# Patient Record
Sex: FEMALE | Race: WHITE | NOT HISPANIC OR LATINO | Employment: OTHER | URBAN - METROPOLITAN AREA
[De-identification: names, ages, dates, MRNs, and addresses within clinical notes are randomized per-mention and may not be internally consistent; named-entity substitution may affect disease eponyms.]

---

## 2017-01-04 ENCOUNTER — APPOINTMENT (OUTPATIENT)
Dept: PAIN MEDICINE | Facility: HOSPITAL | Age: 52
End: 2017-01-04

## 2017-03-24 ENCOUNTER — TELEPHONE (OUTPATIENT)
Dept: NEUROSURGERY | Facility: CLINIC | Age: 52
End: 2017-03-24

## 2017-03-27 ENCOUNTER — APPOINTMENT (OUTPATIENT)
Dept: PAIN MEDICINE | Facility: HOSPITAL | Age: 52
End: 2017-03-27

## 2017-03-27 DIAGNOSIS — M54.16 LUMBAR RADICULOPATHY: Primary | ICD-10-CM

## 2017-09-21 DIAGNOSIS — Z12.11 COLON CANCER SCREENING: Primary | ICD-10-CM

## 2018-07-24 ENCOUNTER — APPOINTMENT (OUTPATIENT)
Dept: WOMENS IMAGING | Facility: HOSPITAL | Age: 53
End: 2018-07-24

## 2018-07-24 PROCEDURE — 77067 SCR MAMMO BI INCL CAD: CPT | Performed by: RADIOLOGY

## 2018-12-21 ENCOUNTER — TELEPHONE (OUTPATIENT)
Dept: FAMILY MEDICINE CLINIC | Facility: CLINIC | Age: 53
End: 2018-12-21

## 2018-12-21 NOTE — TELEPHONE ENCOUNTER
----- Message from Tana Barragan sent at 12/20/2018  2:08 PM EST -----  Patient of Lissa was due to see Tana today but due to traffic she was late for her appointment.  She reschedule to see Lissa in January and is asking if she can get enough Levothyroxine until she is seen.  [facility] [phone]

## 2019-01-23 ENCOUNTER — OFFICE VISIT (OUTPATIENT)
Dept: FAMILY MEDICINE CLINIC | Facility: CLINIC | Age: 54
End: 2019-01-23

## 2019-01-23 VITALS
DIASTOLIC BLOOD PRESSURE: 70 MMHG | WEIGHT: 138 LBS | HEIGHT: 66 IN | HEART RATE: 59 BPM | OXYGEN SATURATION: 98 % | BODY MASS INDEX: 22.18 KG/M2 | SYSTOLIC BLOOD PRESSURE: 112 MMHG

## 2019-01-23 DIAGNOSIS — Z00.00 PHYSICAL EXAM: Primary | ICD-10-CM

## 2019-01-23 DIAGNOSIS — Z23 NEED FOR VACCINATION: ICD-10-CM

## 2019-01-23 DIAGNOSIS — E03.9 ACQUIRED HYPOTHYROIDISM: ICD-10-CM

## 2019-01-23 PROCEDURE — 99396 PREV VISIT EST AGE 40-64: CPT | Performed by: NURSE PRACTITIONER

## 2019-01-23 PROCEDURE — 90674 CCIIV4 VAC NO PRSV 0.5 ML IM: CPT | Performed by: NURSE PRACTITIONER

## 2019-01-23 PROCEDURE — 90471 IMMUNIZATION ADMIN: CPT | Performed by: NURSE PRACTITIONER

## 2019-01-23 NOTE — PROGRESS NOTES
"Demi Leija is a 53 y.o. female.Demi presents for an annual exam. She has not been taking Amour Thyroid for a few months.     PMHX:hypothyroid  PSHX and PSHX reviewed in chart and with pt  Is getting her colonoscopy  Exercises daily  Good sleep hygiene    Assessment/Plan   Problem List Items Addressed This Visit     None      Visit Diagnoses     Physical exam    -  Primary    Relevant Orders    Comprehensive Metabolic Panel    Lipid Panel With / Chol / HDL Ratio    Need for vaccination        Relevant Orders    Flucelvax Quad (Vial) =>4 yrs (4780-8789) (Completed)    Acquired hypothyroidism        Relevant Orders    TSH             No Follow-up on file.  There are no Patient Instructions on file for this visit.    Chief Complaint   Patient presents with   • Annual Exam     Social History     Tobacco Use   • Smoking status: Never Smoker   • Smokeless tobacco: Never Used   Substance Use Topics   • Alcohol use: Yes     Alcohol/week: 2.4 oz     Types: 4 Glasses of wine per week   • Drug use: Yes     Types: Marijuana     Comment: 4-5 times a year       History of Present Illness     The following portions of the patient's history were reviewed and updated as appropriate:PMHroutine: Social history , Past Medical History, Surgical history , Allergies, Current Medications, Active Problem List, Family History and Health Maintenance    Review of Systems   Constitutional: Negative for activity change and appetite change.   HENT: Negative for congestion.    Musculoskeletal: Positive for back pain.   Neurological: Negative for dizziness and headaches.       Objective   Vitals:    01/23/19 1331   BP: 112/70   Pulse: 59   SpO2: 98%   Weight: 62.6 kg (138 lb)   Height: 166.4 cm (65.5\")     Body mass index is 22.62 kg/m².  Physical Exam   Constitutional: She appears well-developed and well-nourished. No distress.   HENT:   Head: Normocephalic and atraumatic.   Right Ear: External ear normal.   Left Ear: External ear normal. "   Eyes: EOM are normal.   Neck: Neck supple. No thyromegaly present.   Cardiovascular: Normal rate, regular rhythm and normal heart sounds.   Pulmonary/Chest: Effort normal and breath sounds normal.   Musculoskeletal: Normal range of motion.   Neurological: She is alert.   Skin: Skin is warm.   Nursing note and vitals reviewed.    Reviewed Data:  No visits with results within 1 Month(s) from this visit.   Latest known visit with results is:   No results found for any previous visit.

## 2019-01-24 LAB
ALBUMIN SERPL-MCNC: 5.1 G/DL (ref 3.5–5.5)
ALBUMIN/GLOB SERPL: 2.6 {RATIO} (ref 1.2–2.2)
ALP SERPL-CCNC: 56 IU/L (ref 39–117)
ALT SERPL-CCNC: 19 IU/L (ref 0–32)
AST SERPL-CCNC: 18 IU/L (ref 0–40)
BILIRUB SERPL-MCNC: 1.1 MG/DL (ref 0–1.2)
BUN SERPL-MCNC: 24 MG/DL (ref 6–24)
BUN/CREAT SERPL: 25 (ref 9–23)
CALCIUM SERPL-MCNC: 9.7 MG/DL (ref 8.7–10.2)
CHLORIDE SERPL-SCNC: 101 MMOL/L (ref 96–106)
CHOLEST SERPL-MCNC: 238 MG/DL (ref 100–199)
CHOLEST/HDLC SERPL: 3.1 RATIO (ref 0–4.4)
CO2 SERPL-SCNC: 23 MMOL/L (ref 20–29)
CREAT SERPL-MCNC: 0.96 MG/DL (ref 0.57–1)
GLOBULIN SER CALC-MCNC: 2 G/DL (ref 1.5–4.5)
GLUCOSE SERPL-MCNC: 89 MG/DL (ref 65–99)
HDLC SERPL-MCNC: 76 MG/DL
LDLC SERPL CALC-MCNC: 135 MG/DL (ref 0–99)
POTASSIUM SERPL-SCNC: 4.7 MMOL/L (ref 3.5–5.2)
PROT SERPL-MCNC: 7.1 G/DL (ref 6–8.5)
SODIUM SERPL-SCNC: 141 MMOL/L (ref 134–144)
TRIGL SERPL-MCNC: 137 MG/DL (ref 0–149)
TSH SERPL DL<=0.005 MIU/L-ACNC: 1.72 UIU/ML (ref 0.45–4.5)
VLDLC SERPL CALC-MCNC: 27 MG/DL (ref 5–40)

## 2020-05-14 ENCOUNTER — OFFICE VISIT (OUTPATIENT)
Dept: FAMILY MEDICINE CLINIC | Facility: CLINIC | Age: 55
End: 2020-05-14

## 2020-05-14 ENCOUNTER — HOSPITAL ENCOUNTER (OUTPATIENT)
Dept: GENERAL RADIOLOGY | Facility: HOSPITAL | Age: 55
Discharge: HOME | End: 2020-05-14
Admitting: NURSE PRACTITIONER

## 2020-05-14 VITALS
OXYGEN SATURATION: 99 % | RESPIRATION RATE: 12 BRPM | TEMPERATURE: 97.8 F | WEIGHT: 142.8 LBS | BODY MASS INDEX: 22.95 KG/M2 | DIASTOLIC BLOOD PRESSURE: 80 MMHG | HEIGHT: 66 IN | HEART RATE: 62 BPM | SYSTOLIC BLOOD PRESSURE: 120 MMHG

## 2020-05-14 DIAGNOSIS — E03.9 HYPOTHYROIDISM, UNSPECIFIED TYPE: ICD-10-CM

## 2020-05-14 DIAGNOSIS — Z09 FOLLOW UP: ICD-10-CM

## 2020-05-14 DIAGNOSIS — M54.50 LUMBAR PAIN: Primary | ICD-10-CM

## 2020-05-14 DIAGNOSIS — M79.644 THUMB PAIN, RIGHT: ICD-10-CM

## 2020-05-14 DIAGNOSIS — M53.86 SCIATICA ASSOCIATED WITH DISORDER OF LUMBAR SPINE: ICD-10-CM
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
## 2020-05-14 PROCEDURE — 99213 OFFICE O/P EST LOW 20 MIN: CPT | Performed by: NURSE PRACTITIONER

## 2020-05-14 PROCEDURE — 72100 X-RAY EXAM L-S SPINE 2/3 VWS: CPT

## 2020-05-14 RX ORDER — BACLOFEN 10 MG/1
10 TABLET ORAL 3 TIMES DAILY
Qty: 60 TABLET | Refills: 0 | Status: SHIPPED | OUTPATIENT
Start: 2020-05-14 | End: 2020-09-09

## 2020-05-14 RX ORDER — METHYLPREDNISOLONE 4 MG/1
TABLET ORAL
Qty: 1 EACH | Refills: 0 | Status: SHIPPED | OUTPATIENT
Start: 2020-05-14 | End: 2020-09-09

## 2020-05-14 NOTE — PATIENT INSTRUCTIONS
Acute Back Pain, Adult  Acute back pain is sudden and usually short-lived. It is often caused by an injury to the muscles and tissues in the back. The injury may result from:  · A muscle or ligament getting overstretched or torn (strained). Ligaments are tissues that connect bones to each other. Lifting something improperly can cause a back strain.  · Wear and tear (degeneration) of the spinal disks. Spinal disks are circular tissue that provides cushioning between the bones of the spine (vertebrae).  · Twisting motions, such as while playing sports or doing yard work.  · A hit to the back.  · Arthritis.  You may have a physical exam, lab tests, and imaging tests to find the cause of your pain. Acute back pain usually goes away with rest and home care.  Follow these instructions at home:  Managing pain, stiffness, and swelling  · Take over-the-counter and prescription medicines only as told by your health care provider.  · Your health care provider may recommend applying ice during the first 24-48 hours after your pain starts. To do this:  ? Put ice in a plastic bag.  ? Place a towel between your skin and the bag.  ? Leave the ice on for 20 minutes, 2-3 times a day.  · If directed, apply heat to the affected area as often as told by your health care provider. Use the heat source that your health care provider recommends, such as a moist heat pack or a heating pad.  ? Place a towel between your skin and the heat source.  ? Leave the heat on for 20-30 minutes.  ? Remove the heat if your skin turns bright red. This is especially important if you are unable to feel pain, heat, or cold. You have a greater risk of getting burned.  Activity    · Do not stay in bed. Staying in bed for more than 1-2 days can delay your recovery.  · Sit up and stand up straight. Avoid leaning forward when you sit, or hunching over when you stand.  ? If you work at a desk, sit close to it so you do not need to lean over. Keep your chin tucked  "in. Keep your neck drawn back, and keep your elbows bent at a right angle. Your arms should look like the letter \"L.\"  ? Sit high and close to the steering wheel when you drive. Add lower back (lumbar) support to your car seat, if needed.  · Take short walks on even surfaces as soon as you are able. Try to increase the length of time you walk each day.  · Do not sit, drive, or  one place for more than 30 minutes at a time. Sitting or standing for long periods of time can put stress on your back.  · Do not drive or use heavy machinery while taking prescription pain medicine.  · Use proper lifting techniques. When you bend and lift, use positions that put less stress on your back:  ? Bend your knees.  ? Keep the load close to your body.  ? Avoid twisting.  · Exercise regularly as told by your health care provider. Exercising helps your back heal faster and helps prevent back injuries by keeping muscles strong and flexible.  · Work with a physical therapist to make a safe exercise program, as recommended by your health care provider. Do any exercises as told by your physical therapist.  Lifestyle  · Maintain a healthy weight. Extra weight puts stress on your back and makes it difficult to have good posture.  · Avoid activities or situations that make you feel anxious or stressed. Stress and anxiety increase muscle tension and can make back pain worse. Learn ways to manage anxiety and stress, such as through exercise.  General instructions  · Sleep on a firm mattress in a comfortable position. Try lying on your side with your knees slightly bent. If you lie on your back, put a pillow under your knees.  · Follow your treatment plan as told by your health care provider. This may include:  ? Cognitive or behavioral therapy.  ? Acupuncture or massage therapy.  ? Meditation or yoga.  Contact a health care provider if:  · You have pain that is not relieved with rest or medicine.  · You have increasing pain going down " into your legs or buttocks.  · Your pain does not improve after 2 weeks.  · You have pain at night.  · You lose weight without trying.  · You have a fever or chills.  Get help right away if:  · You develop new bowel or bladder control problems.  · You have unusual weakness or numbness in your arms or legs.  · You develop nausea or vomiting.  · You develop abdominal pain.  · You feel faint.  Summary  · Acute back pain is sudden and usually short-lived.  · Use proper lifting techniques. When you bend and lift, use positions that put less stress on your back.  · Take over-the-counter and prescription medicines and apply heat or ice as directed by your health care provider.  This information is not intended to replace advice given to you by your health care provider. Make sure you discuss any questions you have with your health care provider.  Document Released: 12/18/2006 Document Revised: 07/25/2019 Document Reviewed: 08/01/2018  [employer] Interactive Patient Education © 2020 [employer]
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
## 2020-05-15 LAB — TSH SERPL DL<=0.005 MIU/L-ACNC: 2.83 UIU/ML (ref 0.45–4.5)

## 2020-05-28 RX ORDER — LEVOTHYROXINE AND LIOTHYRONINE 38; 9 UG/1; UG/1
60 TABLET ORAL DAILY
Qty: 60 TABLET | Refills: 0 | Status: SHIPPED | OUTPATIENT
Start: 2020-05-28 | End: 2020-09-09

## 2020-06-09 ENCOUNTER — OFFICE VISIT (OUTPATIENT)
Dept: FAMILY MEDICINE CLINIC | Facility: CLINIC | Age: 55
End: 2020-06-09

## 2020-06-09 VITALS
RESPIRATION RATE: 16 BRPM | WEIGHT: 141 LBS | SYSTOLIC BLOOD PRESSURE: 110 MMHG | BODY MASS INDEX: 22.66 KG/M2 | HEART RATE: 62 BPM | OXYGEN SATURATION: 99 % | DIASTOLIC BLOOD PRESSURE: 82 MMHG | TEMPERATURE: 98.6 F | HEIGHT: 66 IN

## 2020-06-09 DIAGNOSIS — G89.29 HIP PAIN, CHRONIC, LEFT: ICD-10-CM

## 2020-06-09 DIAGNOSIS — F43.10 POSTTRAUMATIC STRESS DISORDER: ICD-10-CM

## 2020-06-09 DIAGNOSIS — M25.552 HIP PAIN, CHRONIC, LEFT: ICD-10-CM

## 2020-06-09 DIAGNOSIS — F41.9 ANXIETY: Primary | ICD-10-CM

## 2020-06-09 PROCEDURE — 99213 OFFICE O/P EST LOW 20 MIN: CPT | Performed by: NURSE PRACTITIONER

## 2020-06-09 RX ORDER — CELECOXIB 100 MG/1
100 CAPSULE ORAL 2 TIMES DAILY PRN
COMMUNITY
End: 2020-09-09

## 2020-06-09 RX ORDER — BUSPIRONE HYDROCHLORIDE 5 MG/1
5 TABLET ORAL 3 TIMES DAILY
Qty: 90 TABLET | Refills: 1 | Status: SHIPPED | OUTPATIENT
Start: 2020-06-09 | End: 2020-09-09
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
## 2020-06-09 NOTE — PATIENT INSTRUCTIONS
Generalized Anxiety Disorder, Adult  Generalized anxiety disorder (JONATHAN) is a mental health disorder. People with this condition constantly worry about everyday events. Unlike normal anxiety, worry related to JONATHAN is not triggered by a specific event. These worries also do not fade or get better with time. JONATHAN interferes with life functions, including relationships, work, and school.  JONATHAN can vary from mild to severe. People with severe JONATHAN can have intense waves of anxiety with physical symptoms (panic attacks).  What are the causes?  The exact cause of JONATHAN is not known.  What increases the risk?  This condition is more likely to develop in:  · Women.  · People who have a family history of anxiety disorders.  · People who are very shy.  · People who experience very stressful life events, such as the death of a loved one.  · People who have a very stressful family environment.  What are the signs or symptoms?  People with JONATHAN often worry excessively about many things in their lives, such as their health and family. They may also be overly concerned about:  · Doing well at work.  · Being on time.  · Natural disasters.  · Friendships.  Physical symptoms of JONATHAN include:  · Fatigue.  · Muscle tension or having muscle twitches.  · Trembling or feeling shaky.  · Being easily startled.  · Feeling like your heart is pounding or racing.  · Feeling out of breath or like you cannot take a deep breath.  · Having trouble falling asleep or staying asleep.  · Sweating.  · Nausea, diarrhea, or irritable bowel syndrome (IBS).  · Headaches.  · Trouble concentrating or remembering facts.  · Restlessness.  · Irritability.  How is this diagnosed?  Your health care provider can diagnose JONATHAN based on your symptoms and medical history. You will also have a physical exam. The health care provider will ask specific questions about your symptoms, including how severe they are, when they started, and if they come and go. Your health care  provider may ask you about your use of alcohol or drugs, including prescription medicines. Your health care provider may refer you to a mental health specialist for further evaluation.  Your health care provider will do a thorough examination and may perform additional tests to rule out other possible causes of your symptoms.  To be diagnosed with JONATHAN, a person must have anxiety that:  · Is out of his or her control.  · Affects several different aspects of his or her life, such as work and relationships.  · Causes distress that makes him or her unable to take part in normal activities.  · Includes at least three physical symptoms of JONATHAN, such as restlessness, fatigue, trouble concentrating, irritability, muscle tension, or sleep problems.  Before your health care provider can confirm a diagnosis of JONATHAN, these symptoms must be present more days than they are not, and they must last for six months or longer.  How is this treated?  The following therapies are usually used to treat JONATHAN:  · Medicine. Antidepressant medicine is usually prescribed for long-term daily control. Antianxiety medicines may be added in severe cases, especially when panic attacks occur.  · Talk therapy (psychotherapy). Certain types of talk therapy can be helpful in treating JONATHAN by providing support, education, and guidance. Options include:  ? Cognitive behavioral therapy (CBT). People learn coping skills and techniques to ease their anxiety. They learn to identify unrealistic or negative thoughts and behaviors and to replace them with positive ones.  ? Acceptance and commitment therapy (ACT). This treatment teaches people how to be mindful as a way to cope with unwanted thoughts and feelings.  ? Biofeedback. This process trains you to manage your body's response (physiological response) through breathing techniques and relaxation methods. You will work with a therapist while machines are used to monitor your physical symptoms.  · Stress  management techniques. These include yoga, meditation, and exercise.  A mental health specialist can help determine which treatment is best for you. Some people see improvement with one type of therapy. However, other people require a combination of therapies.  Follow these instructions at home:  · Take over-the-counter and prescription medicines only as told by your health care provider.  · Try to maintain a normal routine.  · Try to anticipate stressful situations and allow extra time to manage them.  · Practice any stress management or self-calming techniques as taught by your health care provider.  · Do not punish yourself for setbacks or for not making progress.  · Try to recognize your accomplishments, even if they are small.  · Keep all follow-up visits as told by your health care provider. This is important.  Contact a health care provider if:  · Your symptoms do not get better.  · Your symptoms get worse.  · You have signs of depression, such as:  ? A persistently sad, cranky, or irritable mood.  ? Loss of enjoyment in activities that used to bring you donna.  ? Change in weight or eating.  ? Changes in sleeping habits.  ? Avoiding friends or family members.  ? Loss of energy for normal tasks.  ? Feelings of guilt or worthlessness.  Get help right away if:  · You have serious thoughts about hurting yourself or others.  If you ever feel like you may hurt yourself or others, or have thoughts about taking your own life, get help right away. You can go to your nearest emergency department or call:  · Your local emergency services (911 in the [place]  · A suicide crisis helpline, such as the National Suicide Prevention Lifeline at 1-176.370.4202. This is open 24 hours a day.  Summary  · Generalized anxiety disorder (JONATHAN) is a mental health disorder that involves worry that is not triggered by a specific event.  · People with JONATHAN often worry excessively about many things in their lives, such as their health and  family.  · JONATHAN may cause physical symptoms such as restlessness, trouble concentrating, sleep problems, frequent sweating, nausea, diarrhea, headaches, and trembling or muscle twitching.  · A mental health specialist can help determine which treatment is best for you. Some people see improvement with one type of therapy. However, other people require a combination of therapies.  This information is not intended to replace advice given to you by your health care provider. Make sure you discuss any questions you have with your health care provider.  Document Released: 04/14/2014 Document Revised: 11/30/2018 Document Reviewed: 11/07/2017  [employer] Patient Education © 2020 [employer]

## 2020-06-17 DIAGNOSIS — F43.10 PTSD (POST-TRAUMATIC STRESS DISORDER): Primary | ICD-10-CM

## 2020-06-19 ENCOUNTER — CONSULT (OUTPATIENT)
Dept: ORTHOPEDIC SURGERY | Facility: CLINIC | Age: 55
End: 2020-06-19

## 2020-06-19 VITALS — HEIGHT: 65 IN | WEIGHT: 141 LBS | BODY MASS INDEX: 23.49 KG/M2 | TEMPERATURE: 98 F

## 2020-06-19 DIAGNOSIS — M70.62 TROCHANTERIC BURSITIS OF LEFT HIP: ICD-10-CM

## 2020-06-19 DIAGNOSIS — M25.552 BILATERAL HIP PAIN: Primary | ICD-10-CM

## 2020-06-19 DIAGNOSIS — M70.61 TROCHANTERIC BURSITIS OF RIGHT HIP: ICD-10-CM

## 2020-06-19 DIAGNOSIS — M25.551 BILATERAL HIP PAIN: Primary | ICD-10-CM

## 2020-06-19 DIAGNOSIS — M51.36 DDD (DEGENERATIVE DISC DISEASE), LUMBAR: ICD-10-CM

## 2020-06-19 PROCEDURE — 73522 X-RAY EXAM HIPS BI 3-4 VIEWS: CPT | Performed by: ORTHOPAEDIC SURGERY

## 2020-06-19 PROCEDURE — 99243 OFF/OP CNSLTJ NEW/EST LOW 30: CPT | Performed by: ORTHOPAEDIC SURGERY
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
## 2020-06-22 RX ORDER — CYCLOBENZAPRINE HCL 10 MG
10 TABLET ORAL 3 TIMES DAILY PRN
Qty: 60 TABLET | Refills: 0 | Status: SHIPPED | OUTPATIENT
Start: 2020-06-22 | End: 2020-09-09

## 2020-08-10 DIAGNOSIS — Z01.89 ROUTINE LAB DRAW: Primary | ICD-10-CM

## 2020-08-11 DIAGNOSIS — E03.9 HYPOTHYROIDISM, UNSPECIFIED TYPE: ICD-10-CM

## 2020-08-11 DIAGNOSIS — Z01.89 ROUTINE LAB DRAW: Primary | ICD-10-CM

## 2020-08-12 LAB
ALBUMIN SERPL-MCNC: 4.9 G/DL (ref 3.8–4.9)
ALBUMIN/GLOB SERPL: 2.1 {RATIO} (ref 1.2–2.2)
ALP SERPL-CCNC: 50 IU/L (ref 39–117)
ALT SERPL-CCNC: 18 IU/L (ref 0–32)
AST SERPL-CCNC: 17 IU/L (ref 0–40)
BILIRUB SERPL-MCNC: 1.2 MG/DL (ref 0–1.2)
BUN SERPL-MCNC: 20 MG/DL (ref 6–24)
BUN/CREAT SERPL: 19 (ref 9–23)
CALCIUM SERPL-MCNC: 10.1 MG/DL (ref 8.7–10.2)
CHLORIDE SERPL-SCNC: 101 MMOL/L (ref 96–106)
CHOLEST SERPL-MCNC: 248 MG/DL (ref 100–199)
CO2 SERPL-SCNC: 27 MMOL/L (ref 20–29)
CREAT SERPL-MCNC: 1.06 MG/DL (ref 0.57–1)
GLOBULIN SER CALC-MCNC: 2.3 G/DL (ref 1.5–4.5)
GLUCOSE SERPL-MCNC: 92 MG/DL (ref 65–99)
HDLC SERPL-MCNC: 55 MG/DL
LDLC SERPL CALC-MCNC: 148 MG/DL (ref 0–99)
LDLC/HDLC SERPL: 2.7 RATIO (ref 0–3.2)
POTASSIUM SERPL-SCNC: 4.8 MMOL/L (ref 3.5–5.2)
PROT SERPL-MCNC: 7.2 G/DL (ref 6–8.5)
SODIUM SERPL-SCNC: 141 MMOL/L (ref 134–144)
TRIGL SERPL-MCNC: 224 MG/DL (ref 0–149)
TSH SERPL DL<=0.005 MIU/L-ACNC: 1.93 UIU/ML (ref 0.45–4.5)
VLDLC SERPL CALC-MCNC: 45 MG/DL (ref 5–40)

## 2020-08-13 DIAGNOSIS — R53.83 FATIGUE, UNSPECIFIED TYPE: Primary | ICD-10-CM

## 2020-08-18 ENCOUNTER — RESULTS ENCOUNTER (OUTPATIENT)
Dept: FAMILY MEDICINE CLINIC | Facility: CLINIC | Age: 55
End: 2020-08-18

## 2020-08-18 DIAGNOSIS — R53.83 FATIGUE, UNSPECIFIED TYPE: ICD-10-CM

## 2020-08-31 ENCOUNTER — HOSPITAL ENCOUNTER (OUTPATIENT)
Dept: OTHER | Facility: HOSPITAL | Age: 55
Discharge: HOME | End: 2020-08-31
Attending: FAMILY MEDICINE

## 2020-09-01 ENCOUNTER — OFFICE VISIT (OUTPATIENT)
Dept: FAMILY MEDICINE CLINIC | Facility: CLINIC | Age: 55
End: 2020-09-01

## 2020-09-01 DIAGNOSIS — R19.7 DIARRHEA, UNSPECIFIED TYPE: ICD-10-CM

## 2020-09-01 DIAGNOSIS — G89.29 CHRONIC BILATERAL LOW BACK PAIN WITH RIGHT-SIDED SCIATICA: ICD-10-CM

## 2020-09-01 DIAGNOSIS — G47.00 INSOMNIA, UNSPECIFIED TYPE: ICD-10-CM

## 2020-09-01 DIAGNOSIS — M54.41 CHRONIC BILATERAL LOW BACK PAIN WITH RIGHT-SIDED SCIATICA: ICD-10-CM

## 2020-09-01 DIAGNOSIS — Z12.11 SCREENING FOR COLON CANCER: Primary | ICD-10-CM

## 2020-09-01 DIAGNOSIS — F41.9 ANXIETY: ICD-10-CM

## 2020-09-01 PROCEDURE — 99214 OFFICE O/P EST MOD 30 MIN: CPT | Performed by: NURSE PRACTITIONER

## 2020-09-01 NOTE — PROGRESS NOTES
Subjective wants to discuss blood pressure issues  Demi Leija is a 54 y.o. female.     History of Present Illness   Tele-visit  Has a history of back pain for several months. She has been seeing a chiropractor which seems to be helping a little.  She also started with diarrhea yesterday and decided to be tested for Covid and placed herself on the BRAT diet.  She has not had her colonoscopy yet and is adking for another order for a colonoscopy.  Is also having some insomnia. She has been taking some otc melatonin and drinking chamomile tea with little improvement.  She also has some anxiety and is thinking about starting on some medicines.  Lastly she would like discuss her last lab results.  The following portions of the patient's history were reviewed and updated as appropriate: allergies, current medications, past family history, past medical history, past social history, past surgical history and problem list.    Review of Systems   Constitutional: Positive for activity change and appetite change. Negative for chills, fatigue and fever.   HENT: Negative for congestion.    Eyes: Negative for pain.   Respiratory: Negative for cough and shortness of breath.    Cardiovascular: Negative for chest pain and leg swelling.   Gastrointestinal: Positive for diarrhea. Negative for constipation, nausea and vomiting.   Genitourinary: Negative for difficulty urinating.   Skin: Negative for rash.   Neurological: Negative for dizziness, facial asymmetry, headache and memory problem.   Psychiatric/Behavioral: Positive for stress. Negative for self-injury and suicidal ideas.       Objective   Physical Exam   Constitutional: She is oriented to person, place, and time.   Neurological: She is alert and oriented to person, place, and time.   Skin: Skin is warm.   Psychiatric: She has a normal mood and affect.         Assessment/Plan   Diagnoses and all orders for this visit:    Screening for colon cancer  -     Ambulatory Referral  to Gastroenterology    Anxiety    Diarrhea, unspecified type    Insomnia, unspecified type    Chronic bilateral low back pain with right-sided sciatica    I ordered a referral to gastroenterology for her screening colonoscopy.  She is going to continue to try her chamomile tea and herbal medicine for anxiety if it does not work she will call us back.  Advised her to start with some Imodium and continue her brat diet as far as her diarrhea was concerned.  She advised me she was going to ask her chiropractor for a name of a good neurosurgeon.  I had a lengthy discussion concerning her latest labs.  It seems as though her cholesterol panel is a bit out of the normal range.  We discussed decreasing her alcohol intake and starting an exercise regimen.  You have chosen to receive care through a telephone visit. Do you consent to use a telephone visit for your medical care today? Yes  I spent 25 minutes on the telephone with the patient discussing her current complaints, symptomology and our plan of care.

## 2020-09-04 LAB — SARS-COV-2 RNA SPEC QL NAA+PROBE: NOT DETECTED

## 2020-09-09 ENCOUNTER — OFFICE VISIT (OUTPATIENT)
Dept: FAMILY MEDICINE CLINIC | Facility: CLINIC | Age: 55
End: 2020-09-09

## 2020-09-09 VITALS
OXYGEN SATURATION: 98 % | WEIGHT: 138 LBS | RESPIRATION RATE: 14 BRPM | BODY MASS INDEX: 22.99 KG/M2 | HEIGHT: 65 IN | DIASTOLIC BLOOD PRESSURE: 88 MMHG | SYSTOLIC BLOOD PRESSURE: 138 MMHG | HEART RATE: 64 BPM

## 2020-09-09 DIAGNOSIS — R10.11 RIGHT UPPER QUADRANT ABDOMINAL PAIN: ICD-10-CM

## 2020-09-09 DIAGNOSIS — K80.50 GALL BLADDER PAIN: Primary | ICD-10-CM

## 2020-09-09 PROCEDURE — 99213 OFFICE O/P EST LOW 20 MIN: CPT | Performed by: NURSE PRACTITIONER

## 2020-09-09 RX ORDER — DICYCLOMINE HYDROCHLORIDE 10 MG/1
10 CAPSULE ORAL
Qty: 18 CAPSULE | Refills: 0 | Status: SHIPPED | OUTPATIENT
Start: 2020-09-09

## 2020-09-09 NOTE — PROGRESS NOTES
Subjective   Demi Leija is a 54 y.o. female.     History of Present Illness   Uncontrollable diarrhea with hematochezia. Immense pain and bloating. Has stopped eating extra for small amounts. Has pain to her upper right quadrant and thinks she has gallbladder issues.  The following portions of the patient's history were reviewed and updated as appropriate: allergies, current medications, past family history, past medical history, past social history, past surgical history and problem list.    Review of Systems   Constitutional: Negative for activity change, appetite change and fever.   Respiratory: Negative for cough and shortness of breath.    Cardiovascular: Negative for chest pain and leg swelling.   Gastrointestinal: Positive for diarrhea.   Skin: Negative for rash.       Objective   Physical Exam   Constitutional: She appears well-developed and well-nourished. She appears distressed.   HENT:   Head: Normocephalic and atraumatic.   Eyes: Pupils are equal, round, and reactive to light. Conjunctivae and EOM are normal.   Cardiovascular: Normal rate.   Pulmonary/Chest: Effort normal and breath sounds normal.   Abdominal: Soft. Bowel sounds are normal. She exhibits distension. There is tenderness. There is guarding.   Musculoskeletal: Normal range of motion.   Neurological: She is alert.   Skin: Skin is warm.   Nursing note and vitals reviewed.        Assessment/Plan   Demi was seen today for diarrhea.    Diagnoses and all orders for this visit:    Gall bladder pain  -     Gamma GT  -     Lipase  -     Amylase  -     Cancel: CT Abdomen Pelvis With & Without Contrast  -     US Gallbladder; Future    Right upper quadrant abdominal pain  -     US Gallbladder; Future    Other orders  -     dicyclomine (Bentyl) 10 MG capsule; Take 1 capsule by mouth 3 (Three) Times a Day Before Meals.  -     Vitamin B12      Will call her with her lab levels.  Ordered an ultrasound of her gallbladder today.  Discussed taking the  Bentyl to help with the motility and cramping.

## 2020-09-10 LAB
AMYLASE SERPL-CCNC: 42 U/L (ref 28–100)
GGT SERPL-CCNC: 15 U/L (ref 5–36)
LIPASE SERPL-CCNC: 29 U/L (ref 13–60)
VIT B12 SERPL-MCNC: 425 PG/ML (ref 211–946)

## 2020-09-10 NOTE — PATIENT INSTRUCTIONS
Diarrhea, Adult  Diarrhea is frequent loose and watery bowel movements. Diarrhea can make you feel weak and cause you to become dehydrated. Dehydration can make you tired and thirsty, cause you to have a dry mouth, and decrease how often you urinate.  Diarrhea typically lasts 2-3 days. However, it can last longer if it is a sign of something more serious. It is important to treat your diarrhea as told by your health care provider.  Follow these instructions at home:  Eating and drinking         Follow these recommendations as told by your health care provider:  · Take an oral rehydration solution (ORS). This is an over-the-counter medicine that helps return your body to its normal balance of nutrients and water. It is found at pharmacies and retail stores.  · Drink plenty of fluids, such as water, ice chips, diluted fruit juice, and low-calorie sports drinks. You can drink milk also, if desired.  · Avoid drinking fluids that contain a lot of sugar or caffeine, such as energy drinks, sports drinks, and soda.  · Eat bland, easy-to-digest foods in small amounts as you are able. These foods include bananas, applesauce, rice, lean meats, toast, and crackers.  · Avoid alcohol.  · Avoid spicy or fatty foods.    Medicines  · Take over-the-counter and prescription medicines only as told by your health care provider.  · If you were prescribed an antibiotic medicine, take it as told by your health care provider. Do not stop using the antibiotic even if you start to feel better.  General instructions    · Wash your hands often using soap and water. If soap and water are not available, use a hand . Others in the household should wash their hands as well. Hands should be washed:  ? After using the toilet or changing a diaper.  ? Before preparing, cooking, or serving food.  ? While caring for a sick person or while visiting someone in a hospital.  · Drink enough fluid to keep your urine pale yellow.  · Rest at home while  you recover.  · Watch your condition for any changes.  · Take a warm bath to relieve any burning or pain from frequent diarrhea episodes.  · Keep all follow-up visits as told by your health care provider. This is important.  Contact a health care provider if:  · You have a fever.  · Your diarrhea gets worse.  · You have new symptoms.  · You cannot keep fluids down.  · You feel light-headed or dizzy.  · You have a headache.  · You have muscle cramps.  Get help right away if:  · You have chest pain.  · You feel extremely weak or you faint.  · You have bloody or black stools or stools that look like tar.  · You have severe pain, cramping, or bloating in your abdomen.  · You have trouble breathing or you are breathing very quickly.  · Your heart is beating very quickly.  · Your skin feels cold and clammy.  · You feel confused.  · You have signs of dehydration, such as:  ? Dark urine, very little urine, or no urine.  ? Cracked lips.  ? Dry mouth.  ? Sunken eyes.  ? Sleepiness.  ? Weakness.  Summary  · Diarrhea is frequent loose and watery bowel movements. Diarrhea can make you feel weak and cause you to become dehydrated.  · Drink enough fluids to keep your urine pale yellow.  · Make sure that you wash your hands after using the toilet. If soap and water are not available, use hand .  · Contact a health care provider if your diarrhea gets worse or you have new symptoms.  · Get help right away if you have signs of dehydration.  This information is not intended to replace advice given to you by your health care provider. Make sure you discuss any questions you have with your health care provider.  Document Released: 12/08/2003 Document Revised: 05/05/2020 Document Reviewed: 05/24/2019  [employer] Patient Education © 2020 [employer]

## 2020-09-15 DIAGNOSIS — R19.7 DIARRHEA, UNSPECIFIED TYPE: Primary | ICD-10-CM

## 2020-09-15 DIAGNOSIS — K52.9 CHRONIC DIARRHEA: Primary | ICD-10-CM

## 2020-09-15 DIAGNOSIS — K80.50 GALL BLADDER PAIN: ICD-10-CM

## 2020-09-15 DIAGNOSIS — R10.11 RIGHT UPPER QUADRANT ABDOMINAL PAIN: ICD-10-CM

## 2020-09-16 ENCOUNTER — TELEPHONE (OUTPATIENT)
Dept: FAMILY MEDICINE CLINIC | Facility: CLINIC | Age: 55
End: 2020-09-16

## 2020-09-16 DIAGNOSIS — R19.7 DIARRHEA, UNSPECIFIED TYPE: Primary | ICD-10-CM

## 2020-09-16 NOTE — TELEPHONE ENCOUNTER
Florencia with [facility], for [facility]  is calling to request an alternate recommendation for imaging.    Please advise        [facility] call back [phone]    Reference #[id number]

## 2020-09-17 LAB — SPECIMEN STATUS: NORMAL

## 2020-09-18 LAB
O+P SPEC MICRO: NORMAL
O+P STL CONC: NORMAL
SPECIMEN STATUS: NORMAL

## 2020-09-19 LAB
BACTERIA SPEC CULT: NORMAL
BACTERIA SPEC CULT: NORMAL
CAMPYLOBACTER STL CULT: NORMAL
E COLI SXT STL QL IA: NEGATIVE
SALM + SHIG STL CULT: NORMAL

## 2020-10-16 ENCOUNTER — APPOINTMENT (OUTPATIENT)
Dept: WOMENS IMAGING | Facility: HOSPITAL | Age: 55
End: 2020-10-16

## 2020-10-16 ENCOUNTER — OFFICE VISIT CONVERTED (OUTPATIENT)
Dept: FAMILY MEDICINE CLINIC | Age: 55
End: 2020-10-16
Attending: NURSE PRACTITIONER

## 2020-10-16 PROCEDURE — 77067 SCR MAMMO BI INCL CAD: CPT | Performed by: RADIOLOGY

## 2020-10-16 PROCEDURE — 77063 BREAST TOMOSYNTHESIS BI: CPT | Performed by: RADIOLOGY

## 2020-10-23 ENCOUNTER — HOSPITAL ENCOUNTER (OUTPATIENT)
Dept: OTHER | Facility: HOSPITAL | Age: 55
Discharge: HOME | End: 2020-10-23
Attending: NURSE PRACTITIONER

## 2020-10-24 LAB
ALP SERPL-CCNC: 52 U/L (ref 53–141)
ASO AB SERPL-ACNC: 85 [IU]/ML (ref 0–200)
CALCIUM SERPL-MCNC: 9.3 MG/DL (ref 8.7–10.4)
CONV RHEUMATOID FACTOR IGM: <10 [IU]/ML (ref 0–14)
CRP SERPL-MCNC: 5.2 MG/L (ref 0–5)
DSDNA AB SER-ACNC: NEGATIVE [IU]/ML
ENA AB SER IA-ACNC: NEGATIVE {RATIO}
ERYTHROCYTE [SEDIMENTATION RATE] IN BLOOD: 2 MM/H (ref 0–30)
PHOSPHATE SERPL-MCNC: 3.4 MG/DL (ref 2.4–4.5)
URATE SERPL-MCNC: 6.1 MG/DL (ref 2.5–7.5)

## 2020-10-28 ENCOUNTER — OFFICE VISIT (OUTPATIENT)
Dept: GASTROENTEROLOGY | Facility: CLINIC | Age: 55
End: 2020-10-28

## 2020-10-28 VITALS — BODY MASS INDEX: 22.79 KG/M2 | WEIGHT: 141.8 LBS | TEMPERATURE: 97 F | HEIGHT: 66 IN

## 2020-10-28 DIAGNOSIS — Z83.71 FAMILY HISTORY OF POLYPS IN THE COLON: ICD-10-CM

## 2020-10-28 DIAGNOSIS — R10.13 DYSPEPSIA: ICD-10-CM

## 2020-10-28 DIAGNOSIS — R19.4 CHANGE IN BOWEL HABITS: Primary | ICD-10-CM

## 2020-10-28 DIAGNOSIS — R11.0 NAUSEA: ICD-10-CM

## 2020-10-28 DIAGNOSIS — Z80.0 FAMILY HISTORY OF COLON CANCER IN FATHER: ICD-10-CM

## 2020-10-28 DIAGNOSIS — R19.7 DIARRHEA, UNSPECIFIED TYPE: ICD-10-CM

## 2020-10-28 PROCEDURE — 99204 OFFICE O/P NEW MOD 45 MIN: CPT | Performed by: NURSE PRACTITIONER

## 2020-10-28 NOTE — PROGRESS NOTES
Chief Complaint   Patient presents with   • Diarrhea   • Nausea   • Abdominal Cramping     HPI    Demi Leija is a  54 y.o. female here to establish care as a new patient for complaints of diarrhea.  This patient will follow with Dr. William as well.  Patient had stool culture and O&P in September which was negative.  No C. difficile was performed.  Recent CMP was normal.  Patient also had a GGT, lipase, amylase, TSH which were all normal.    Patient reports having an episode of diarrhea about 6 months ago initially thought to be due to starting meloxicam.  She was on meloxicam for approximately 1 year and stopped when diarrhea began.  Diarrhea subsided but then promptly returned in August of this year.  Before onset of diarrhea symptoms her bowels move daily to every other day formed with complete evacuation.  She is currently having 6-12 liquid stools a day, excessive gas and bloating, rectal bleeding, and occasional incontinence.  No weight loss in fact she is gained weight.  She tried Bentyl which did help with her diarrhea symptoms.    Some intermittent nausea but no vomiting.  She struggles with dyspepsia which historically responds to OTC Tums but has been worse with onset of diarrhea.  Her appetite waxes and wanes based on the severity of diarrhea symptoms.  She frequently avoids eating to avoid having diarrhea.  No dysphagia.  She is never had an upper endoscopy.    Recent abdominal ultrasound with multiple small gallstones.  No gallbladder wall thickening or pericholecystic fluid.  Common bile duct mildly distended 8 mm but no visible gall duct stone.    Recent CT abdomen and pelvis with contrast reviewed dated 9/24/2020 unremarkable.    Family history of colon cancer, father age 70.  Family history of colon polyps, brother.  Family history of celiac disease, mother.  She does not smoke.  She drinks alcohol socially.  She still takes occasional ibuprofen for back pain.  She still has her  gallbladder.    Past Medical History:   Diagnosis Date   • Hypothyroidism    • Post-menopause        Past Surgical History:   Procedure Laterality Date   • BUNIONECTOMY Bilateral    • RHINOPLASTY     • STOMACH SURGERY     • TONSILLECTOMY     • WISDOM TOOTH EXTRACTION         Scheduled Meds:  Outpatient Encounter Medications as of 10/28/2020   Medication Sig Dispense Refill   • NON FORMULARY Compound testosterone/estogen cream     • progesterone (PROMETRIUM) 200 MG capsule Take 200 mg by mouth Daily.     • dicyclomine (Bentyl) 10 MG capsule Take 1 capsule by mouth 3 (Three) Times a Day Before Meals. 18 capsule 0     No facility-administered encounter medications on file as of 10/28/2020.        Continuous Infusions:No current facility-administered medications for this visit.       PRN Meds:.    No Known Allergies    Social History     Socioeconomic History   • Marital status:      Spouse name: Not on file   • Number of children: 2   • Years of education: HIGH SCHOOL   • Highest education level: Not on file   Occupational History   • Occupation: REALTOR   Tobacco Use   • Smoking status: Never Smoker   • Smokeless tobacco: Never Used   Substance and Sexual Activity   • Alcohol use: Yes     Alcohol/week: 4.0 standard drinks     Types: 4 Glasses of wine per week   • Drug use: Yes     Types: Marijuana     Comment: 4-5 times a year   • Sexual activity: Defer       Family History   Problem Relation Age of Onset   • Arthritis Mother    • Stroke Mother    • Arthritis Father    • Cancer Father         colon   • Stroke Father    • Colon cancer Father    • Hypertension Sister    • Cancer Brother         prostate   • Stroke Brother         x 2   • Hypertension Brother    • Colon polyps Brother        Review of Systems   Constitutional: Negative for activity change, appetite change, fatigue, fever and unexpected weight change.   HENT: Negative for trouble swallowing and voice change.    Eyes: Negative.    Respiratory:  Negative for apnea, cough, choking, chest tightness, shortness of breath and wheezing.    Cardiovascular: Negative for chest pain, palpitations and leg swelling.   Gastrointestinal: Positive for diarrhea and nausea. Negative for abdominal distention, abdominal pain, anal bleeding, blood in stool, constipation, rectal pain and vomiting.        + Abdominal cramps   Endocrine: Negative.    Genitourinary: Negative.    Musculoskeletal: Negative.    Skin: Negative.    Allergic/Immunologic: Negative.    Neurological: Negative.    Hematological: Negative.    Psychiatric/Behavioral: Negative.        Vitals:    10/28/20 1356   Temp: 97 °F (36.1 °C)       Physical Exam  Constitutional:       Appearance: She is well-developed.   HENT:      Head: Normocephalic.      Nose: Nose normal.   Eyes:      Pupils: Pupils are equal, round, and reactive to light.   Neck:      Musculoskeletal: Normal range of motion.   Cardiovascular:      Rate and Rhythm: Normal rate and regular rhythm.      Heart sounds: Normal heart sounds.   Pulmonary:      Effort: Pulmonary effort is normal. No respiratory distress.      Breath sounds: Normal breath sounds. No wheezing.   Abdominal:      General: Bowel sounds are normal. There is no distension.      Palpations: Abdomen is soft. There is no mass.      Tenderness: There is no abdominal tenderness. There is no guarding.      Hernia: No hernia is present.   Musculoskeletal: Normal range of motion.   Skin:     General: Skin is warm and dry.      Capillary Refill: Capillary refill takes less than 2 seconds.   Neurological:      Mental Status: She is alert and oriented to person, place, and time.   Psychiatric:         Behavior: Behavior normal.     Problem list    Change in bowel habits  Diarrhea  Rectal bleeding  Abdominal cramping  Nausea  Dyspepsia  Gallstones  Family history of colon cancer and polyps  Family history of celiac disease    Assessment/plan    Pleasant 54-year-old female seen today to  establish care as new patient for complaints and change in bowel habits, diarrhea, rectal bleeding, nausea, and dyspepsia ongoing since August waxing and waning in severity.  She also has a family history of celiac disease, colon cancer, and colon polyps.  Given her current symptoms and family history recommend bidirectional endoscopic evaluation with Dr. William.    Labs today with celiac panel, CRP, and sed rate.  Complete more broad panel stool testing to include GI PCR, C. difficile, and fecal calprotectin.  Continue as needed antispasmodics.  If endoscopic work-up found to be unremarkable consider HIDA scan.    Further recommendations and follow-up pending the aforementioned work-up.

## 2020-10-29 DIAGNOSIS — R19.7 DIARRHEA, UNSPECIFIED TYPE: ICD-10-CM

## 2020-10-29 LAB
CRP SERPL-MCNC: 1.34 MG/DL (ref 0–0.5)
ENDOMYSIUM IGA SER QL: NEGATIVE
ERYTHROCYTE [SEDIMENTATION RATE] IN BLOOD BY WESTERGREN METHOD: 8 MM/HR (ref 0–30)
GLIADIN PEPTIDE IGA SER-ACNC: 2 UNITS (ref 0–19)
GLIADIN PEPTIDE IGG SER-ACNC: 2 UNITS (ref 0–19)
IGA SERPL-MCNC: 40 MG/DL (ref 87–352)
TTG IGA SER-ACNC: <2 U/ML (ref 0–3)
TTG IGG SER-ACNC: 2 U/ML (ref 0–5)

## 2020-10-30 LAB — C DIFF TOX GENS STL QL NAA+PROBE: NEGATIVE

## 2020-11-01 LAB
ADV 40+41 DNA STL QL NAA+NON-PROBE: NOT DETECTED
ASTRO TYP 1-8 RNA STL QL NAA+NON-PROBE: NOT DETECTED
C CAYETANENSIS DNA STL QL NAA+NON-PROBE: NOT DETECTED
C COLI+JEJ+UPSA DNA STL QL NAA+NON-PROBE: NOT DETECTED
C DIF TOX TCDA+TCDB STL QL NAA+NON-PROBE: NOT DETECTED
CRYPTOSP DNA STL QL NAA+NON-PROBE: NOT DETECTED
E COLI O157 DNA STL QL NAA+NON-PROBE: NORMAL
E HISTOLYT DNA STL QL NAA+NON-PROBE: NOT DETECTED
EAEC PAA PLAS AGGR+AATA ST NAA+NON-PRB: NOT DETECTED
EC STX1+STX2 GENES STL QL NAA+NON-PROBE: NOT DETECTED
EPEC EAE GENE STL QL NAA+NON-PROBE: NOT DETECTED
ETEC LTA+ST1A+ST1B TOX ST NAA+NON-PROBE: NOT DETECTED
G LAMBLIA DNA STL QL NAA+NON-PROBE: NOT DETECTED
NOROVIRUS GI+II RNA STL QL NAA+NON-PROBE: NOT DETECTED
P SHIGELLOIDES DNA STL QL NAA+NON-PROBE: NOT DETECTED
RVA RNA STL QL NAA+NON-PROBE: NOT DETECTED
S ENT+BONG DNA STL QL NAA+NON-PROBE: NOT DETECTED
SAPO I+II+IV+V RNA STL QL NAA+NON-PROBE: NOT DETECTED
SHIGELLA SP+EIEC IPAH ST NAA+NON-PROBE: NOT DETECTED
V CHOL+PARA+VUL DNA STL QL NAA+NON-PROBE: NOT DETECTED
V CHOLERAE DNA STL QL NAA+NON-PROBE: NOT DETECTED
Y ENTEROCOL DNA STL QL NAA+NON-PROBE: NOT DETECTED

## 2020-11-02 LAB — CALPROTECTIN STL-MCNT: 333 UG/G (ref 0–120)

## 2020-11-03 ENCOUNTER — TELEPHONE (OUTPATIENT)
Dept: GASTROENTEROLOGY | Facility: CLINIC | Age: 55
End: 2020-11-03

## 2020-11-03 NOTE — TELEPHONE ENCOUNTER
Sounds like she could have microscopic colitis.  I would have her do some Pepto-Bismol and see if that does not help until her scope.  Have her call us back Friday with an update.  Thanks

## 2020-11-03 NOTE — TELEPHONE ENCOUNTER
"Call to pt.  Advise per BHAVYA Montilla note.  Verb understanding.      States has persistent diarrhea - yesterday 18x.  \"Can barely make it to the BR\".  Has used dicyclomine without relief.  Tried imodium for some measure of control.  Does note occasional blood in toilet water.      States c/s tentatively scheduled for 11/11 - needs to complete something on line to confirm.  Will do so today.      Asking how to manage symptoms until c/s.    Advise BHAVYA Montilla out of office - will send message to APRN.  Verb understanding.   "

## 2020-11-03 NOTE — TELEPHONE ENCOUNTER
----- Message from CAL Monzon sent at 11/2/2020  3:38 PM EST -----  Please inform Rosa said that her stool test shows elevated levels of inflammation within her colon.  No evidence of a colon infection.  Celiac panel normal.  Await endoscopic evaluation with Dr. William.

## 2020-11-06 ENCOUNTER — LAB REQUISITION (OUTPATIENT)
Dept: LAB | Facility: HOSPITAL | Age: 55
End: 2020-11-06

## 2020-11-06 DIAGNOSIS — Z00.00 ENCOUNTER FOR GENERAL ADULT MEDICAL EXAMINATION WITHOUT ABNORMAL FINDINGS: ICD-10-CM

## 2020-11-06 PROCEDURE — U0004 COV-19 TEST NON-CDC HGH THRU: HCPCS | Performed by: INTERNAL MEDICINE

## 2020-11-07 LAB — SARS-COV-2 RNA RESP QL NAA+PROBE: NOT DETECTED

## 2020-11-11 ENCOUNTER — OUTSIDE FACILITY SERVICE (OUTPATIENT)
Dept: GASTROENTEROLOGY | Facility: CLINIC | Age: 55
End: 2020-11-11

## 2020-11-11 PROCEDURE — 43239 EGD BIOPSY SINGLE/MULTIPLE: CPT | Performed by: INTERNAL MEDICINE

## 2020-11-11 PROCEDURE — 45380 COLONOSCOPY AND BIOPSY: CPT | Performed by: INTERNAL MEDICINE

## 2020-11-18 ENCOUNTER — OFFICE VISIT CONVERTED (OUTPATIENT)
Dept: FAMILY MEDICINE CLINIC | Age: 55
End: 2020-11-18
Attending: NURSE PRACTITIONER

## 2020-11-20 ENCOUNTER — TELEPHONE (OUTPATIENT)
Dept: GASTROENTEROLOGY | Facility: CLINIC | Age: 55
End: 2020-11-20

## 2020-11-20 ENCOUNTER — CONVERSION ENCOUNTER (OUTPATIENT)
Dept: OTHER | Facility: HOSPITAL | Age: 55
End: 2020-11-20

## 2020-11-20 RX ORDER — BUDESONIDE 3 MG/1
9 CAPSULE, COATED PELLETS ORAL DAILY
Qty: 90 CAPSULE | Refills: 3 | Status: SHIPPED | OUTPATIENT
Start: 2020-11-20 | End: 2021-02-18

## 2020-11-20 NOTE — TELEPHONE ENCOUNTER
Antonio Verde RegSched Rep  P [facility] [address]   Phone Number: [phone]             Pt is calling for her results.

## 2020-11-20 NOTE — TELEPHONE ENCOUNTER
Patient called, advised as per Dr. William's note. Patient referred to [email or web address] to research her diagnosis more in depth. Advised if she has any questions to call back.  F/u appointment with Edna, 11/13@1100. She verb understanding and is in agreement with the plan.   Repeat c/s in 5 yrs added to recall.

## 2020-11-20 NOTE — PROGRESS NOTES
Collagenous colitis  I will prescribe Entocort 3 mg, 3 tablets p.o. every morning, #90,2 refills  Office visit Edna 6 to 8 weeks to check progress on Entocort  Polyp benign  Colonoscopy recall 5 years

## 2020-11-20 NOTE — TELEPHONE ENCOUNTER
----- Message from Oneil William MD sent at 11/20/2020  8:52 AM EST -----  Collagenous colitis  I will prescribe Entocort 3 mg, 3 tablets p.o. every morning, #90,2 refills  Office visit Edna 6 to 8 weeks to check progress on Entocort  Polyp benign  Colonoscopy recall 5 years

## 2020-11-30 ENCOUNTER — OFFICE VISIT CONVERTED (OUTPATIENT)
Dept: FAMILY MEDICINE CLINIC | Age: 55
End: 2020-11-30

## 2020-11-30 ENCOUNTER — HOSPITAL ENCOUNTER (OUTPATIENT)
Dept: OTHER | Facility: HOSPITAL | Age: 55
Discharge: HOME | End: 2020-11-30
Attending: NURSE PRACTITIONER

## 2020-12-03 LAB — SARS-COV-2 RNA SPEC QL NAA+PROBE: DETECTED

## 2021-01-06 ENCOUNTER — TELEPHONE (OUTPATIENT)
Dept: GASTROENTEROLOGY | Facility: CLINIC | Age: 56
End: 2021-01-06

## 2021-01-06 NOTE — TELEPHONE ENCOUNTER
Per verbal order of Dr. William, have the patient start taking Entocort one tablet daily for two weeks, he will review her chart tomorrow and will have further instructions at that time.   Patient called. She verb understanding of the instructions.   She states her symptoms started in the middle of December, has taken steroids in the past with a similar reaction.   She states she is currently taking IBprofen for back pain. Questions if it is ok to take it while on Entocort. Advised she may take the medication but must eat a meal and then take the medicine. She verb understanding.

## 2021-01-06 NOTE — TELEPHONE ENCOUNTER
Patient called into the office. She states she has been on Entocort since November. She has developed a rash on her torso, legs and scalp and is has caused extreme itching. She states she did cut back the medication to 2 tablets every morning instead of 3. She states she has had very little change in the rash or the itching. Advised will send an update to Dr. William.

## 2021-01-08 NOTE — TELEPHONE ENCOUNTER
Oneil Valladares MD Haag, Shelley D, RN; Edna Montilla, APRN; [facility]; [facility]  Caller: Unspecified (2 days ago,  3:10 PM)         She is on Entocort for collagenous colitis and it should be tapered anyway at this point.  She has been on it since November.  She should do 1 tablet a day for 2 weeks, 1 tablet every other day for 2 weeks and stop.  She can use Pepto-Bismol for collagenous colitis as needed, or Imodium over-the-counter, good alternatives  She can discuss this all with Edna on January 13 next week at her already scheduled appointment     Called pt and left vm to call back.

## 2021-01-13 ENCOUNTER — OFFICE VISIT (OUTPATIENT)
Dept: GASTROENTEROLOGY | Facility: CLINIC | Age: 56
End: 2021-01-13

## 2021-01-13 VITALS — TEMPERATURE: 97 F | BODY MASS INDEX: 22.82 KG/M2 | WEIGHT: 142 LBS | HEIGHT: 66 IN

## 2021-01-13 DIAGNOSIS — K52.831 COLITIS, COLLAGENOUS: Primary | ICD-10-CM

## 2021-01-13 DIAGNOSIS — Z86.010 PERSONAL HISTORY OF COLONIC POLYPS: ICD-10-CM

## 2021-01-13 PROCEDURE — 99214 OFFICE O/P EST MOD 30 MIN: CPT | Performed by: NURSE PRACTITIONER

## 2021-01-13 RX ORDER — BISMUTH SUBSALICYLATE 262 MG/1
524 TABLET, CHEWABLE ORAL 3 TIMES DAILY
Qty: 180 TABLET | Refills: 2 | Status: SHIPPED | OUTPATIENT
Start: 2021-01-13 | End: 2021-01-13

## 2021-01-13 RX ORDER — BISMUTH SUBSALICYLATE 262 MG/1
524 TABLET, CHEWABLE ORAL 3 TIMES DAILY
Qty: 180 TABLET | Refills: 2 | Status: SHIPPED | OUTPATIENT
Start: 2021-01-13 | End: 2021-06-16

## 2021-01-13 NOTE — PROGRESS NOTES
Chief Complaint   Patient presents with   • Collagenous colitis     HPI    Demi Leija is a  55 y.o. female here for a follow up visit for diarrhea, endoscopic follow-up.  This patient follows Dr. William and myself.  I reviewed endoscopic evaluation dated 11/11/2020 as follows:    EGD with normal findings.  Colonoscopy with normal ileum, polyp, nonbleeding internal hemorrhoids.  Pathology consistent with collagenous colitis and patient was started on Entocort.  Recall colonoscopy 5 years.    Currently she is doing quite well.  She is having 2 formed bowel movements a day with resolution of diarrhea symptoms.  She had to initiate Entocort taper sooner than expected due to the development of a rash which is almost completely resolved with lower dosage.  She denies abdominal pain, rectal pain, rectal bleeding.  She still having quite a bit of gas mainly in the morning.    No nausea, vomiting, acid/heartburn, or dysphagia.    Past Medical History:   Diagnosis Date   • Hypothyroidism    • Post-menopause        Past Surgical History:   Procedure Laterality Date   • BUNIONECTOMY Bilateral    • RHINOPLASTY     • STOMACH SURGERY     • TONSILLECTOMY     • WISDOM TOOTH EXTRACTION         Scheduled Meds:  Outpatient Encounter Medications as of 1/13/2021   Medication Sig Dispense Refill   • dicyclomine (Bentyl) 10 MG capsule Take 1 capsule by mouth 3 (Three) Times a Day Before Meals. 18 capsule 0   • NON FORMULARY Compound testosterone/estogen cream     • progesterone (PROMETRIUM) 200 MG capsule Take 200 mg by mouth Daily.     • Budesonide (ENTOCORT EC) 3 MG 24 hr capsule Take 3 capsules by mouth Daily for 90 days. 90 capsule 3     No facility-administered encounter medications on file as of 1/13/2021.        Continuous Infusions:No current facility-administered medications for this visit.       PRN Meds:.    No Known Allergies    Social History     Socioeconomic History   • Marital status:      Spouse name: Not on  file   • Number of children: 2   • Years of education: HIGH SCHOOL   • Highest education level: Not on file   Occupational History   • Occupation: REALTOR   Tobacco Use   • Smoking status: Never Smoker   • Smokeless tobacco: Never Used   Substance and Sexual Activity   • Alcohol use: Yes     Alcohol/week: 4.0 standard drinks     Types: 4 Glasses of wine per week   • Drug use: Yes     Types: Marijuana     Comment: 4-5 times a year   • Sexual activity: Defer       Family History   Problem Relation Age of Onset   • Arthritis Mother    • Stroke Mother    • Arthritis Father    • Cancer Father         colon   • Stroke Father    • Colon cancer Father    • Hypertension Sister    • Cancer Brother         prostate   • Stroke Brother         x 2   • Hypertension Brother    • Colon polyps Brother        Review of Systems   Constitutional: Negative for activity change, appetite change, fatigue, fever and unexpected weight change.   HENT: Negative for trouble swallowing.    Respiratory: Negative for apnea, cough, choking, chest tightness, shortness of breath and wheezing.    Cardiovascular: Negative for chest pain, palpitations and leg swelling.   Gastrointestinal: Negative for abdominal distention, abdominal pain, anal bleeding, blood in stool, constipation, diarrhea, nausea, rectal pain and vomiting.        + gas       Vitals:    01/13/21 1145   Temp: 97 °F (36.1 °C)       Physical Exam  Constitutional:       Appearance: She is well-developed.   Cardiovascular:      Rate and Rhythm: Normal rate and regular rhythm.      Heart sounds: Normal heart sounds.   Pulmonary:      Effort: Pulmonary effort is normal. No respiratory distress.      Breath sounds: Normal breath sounds. No wheezing.   Abdominal:      General: Bowel sounds are normal. There is no distension.      Palpations: Abdomen is soft. There is no mass.      Tenderness: There is no abdominal tenderness. There is no guarding.      Hernia: No hernia is present.    Neurological:      Mental Status: She is alert and oriented to person, place, and time.   Psychiatric:         Behavior: Behavior normal.       Assessment    Collagenous colitis  Personal history of colon polyps    Plan    Pleasant 55-year-old female seen today in follow-up status post endoscopic evaluation prompted for change in bowel habits with diarrhea demonstrating evidence of collagenous colitis via biopsy on endoscopy exam.  Patient's currently reports resolution of diarrhea symptoms.  She had to taper Entocort sooner than expected due to the development of a rash which has almost completely resolved with lower dosage.  She is currently on budesonide every other day to continue for an additional month then stop.  We discussed treatment with Pepto-Bismol in the future if diarrhea symptoms return to avoid rash which may have been caused by Entocort.  I gave her a prescription to have on hand.  I reviewed all procedural findings and answered all of her questions in great detail.  We also discussed the need to avoid NSAIDs when possible.  I also gave her a pamphlet regarding IBgard to try for complaints of gas.  I like to see her back in 6 weeks to monitor for continued symptom improvement.  She is to call with any questions or concerns in the interim.    Colonoscopy for surveillance purposes 5 years.

## 2021-04-06 ENCOUNTER — TELEPHONE (OUTPATIENT)
Dept: GASTROENTEROLOGY | Facility: CLINIC | Age: 56
End: 2021-04-06

## 2021-04-06 NOTE — TELEPHONE ENCOUNTER
----- Message from Kamaljit Dave sent at 4/6/2021  1:01 PM EDT -----  Patient would like you to call her about her missed appointment on 3/3/2021, as well she also had emergency surgery on Sunday 4/4/2021. Please call patient at [phone].

## 2021-04-08 ENCOUNTER — TELEPHONE (OUTPATIENT)
Dept: GASTROENTEROLOGY | Facility: CLINIC | Age: 56
End: 2021-04-08

## 2021-04-08 NOTE — TELEPHONE ENCOUNTER
Called pt, pt had emergent cholecystectomy on 4/4/21. Pt had missed her prevous appt. Advised will update Edna DUARTE.    Made f/u appt with Edna DUARTE for 5/11/21 @10:15am.

## 2021-04-08 NOTE — TELEPHONE ENCOUNTER
----- Message from Kamaljit Dave sent at 4/6/2021  1:01 PM EDT -----  Patient would like you to call her about her missed appointment on 3/3/2021, as well she also had emergency surgery on Sunday 4/4/2021. Please call patient at [phone].

## 2021-04-09 ENCOUNTER — OFFICE VISIT CONVERTED (OUTPATIENT)
Dept: FAMILY MEDICINE CLINIC | Age: 56
End: 2021-04-09
Attending: NURSE PRACTITIONER

## 2021-04-09 ENCOUNTER — HOSPITAL ENCOUNTER (OUTPATIENT)
Dept: OTHER | Facility: HOSPITAL | Age: 56
Discharge: HOME | End: 2021-04-09
Attending: NURSE PRACTITIONER

## 2021-04-29 ENCOUNTER — TELEPHONE (OUTPATIENT)
Dept: FAMILY MEDICINE CLINIC | Facility: CLINIC | Age: 56
End: 2021-04-29

## 2021-04-29 NOTE — TELEPHONE ENCOUNTER
Caller: [employer]    Relationship: Radiology    Best call back number: [phone]    What does billing need from the patient: DEMETRIO FROM [facility] [employer] CALLED STATING BACK IN September 2020 JEOVANNY ARCHIBALD ORDERED A CAT SCAN OF ABDOMEN/PELVIS WITH AND WITHOUT. PATIENT'S INSURANCE STATED [place] [address] FOR THE TEST TO BE AUTHORIZED AND PAID.  PREVIOUS AUTHORIZATION # [id number].  DEMETRIO SPOKE WITH INSURANCE COMPANY AND THEY ADVISED FOR DOCTOR TO CALL THEM AND CHANGE THE CODE TO 34727.

## 2021-05-14 VITALS — HEIGHT: 66 IN | BODY MASS INDEX: 22.5 KG/M2 | WEIGHT: 140 LBS
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
## 2021-05-18 NOTE — PROGRESS NOTES
Demi Leija  1965     Office/Outpatient Visit    Visit Date: Mon, Nov 30, 2020 03:55 pm    Provider: Ailyn Vivas N.P. (Assistant: Alisha Donald, )    Location: [facility]        Electronically signed by Ailyn Vivas N.P. on  11/30/2020 04:18:23 PM                             Subjective:        CC: Ms. Leija is a 55 year old White female.  ear pain, not any better;         HPI: patient seen 11/18/2020 for right ear pain. No improvement after taking 5 days of antibiotics-Augmentin. Has had chronic issues with her ears and has previously been on allergy medication, but is not currently taking. Denies fever, nausea, vomiting. Exposure to her fiance with covid symptoms.    ROS:     CONSTITUTIONAL:  Negative for chills, fatigue and fever.      EYES:  Negative for blurred vision.      E/N/T:  Positive for ear pain ( right ).   Negative for nasal congestion, frequent rhinorrhea, hoarseness, sinus pressure or sore throat.      CARDIOVASCULAR:  Negative for chest pain and pedal edema.      RESPIRATORY:  Negative for recent cough and dyspnea.      GASTROINTESTINAL:  Negative for abdominal pain, constipation, diarrhea, nausea and vomiting.      GENITOURINARY:  Negative for dysuria and frequent urination.      MUSCULOSKELETAL:  Negative for myalgias.      NEUROLOGICAL:  Negative for headaches.      PSYCHIATRIC:  Negative for anxiety, depression, and sleep disturbances.          Past Medical History / Family History / Social History:         Last Reviewed on 11/30/2020 04:03 PM by Ailyn Vivas    Past Medical History:             PAST MEDICAL HISTORY             GYNECOLOGICAL HISTORY:    2016  Menopause  (after miscarriage)    Demi Sood  [facility] HEALTH MAINTENANCE             COLORECTAL CANCER SCREENING: Other         Surgical History:         rhinoplasty 1995;  deviated septum  from previous accident in childhood;    mini tummytuck 2012; double bunionectomy - 2010          Family History:         Positive for Hypertension.      Positive for Colon Cancer ( father --  70s ) and Prostate Cancer ( brother ).      Positive for acid reflux and IBS - brother and sister.      Positive for Cerebrovascular Accident.      Positive for Type 2 Diabetes ( father ).          Social History:     Occupation:      Marital Status: engaged     Children: 2 children         Tobacco/Alcohol/Supplements:     Last Reviewed on 2020 04:03 PM by Ailyn Vivas    Tobacco: She has never smoked.          Alcohol: Frequency: Once a week         Immunizations:     influenza, injectable, quadrivalent, preservative free (FLUZONE QUAD 1401-2495) 10/16/2020        Allergies:     Last Reviewed on 2020 04:03 PM by Ailyn Vivas    No Known Allergies.        Current Medications:     Last Reviewed on 2020 04:03 PM by Ailyn Vivas    proGESTerone  [100mg capsules once daily]    ESTRADIOL / TESTOSTERONE 2MG/20MG PER ML  [APPLY 2 CLICKS TOPICALLY TWICE DAILY]    Augmentin 875-125 mg oral tablet [take 1 tablet by oral route every 12 hours x 10 days]    Diflucan 150 mg oral tablet [take 1 tablet (150 mg) by oral route now  may repeat in 1 week]    etodolac 300 mg oral capsule [take 1 capsule (300 mg) by oral route 2 times per day with food]    budesonide 3 mg oral Capsule, Delayed and Extended Release [3 caps daily]        Objective:        Vitals:         Current: 2020 3:57:30 PM    Ht:  5 ft, 5.5 in;  Wt: 143 lbs;  BMI: 23.4T: 97.8 F (temporal);  BP: 142/87 mm Hg (right arm, sitting);  P: 57 bpm (right arm (BP Cuff), sitting)O2 Sat: 93 % (room air)        Exams:     PHYSICAL EXAM:     GENERAL:  well developed and nourished; appropriately groomed; in no apparent distress;     EYES: PERRL, EOMI     E/N/T: EARS: both TMs are have fluid behind them;  NOSE:  normal nasal mucosa, septum, turbinates, and sinuses; OROPHARYNX:  normal mucosa, dentition, gingiva, and posterior  pharynx;     RESPIRATORY: normal respiratory rate and pattern with no distress; normal breath sounds with no rales, rhonchi, wheezes or rubs;     CARDIOVASCULAR: normal rate; rhythm is regular;  no systolic murmur; no edema;     LYMPHATIC: no enlargement of cervical or facial nodes; no supraclavicular nodes;     BREAST/INTEGUMENT: no rashs or lesions noted;     MUSCULOSKELETAL:  gait normal;     NEUROLOGIC: mental status: alert and oriented x 3; GROSSLY INTACT     PSYCHIATRIC:  appropriate affect and demeanor; normal speech pattern; grossly normal memory;         Assessment:         H92.01   Otalgia, right ear       Z20.828   Contact with and (suspected) exposure to other viral communicable diseases           ORDERS:         Meds Prescribed:       [New Rx] ZyrTEC 10 mg oral tablet [take 1 tablet (10 mg) by oral route once daily], #30 (thirty) tablets, Refills: 0 (zero)       [New Rx] fluticasone propionate 50 mcg/actuation Intranasal Spray, Suspension [inhale 1 spray (50 mcg) in each nostril by intranasal route 2 times per day], #15.8 (fifteen point eight) applicators, Refills: 0 (zero)         Lab Orders:       [id number]  COVID 19 Testing  (Send-Out)                      Plan:         Otalgia, right ear          Prescriptions:       [New Rx] ZyrTEC 10 mg oral tablet [take 1 tablet (10 mg) by oral route once daily], #30 (thirty) tablets, Refills: 0 (zero)       [New Rx] fluticasone propionate 50 mcg/actuation Intranasal Spray, Suspension [inhale 1 spray (50 mcg) in each nostril by intranasal route 2 times per day], #15.8 (fifteen point eight) applicators, Refills: 0 (zero)         Contact with and (suspected) exposure to other viral communicable diseases    LABORATORY:  Labs ordered to be performed today include COVID 19 Testing.      RECOMMENDATIONS given include: Educated patient that we are awaiting covid-19 test results. During this time quarantine, self isolate and self monitor. Educated to call or report to the ER  if having worsening shortness of breath, cough, high fever, or new symptoms. Patient understood these instructions..            Orders:       [id number]  COVID 19 Testing  (Send-Out)                  Charge Capture:         Primary Diagnosis:     H92.01  Otalgia, right ear           Orders:      [id number]  Office/outpatient visit; established patient, level 3  (In-House)              Z20.828  Contact with and (suspected) exposure to other viral communicable diseases

## 2021-05-18 NOTE — PROGRESS NOTES
Demi LeijaRenee  1965     Office/Outpatient Visit    Visit Date: Fri, Apr 9, 2021 03:03 pm    Provider: Desiree Huffman N.P. (Assistant: Laura Agustin MA)    Location: [facility]        Electronically signed by Desiree Huffman N.P. on  04/16/2021 08:03:16 PM                             Subjective:        CC: Ms. Lejia is a 55 year old White female.  presents today due to cough         HPI:           Cough noted.  It has been present for the past several weeks.  Respiratory symptoms include cough.   She denies chest congestion, chest tightness or wheezing.  Other symptoms include allergy symptoms.  She denies body aches, chest congestion or fever.  Sputum is described as none.  She has already tried to relieve the symptoms with cough medication, decongestants, and throat lozenges/sprays.  Medical history is significant for recently had lap choley  this is when her cough started.      ROS:     CONSTITUTIONAL:  Negative for chills and fever.      CARDIOVASCULAR:  Negative for chest pain and pedal edema.      RESPIRATORY:  Positive for recent cough.   Negative for dyspnea, hemoptysis, pleuritic chest pain or frequent wheezing.      GASTROINTESTINAL:  Positive for abdominal pain ( tender from recent surgery ).   Negative for acid reflux symptoms, constipation, diarrhea, heartburn, nausea or vomiting.      ALLERGIC/IMMUNOLOGIC:  Positive for seasonal allergies.      PSYCHIATRIC:  Negative for anxiety, depression, sleep disturbance and suicidal thoughts.          Past Medical History / Family History / Social History:         Last Reviewed on 4/16/2021 08:01 PM by Desiree Huffman    Past Medical History:             PAST MEDICAL HISTORY             GYNECOLOGICAL HISTORY:    2016  Menopause  (after miscarriage)    Demi Sood  [facility]         PREVENTIVE HEALTH MAINTENANCE             COLORECTAL CANCER SCREENING: Other         Surgical History:         rhinoplasty 1995;  deviated  septum  from previous accident in childhood;    mini tummytuck ; double bunionectomy -          Family History:         Positive for Hypertension.      Positive for Colon Cancer ( father --  70s ) and Prostate Cancer ( brother ).      Positive for acid reflux and IBS - brother and sister.      Positive for Cerebrovascular Accident.      Positive for Type 2 Diabetes ( father ).          Social History:     Occupation:      Marital Status: engaged     Children: 2 children         Tobacco/Alcohol/Supplements:     Last Reviewed on 2021 03:08 PM by Laura Agustin    Tobacco: She has never smoked.          Alcohol: Frequency: Once a week         Current Problems:     Last Reviewed on 2021 08:00 PM by Desiree Huffman    Headache    Pain in joints of unspecified hand    Low back pain    Diarrhea, unspecified    Chronic serous otitis media, right ear    Cough    Acquired absence of other specified parts of digestive tract        Immunizations:     influenza, injectable, quadrivalent, preservative free (FLUZONE QUAD [id number]) 10/16/2020        Allergies:     Last Reviewed on 2021 03:08 PM by Laura Agustin    No Known Allergies.        Current Medications:     Last Reviewed on 2021 03:08 PM by Laura Agustin    proGESTerone  [100mg capsules once daily]    ESTRADIOL / TESTOSTERONE 2MG/20MG PER ML  [APPLY 2 CLICKS TOPICALLY TWICE DAILY]    ZyrTEC 10 mg oral tablet [take 1 tablet (10 mg) by oral route once daily]    amoxicillin-pot clavulanate 500-125 mg oral tablet [TAKE ONE TABLET BY MOUTH EVERY DAY]    Medrol (Quique) 4 mg oral Tablet, Dose Pack [take as directed]    albuterol sulfate 90 mcg/actuation Inhalation HFA Aerosol Inhaler [inhale 1 - 2 puffs (90 - 180 mcg) by inhalation route every 4 hours as needed]    Bromfed DM 2-30-10 mg/5 mL oral Syrup [take 10 milliliters by oral route every 4 hours PRN]        Objective:        Vitals:         Current: 2021 3:08:02 PM     Ht:  5 ft, 5.5 in;  Wt: 139.2 lbs;  BMI: 22.8T: 98.4 F (temporal);  BP: 117/77 mm Hg (left arm, sitting);  P: 77 bpm (left arm (BP Cuff), sitting)O2 Sat: 97 % (room air)        Exams:     PHYSICAL EXAM:     GENERAL: vital signs recorded - well developed, well nourished;  no apparent distress;     RESPIRATORY: normal appearance and symmetric expansion of chest wall; normal respiratory rate and pattern with no distress; normal breath sounds with no rales, rhonchi, wheezes or rubs;     CARDIOVASCULAR: normal rate; rhythm is regular;  no edema;     GASTROINTESTINAL: mild diffuse and related to recent laproscopic surgery tenderness;     BREAST/INTEGUMENT: trochar sites without s/s infection;     MUSCULOSKELETAL: normal gait; normal range of motion of all major muscle groups; no limb or joint pain with range of motion;     NEUROLOGIC: mental status: alert and oriented x 3;     PSYCHIATRIC: appropriate affect and demeanor; normal speech pattern; normal thought and perception;         Assessment:         R05   Cough       Z90.49   Acquired absence of other specified parts of digestive tract           ORDERS:         Radiology/Test Orders:       [id number]  Radiologic exam chest 2 views  (Send-Out)                      Plan:         CoughRecommend that she continue mucinex, humidifier , throat spray.  We will get a CXR given her recent surgery and risk of pneumonia secondary to post op status.  Suspect may be irritation from the anethesia and combination of allergies.  Pending CXR and follow up if no improvement or worsening in 3-5 days        RADIOLOGY:  I have ordered a chest x-ray (PA and lateral) to be done today.            Orders:       [id number]  Radiologic exam chest 2 views  (Send-Out)              Acquired absence of other specified parts of digestive tractconsider pneumonia vs irritation from anethesia vs allergic rhinitis            Charge Capture:         Primary Diagnosis:     R05  Cough           Orders:      [id number]   Office/outpatient visit; established patient, level 3  (In-House)              Z90.49  Acquired absence of other specified parts of digestive tract

## 2021-05-18 NOTE — PROGRESS NOTES
Demi Leija.  1965     Office/Outpatient Visit    Visit Date: Fri, Oct 16, 2020 02:48 pm    Provider: Desiree Huffman N.P. (Assistant: Laura Agustin MA)    Location: [facility]        Electronically signed by Desiree Huffman N.P. on  10/19/2020 06:59:53 AM                             Subjective:        CC: Moshe Littcarr  Tana Leija is a 54 year old White female.  This is her first visit to the clinic.  establish care;         HPI:           Right upper quadrant pain noted.  Reports that this started suddenly on August 30thThis is located primarily in the right upper quadrant.  There is some radiation to the lower chest.  It began several months ago.  The onset of pain occurred with no apparent trigger.  Associated symptoms include change in bowel habits, constipation, diarrhea, unsure if related to current situation , but has been off and on for years day hx of arthralgias, mucus in stools and alternating diarrhea constipation - but the diarrhea is new and reports as urgent/explosive  no blood.  Some cramping and increase in flatus..  She denies anorexia or fever.  has had CTs scans, has never had endoscopy She has recently started on bioidentical hormone treatment with her GYN, but nothing else.  No change in diet.  Was previously on thyroid medication but was taken off about 4 months agoShe reports all labs, stool studies in the past have been negative.  siblings have been diagnosed with IBS    ROS:     CONSTITUTIONAL:  Negative for chills, fatigue and fever.      CARDIOVASCULAR:  Negative for chest pain and pedal edema.      RESPIRATORY:  Negative for recent cough and dyspnea.      GASTROINTESTINAL:  Positive for abdominal pain ( epigastric; RUQ ), constipation and diarrhea.   Negative for heartburn, hematochezia, melena, nausea or vomiting.      MUSCULOSKELETAL:  Positive for joint stiffness (diffuse joint stiffness - has been going off and on for years   but seems worse over the past few months).      PSYCHIATRIC:  Positive for anxiety.   Negative for depression, feelings of stress or suicidal thoughts.          Past Medical History / Family History / Social History:         Past Medical History:             PAST MEDICAL HISTORY             GYNECOLOGICAL HISTORY:    2016  Menopause  (after miscarriage)    Demi Sood  - [facility]         PREVENTIVE HEALTH MAINTENANCE             COLORECTAL CANCER SCREENING: Other         Surgical History:         rhinoplasty ;  deviated septum  from previous accident in childhood;    mini tummytuck ; double bunionectomy -          Family History:         Positive for Hypertension.      Positive for Colon Cancer ( father --  70s ) and Prostate Cancer ( brother ).      Positive for acid reflux and IBS - brother and sister.      Positive for Cerebrovascular Accident.      Positive for Type 2 Diabetes ( father ).          Social History:     Occupation:      Marital Status: engaged     Children: 2 children         Tobacco/Alcohol/Supplements:     Last Reviewed on 10/16/2020 02:54 PM by Laura Agustin    Tobacco: She has never smoked.          Alcohol: Frequency: Once a week         Current Problems:     Headache    Encounter for screening for other viral diseases    Encounter for immunization    Pain in joints of unspecified hand    Low back pain    Right upper quadrant pain        Immunizations:     None        Allergies:     Last Reviewed on 10/16/2020 02:53 PM by Laura Agustin    No Known Allergies.        Current Medications:     Last Reviewed on 10/16/2020 02:53 PM by Laura Agustin    proGESTerone  [100mg capsules once daily]    ESTRADIOL / TESTOSTERONE 2MG/20MG PER ML [APPLY 2 CLICKS TOPICALLY TWICE DAILY]        Objective:        Vitals:         Current: 10/16/2020 2:55:45 PM    Ht:  5 ft, 5.5 in;  Wt: 144 lbs;  BMI: 23.6T: 97.5 F (temporal);  BP: 125/84 mm Hg (left arm,  sitting);  P: 60 bpm (left arm (BP Cuff), sitting)        Exams:     PHYSICAL EXAM:     GENERAL: vital signs recorded - well developed, well nourished;  no apparent distress;     NECK: range of motion is normal;     RESPIRATORY: normal appearance and symmetric expansion of chest wall; normal respiratory rate and pattern with no distress; normal breath sounds with no rales, rhonchi, wheezes or rubs;     CARDIOVASCULAR: normal rate; rhythm is regular;  no edema;     GASTROINTESTINAL: nontender; hyperactive bowel sounds; no organomegaly;     MUSCULOSKELETAL: normal gait; normal range of motion of all major muscle groups; no limb or joint pain with range of motion;     NEUROLOGIC: mental status: alert and oriented x 3;     PSYCHIATRIC: appropriate affect and demeanor; normal speech pattern; normal thought and perception;         Assessment:         Z23   Encounter for immunization       M25.549   Pain in joints of unspecified hand       R10.11   Right upper quadrant pain       R19.7   Diarrhea, unspecified           ORDERS:         Lab Orders:       [id number]  MARILEE - [facility] SCOT w/Reflex  (Send-Out)            15548  RAPII [facility] Arthritis Profile  (Send-Out)              Procedures Ordered:       [id number]  Immunization administration; one vaccine  (In-House)              Other Orders:       00815  Influenza virus vaccine, quadrivalent, split virus, preservative free 3 years of age & older  (In-House)                      Plan:         Encounter for immunization          Immunizations:       24818  Immunization administration; one vaccine  (In-House)            82080  Influenza virus vaccine, quadrivalent, split virus, preservative free 3 years of age & older  (In-House)                Dose (ml): 0.5  Site: left deltoid  Route: intramuscular  Administered by: Laura Agustin          : [employer]  Lot #: [id number]  Exp: 06/30/2021          NDC: [address]-06        Pain in joints of unspecified hand     LABORATORY:  Labs ordered to be performed today include SCOT with Reflex and Arthritis Profile.            Orders:       [id number]  MARILEE - [facility] SCOT w/Reflex  (Send-Out)            45821  RAPII - [facility] Arthritis Profile  (Send-Out)              Right upper quadrant painWill get a copy of records.  I would recommend that we get her in with GI for evalaution .  (would recommend GI  - not general surgery since other GI symptoms present)  evaluate labs to see if any underlying issues from the joint pain        Diarrhea, unspecifiedgiven her symptoms, sounds like IBS, but will get records and refer to GI            Patient Recommendations:        For  Pain in joints of unspecified hand:    I also recommend ^.              Charge Capture:         Primary Diagnosis:     Z23  Encounter for immunization           Orders:      22706  Office visit - new pt, level 3  (In-House)            77447  Immunization administration; one vaccine  (In-House)            13171  Influenza virus vaccine, quadrivalent, split virus, preservative free 3 years of age & older  (In-House)              M25.549  Pain in joints of unspecified hand     R10.11  Right upper quadrant pain     R19.7  Diarrhea, unspecified         ADDENDUMS:      ____________________________________    Addendum: 10/25/2020 12:01 JUSTICE - Desiree Huffman  [id number]        Add  [id number]

## 2021-06-16 ENCOUNTER — OFFICE VISIT (OUTPATIENT)
Dept: FAMILY MEDICINE CLINIC | Age: 56
End: 2021-06-16

## 2021-06-16 VITALS
WEIGHT: 140.2 LBS | HEIGHT: 66 IN | SYSTOLIC BLOOD PRESSURE: 132 MMHG | HEART RATE: 70 BPM | DIASTOLIC BLOOD PRESSURE: 87 MMHG | TEMPERATURE: 98.8 F | BODY MASS INDEX: 22.53 KG/M2

## 2021-06-16 DIAGNOSIS — G89.29 CHRONIC LOW BACK PAIN, UNSPECIFIED BACK PAIN LATERALITY, UNSPECIFIED WHETHER SCIATICA PRESENT: Chronic | ICD-10-CM

## 2021-06-16 DIAGNOSIS — M54.50 CHRONIC LOW BACK PAIN, UNSPECIFIED BACK PAIN LATERALITY, UNSPECIFIED WHETHER SCIATICA PRESENT: Chronic | ICD-10-CM

## 2021-06-16 DIAGNOSIS — M54.16 LUMBAR RADICULOPATHY: Chronic | ICD-10-CM
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
## 2021-06-16 PROCEDURE — 99214 OFFICE O/P EST MOD 30 MIN: CPT | Performed by: NURSE PRACTITIONER

## 2021-06-16 RX ORDER — ALBUTEROL SULFATE 90 UG/1
AEROSOL, METERED RESPIRATORY (INHALATION)
COMMUNITY
Start: 2021-04-09
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
## 2021-07-02 VITALS
TEMPERATURE: 97.8 F | OXYGEN SATURATION: 93 % | BODY MASS INDEX: 22.98 KG/M2 | SYSTOLIC BLOOD PRESSURE: 142 MMHG | DIASTOLIC BLOOD PRESSURE: 87 MMHG | HEART RATE: 57 BPM | WEIGHT: 143 LBS | HEIGHT: 66 IN

## 2021-07-02 VITALS
WEIGHT: 142.2 LBS | TEMPERATURE: 97.2 F | DIASTOLIC BLOOD PRESSURE: 85 MMHG | BODY MASS INDEX: 22.85 KG/M2 | HEIGHT: 66 IN | HEART RATE: 68 BPM | SYSTOLIC BLOOD PRESSURE: 127 MMHG

## 2021-07-02 VITALS
HEART RATE: 60 BPM | SYSTOLIC BLOOD PRESSURE: 125 MMHG | BODY MASS INDEX: 23.14 KG/M2 | TEMPERATURE: 97.5 F | DIASTOLIC BLOOD PRESSURE: 84 MMHG | WEIGHT: 144 LBS | HEIGHT: 66 IN

## 2021-07-02 VITALS
HEART RATE: 77 BPM | TEMPERATURE: 98.4 F | DIASTOLIC BLOOD PRESSURE: 77 MMHG | BODY MASS INDEX: 22.37 KG/M2 | WEIGHT: 139.2 LBS | OXYGEN SATURATION: 97 % | SYSTOLIC BLOOD PRESSURE: 117 MMHG | HEIGHT: 66 IN

## 2021-08-30 ENCOUNTER — TELEPHONE (OUTPATIENT)
Dept: GASTROENTEROLOGY | Facility: CLINIC | Age: 56
End: 2021-08-30

## 2021-08-30 DIAGNOSIS — R19.7 DIARRHEA OF PRESUMED INFECTIOUS ORIGIN: Primary | ICD-10-CM

## 2021-08-30 NOTE — TELEPHONE ENCOUNTER
Called pt, she states she is having several episodes on diarrhea and urgency everyday x8 days.  Has been taking prescribed pepto with out results.  Also states she has taken imodium with out results. Pt states she did start a supplement that had magnesium in it. I advised not to take it bc it can cause diarrhea.  Pt states she is having cramping but denies abd pain, n/v, and fever. Advised will send a msg to Dr William for recommendations for diarrhea.     Msg sent to Dr William.

## 2021-08-30 NOTE — TELEPHONE ENCOUNTER
Oneil Valladares MD Stowers, Sharon G, RN  Caller: Unspecified (Today,  9:17 AM)  CBC, CMP, C. difficile toxin, GI PCR, she should come do this test today       Called pt and advised of Dr. William's note.  Pt verbalized understanding. [facility] [place] [facility] Faxed to [employer] at .    Orders entered for labs.  Msg sent to Dr William to cosign.

## 2021-08-30 NOTE — TELEPHONE ENCOUNTER
----- Message from Fermin Alonzo sent at 8/30/2021  8:42 AM EDT -----  Regarding: Flare Up  Contact: [phone]  Pt called stating having stomach flare up for nearly 10 days without relief.  Please contact pt to advise what should do.  Thank You

## 2021-08-31 LAB
ALBUMIN SERPL-MCNC: 4.6 G/DL (ref 3.8–4.9)
ALBUMIN/GLOB SERPL: 2 {RATIO} (ref 1.2–2.2)
ALP SERPL-CCNC: 56 IU/L (ref 48–121)
ALT SERPL-CCNC: 14 IU/L (ref 0–32)
AST SERPL-CCNC: 16 IU/L (ref 0–40)
BASOPHILS # BLD AUTO: 0.1 X10E3/UL (ref 0–0.2)
BASOPHILS NFR BLD AUTO: 1 %
BILIRUB SERPL-MCNC: 0.9 MG/DL (ref 0–1.2)
BUN SERPL-MCNC: 17 MG/DL (ref 6–24)
BUN/CREAT SERPL: 18 (ref 9–23)
CALCIUM SERPL-MCNC: 9.4 MG/DL (ref 8.7–10.2)
CHLORIDE SERPL-SCNC: 104 MMOL/L (ref 96–106)
CO2 SERPL-SCNC: 24 MMOL/L (ref 20–29)
CREAT SERPL-MCNC: 0.93 MG/DL (ref 0.57–1)
EOSINOPHIL # BLD AUTO: 0.1 X10E3/UL (ref 0–0.4)
EOSINOPHIL NFR BLD AUTO: 1 %
ERYTHROCYTE [DISTWIDTH] IN BLOOD BY AUTOMATED COUNT: 11.9 % (ref 11.7–15.4)
GLOBULIN SER CALC-MCNC: 2.3 G/DL (ref 1.5–4.5)
GLUCOSE SERPL-MCNC: 86 MG/DL (ref 65–99)
HCT VFR BLD AUTO: 41.2 % (ref 34–46.6)
HGB BLD-MCNC: 14.1 G/DL (ref 11.1–15.9)
IMM GRANULOCYTES # BLD AUTO: 0 X10E3/UL (ref 0–0.1)
IMM GRANULOCYTES NFR BLD AUTO: 1 %
LYMPHOCYTES # BLD AUTO: 2.7 X10E3/UL (ref 0.7–3.1)
LYMPHOCYTES NFR BLD AUTO: 31 %
MCH RBC QN AUTO: 31.3 PG (ref 26.6–33)
MCHC RBC AUTO-ENTMCNC: 34.2 G/DL (ref 31.5–35.7)
MCV RBC AUTO: 92 FL (ref 79–97)
MONOCYTES # BLD AUTO: 0.8 X10E3/UL (ref 0.1–0.9)
MONOCYTES NFR BLD AUTO: 9 %
NEUTROPHILS # BLD AUTO: 4.9 X10E3/UL (ref 1.4–7)
NEUTROPHILS NFR BLD AUTO: 57 %
PLATELET # BLD AUTO: 305 X10E3/UL (ref 150–450)
POTASSIUM SERPL-SCNC: 4.4 MMOL/L (ref 3.5–5.2)
PROT SERPL-MCNC: 6.9 G/DL (ref 6–8.5)
RBC # BLD AUTO: 4.5 X10E6/UL (ref 3.77–5.28)
SODIUM SERPL-SCNC: 140 MMOL/L (ref 134–144)
WBC # BLD AUTO: 8.5 X10E3/UL (ref 3.4–10.8)

## 2021-09-01 ENCOUNTER — APPOINTMENT (OUTPATIENT)
Dept: WOMENS IMAGING | Facility: HOSPITAL | Age: 56
End: 2021-09-01

## 2021-09-01 ENCOUNTER — TELEPHONE (OUTPATIENT)
Dept: GASTROENTEROLOGY | Facility: CLINIC | Age: 56
End: 2021-09-01

## 2021-09-01 LAB — C DIFF TOX A+B STL QL IA: NEGATIVE

## 2021-09-01 PROCEDURE — 77061 BREAST TOMOSYNTHESIS UNI: CPT | Performed by: RADIOLOGY

## 2021-09-01 PROCEDURE — G0279 TOMOSYNTHESIS, MAMMO: HCPCS | Performed by: RADIOLOGY

## 2021-09-01 PROCEDURE — 77065 DX MAMMO INCL CAD UNI: CPT | Performed by: RADIOLOGY

## 2021-09-01 NOTE — TELEPHONE ENCOUNTER
----- Message from Oneil William MD sent at 8/31/2021  9:08 AM EDT -----  Blood test labs normal await stool studies

## 2021-09-07 ENCOUNTER — TELEPHONE (OUTPATIENT)
Dept: GASTROENTEROLOGY | Facility: CLINIC | Age: 56
End: 2021-09-07

## 2021-09-07 NOTE — TELEPHONE ENCOUNTER
Per Oneil William MD Stowers, Sharon G, RN; Julia Javier, RN  Caller: Unspecified (Today,  9:51 AM)  Stool studies negative, please call in Entocort 3 mg, 3 tablets p.o. every morning, #90, 3 refills  Office visit Edna 6 to 8 weeks     Rx called into [facility] in [facility].     Follow up appt made with Edna DUARTE 10/14/21 @8:45am.

## 2021-09-07 NOTE — TELEPHONE ENCOUNTER
----- Message from Oneil William MD sent at 9/3/2021  8:39 AM EDT -----  C. difficile negative  ----- Message -----  From: [facility] Reflab Results In  Sent: 9/1/2021   8:08 PM EDT  To: Oneil William MD

## 2021-10-14 ENCOUNTER — OFFICE VISIT (OUTPATIENT)
Dept: GASTROENTEROLOGY | Facility: CLINIC | Age: 56
End: 2021-10-14

## 2021-10-14 VITALS — WEIGHT: 142.2 LBS | HEIGHT: 65 IN | TEMPERATURE: 96.3 F | BODY MASS INDEX: 23.69 KG/M2

## 2021-10-14 DIAGNOSIS — R19.7 DIARRHEA OF PRESUMED INFECTIOUS ORIGIN: ICD-10-CM

## 2021-10-14 DIAGNOSIS — K52.831 COLITIS, COLLAGENOUS: Primary | ICD-10-CM

## 2021-10-14 PROCEDURE — 99214 OFFICE O/P EST MOD 30 MIN: CPT | Performed by: NURSE PRACTITIONER

## 2021-10-14 RX ORDER — MONTELUKAST SODIUM 4 MG/1
1 TABLET, CHEWABLE ORAL DAILY PRN
Qty: 90 TABLET | Refills: 3 | Status: SHIPPED | OUTPATIENT
Start: 2021-10-14

## 2021-10-14 RX ORDER — L.RHAMNOSUS/B.ANIMALIS(LACTIS) 3B CELL
1 CAPSULE ORAL DAILY
Qty: 30 CAPSULE | Refills: 5 | Status: SHIPPED | OUTPATIENT
Start: 2021-10-14

## 2021-10-14 RX ORDER — MONTELUKAST SODIUM 4 MG/1
1 TABLET, CHEWABLE ORAL DAILY PRN
Qty: 90 TABLET | Refills: 3 | Status: SHIPPED | OUTPATIENT
Start: 2021-10-14 | End: 2021-10-14

## 2021-10-14 NOTE — PROGRESS NOTES
Chief Complaint   Patient presents with   • Collagenous colitis       HPI    Demi Leija is a  55 y.o. female here for a follow up visit for collagenous colitis.    This patient follows with Dr. William and myself.    Diagnosed with CC following endoscopic exam in November of last year.  Treated with Entocort.  She also has a personal history of colon polyps--due for follow-up surveillance colonoscopy 2025.    More recently patient complained of diarrhea symptoms in August ended up starting Entocort after negative stool testing.    Today patient reports tapering off of Entocort.  Diarrhea has resolved.  She is concerned about the return of diarrhea moving forward however.  Currently passing formed stools but still complains of excessive gas and bloating.  She also gets postcholecystectomy diarrhea symptoms with fatty meals.  She is asking about medication she can have on hand to use as needed.  Pepto-Bismol historically has not helped.  Denies rectal bleeding, abdominal pain, or rectal pain on visit today.  She still taking NSAIDs unfortunately for joint pain.    Past Medical History:   Diagnosis Date   • Acquired absence of other specified parts of digestive tract    • Chronic serous otitis media    • Diarrhea, unspecified    • Headache    • Hypothyroidism    • Pain in joints of unspecified hand    • Post-menopause        Past Surgical History:   Procedure Laterality Date   • ABDOMINOPLASTY  2012    mini   • BUNIONECTOMY Bilateral 2010   • CHOLECYSTECTOMY  2021   • RHINOPLASTY  1995   • STOMACH SURGERY     • TONSILLECTOMY     • WISDOM TOOTH EXTRACTION         Scheduled Meds:     Continuous Infusions: No current facility-administered medications for this visit.      PRN Meds:     No Known Allergies    Social History     Socioeconomic History   • Marital status: Single   • Number of children: 2   • Years of education: HIGH SCHOOL   Tobacco Use   • Smoking status: Never Smoker   • Smokeless tobacco: Never Used    Substance and Sexual Activity   • Alcohol use: Yes     Alcohol/week: 4.0 standard drinks     Types: 4 Glasses of wine per week   • Drug use: Yes     Types: Marijuana     Comment: 4-5 times a year   • Sexual activity: Defer       Family History   Problem Relation Age of Onset   • Arthritis Mother    • Stroke Mother    • Arthritis Father    • Cancer Father         colon   • Stroke Father    • Colon cancer Father    • Diabetes Father    • Hypertension Sister    • Cancer Brother         prostate   • Stroke Brother         x 2   • Hypertension Brother    • Colon polyps Brother    • SARA disease Brother    • Irritable bowel syndrome Brother    • Hypertension Other        Review of Systems   Constitutional: Negative for activity change, appetite change, fatigue, fever and unexpected weight change.   HENT: Negative for trouble swallowing.    Respiratory: Negative for apnea, cough, choking, chest tightness, shortness of breath and wheezing.    Cardiovascular: Negative for chest pain, palpitations and leg swelling.   Gastrointestinal: Negative for abdominal distention, abdominal pain, anal bleeding, blood in stool, constipation, diarrhea, nausea, rectal pain and vomiting.       Vitals:    10/14/21 0857   Temp: 96.3 °F (35.7 °C)       Physical Exam  Constitutional:       Appearance: She is well-developed.   Abdominal:      General: Bowel sounds are normal. There is no distension.      Palpations: Abdomen is soft. There is no mass.      Tenderness: There is no abdominal tenderness. There is no guarding.      Hernia: No hernia is present.   Skin:     General: Skin is warm and dry.      Capillary Refill: Capillary refill takes less than 2 seconds.   Neurological:      Mental Status: She is alert and oriented to person, place, and time.   Psychiatric:         Behavior: Behavior normal.     Assessment    Diagnoses and all orders for this visit:    1. Colitis, collagenous (Primary)  -     Gastrointestinal Panel, PCR - Stool, Per  Rectum  -     Clostridium Difficile Toxin, PCR - Stool, Per Rectum    2. Diarrhea of presumed infectious origin    Other orders  -     Discontinue: colestipol (COLESTID) 1 g tablet; Take 1 tablet by mouth Daily As Needed (Diarrhea).  Dispense: 90 tablet; Refill: 3  -     Probiotic Product (NCR Tehchnosolutions) capsule; Take 1 tablet by mouth Daily.  Dispense: 30 capsule; Refill: 5  -     colestipol (COLESTID) 1 g tablet; Take 1 tablet by mouth Daily As Needed (Diarrhea).  Dispense: 90 tablet; Refill: 3    Plan    Very pleasant 55-year-old female seen today for collagenous colitis reporting resolution of diarrhea symptoms after short course of budesonide.  She is concerned about return of diarrhea symptoms moving forward.  She had significant delay in stool testing given the holiday weekend and some technical difficulties with [facility].  As a precautionary measure recommend she take home stool kits in case diarrhea returns within the next 6 months and we can retest her stool in a timely fashion.  Instructed the patient to use the outpatient lab at the hospital for prompt her results.  She is also inquiring about medication to have on hand with episodes of diarrhea that correlate with high fat meals.  She is getting ready to go to her son's wedding.  We discussed Colestid 1 tablet as needed and the benefits of [facility]' colon health probiotics.  Offered a follow-up but patient prefers to follow-up as needed.    I spent 30 minutes caring for Demi on this date of service. This time includes time spent by me in the following activities: preparing for the visit, reviewing tests, obtaining and/or reviewing a separately obtained history, performing a medically appropriate examination and/or evaluation , counseling and educating the patient/family/caregiver, ordering medications, tests, or procedures, documenting information in the medical record, independently interpreting results and communicating that information with the  patient/family/caregiver and care coordination.         CAL Monzon  [facility]  [address] [place] [address]  Office: [phone]

## 2021-12-03 ENCOUNTER — APPOINTMENT (OUTPATIENT)
Dept: WOMENS IMAGING | Facility: HOSPITAL | Age: 56
End: 2021-12-03

## 2021-12-03 PROCEDURE — 77063 BREAST TOMOSYNTHESIS BI: CPT | Performed by: RADIOLOGY

## 2021-12-03 PROCEDURE — 77067 SCR MAMMO BI INCL CAD: CPT | Performed by: RADIOLOGY

## 2022-01-14 ENCOUNTER — OFFICE VISIT (OUTPATIENT)
Dept: FAMILY MEDICINE CLINIC | Age: 57
End: 2022-01-14

## 2022-01-14 VITALS
DIASTOLIC BLOOD PRESSURE: 94 MMHG | BODY MASS INDEX: 23.66 KG/M2 | SYSTOLIC BLOOD PRESSURE: 132 MMHG | WEIGHT: 142 LBS | OXYGEN SATURATION: 100 % | TEMPERATURE: 98.4 F | HEART RATE: 72 BPM | HEIGHT: 65 IN

## 2022-01-14 DIAGNOSIS — Z20.822 EXPOSURE TO COVID-19 VIRUS: ICD-10-CM

## 2022-01-14 DIAGNOSIS — R05.9 COUGH: Primary | ICD-10-CM

## 2022-01-14 PROCEDURE — 99213 OFFICE O/P EST LOW 20 MIN: CPT | Performed by: NURSE PRACTITIONER

## 2022-01-14 PROCEDURE — U0004 COV-19 TEST NON-CDC HGH THRU: HCPCS | Performed by: NURSE PRACTITIONER

## 2022-01-14 RX ORDER — BENZONATATE 200 MG/1
200 CAPSULE ORAL 3 TIMES DAILY PRN
Qty: 30 CAPSULE | Refills: 0 | Status: SHIPPED | OUTPATIENT
Start: 2022-01-14 | End: 2022-12-08

## 2022-01-14 RX ORDER — BROMPHENIRAMINE MALEATE, PSEUDOEPHEDRINE HYDROCHLORIDE, AND DEXTROMETHORPHAN HYDROBROMIDE 2; 30; 10 MG/5ML; MG/5ML; MG/5ML
5 SYRUP ORAL 4 TIMES DAILY PRN
Qty: 240 ML | Refills: 0 | Status: SHIPPED | OUTPATIENT
Start: 2022-01-14 | End: 2022-12-08

## 2022-01-14 NOTE — PROGRESS NOTES
Chief Complaint  Demi Leija presents to [facility] for Headache (onset monday 1/10/22), Nasal Congestion, and Cough    Subjective          Patient here today for concerns of possible covid infection or URI     Known Exposure to positive case?   yes  Date of exposure?   1 week ago  Date of symptoms start?   1/9/2022  (Symptoms may appear 2-14 days after exposure )    Fever or chills?   no  Cough?   yes  Shortness of breath or difficulty breathing?  yes  Fatigue?   yes  Muscle or body aches?  yes   Headache?   yes  New loss of taste or smell? yes  Sore throat?  yes  Congestion or runny nose? yes  Nausea or vomiting?   no  Diarrhea?   no  Any  emergency warning signs for COVID-19.   Trouble breathing?  no  Persistent pain or pressure in the chest?   no  New confusion? no  Inability to wake or stay awake?no  Pale, gray, or blue-colored skin, lips, or nail beds, depending on skin tone?   no    Taking any medications at home to help with symptoms?yes  Dayquil, tylenol  Any prior vaccine to covid?   yes  Any significant health problems / existing lung / heart problems?  no          Review of Systems      No Known Allergies   Past Medical History:   Diagnosis Date   • Acquired absence of other specified parts of digestive tract    • Chronic serous otitis media    • Diarrhea, unspecified    • Headache    • Hypothyroidism    • Pain in joints of unspecified hand    • Post-menopause      Current Outpatient Medications   Medication Sig Dispense Refill   • albuterol sulfate  (90 Base) MCG/ACT inhaler INHALE 1-2 PUFFS BY MOUTH EVERY 4 HOURS AS NEEDED     • ammonium lactate (LAC-HYDRIN) 12 % lotion apply EVERY DAY     • colestipol (COLESTID) 1 g tablet Take 1 tablet by mouth Daily As Needed (Diarrhea). 90 tablet 3   • dicyclomine (Bentyl) 10 MG capsule Take 1 capsule by mouth 3 (Three) Times a Day Before Meals. 18 capsule 0   • ESTRADIOL TONY-TESTOSTER ENAN IM Apply 2 clicks topically  twice daily     • Probiotic Product (ScaleXtreme) capsule Take 1 tablet by mouth Daily. 30 capsule 5   • benzonatate (TESSALON) 200 MG capsule Take 1 capsule by mouth 3 (Three) Times a Day As Needed for Cough. 30 capsule 0   • brompheniramine-pseudoephedrine-DM 30-2-10 MG/5ML syrup Take 5 mL by mouth 4 (Four) Times a Day As Needed for Congestion, Cough or Allergies. 240 mL 0     No current facility-administered medications for this visit.     Past Surgical History:   Procedure Laterality Date   • ABDOMINOPLASTY  2012    mini   • BUNIONECTOMY Bilateral 2010   • CHOLECYSTECTOMY  2021   • RHINOPLASTY  1995   • STOMACH SURGERY     • TONSILLECTOMY     • WISDOM TOOTH EXTRACTION        Social History     Tobacco Use   • Smoking status: Never Smoker   • Smokeless tobacco: Never Used   Substance Use Topics   • Alcohol use: Yes     Alcohol/week: 4.0 standard drinks     Types: 4 Glasses of wine per week   • Drug use: Yes     Types: Marijuana     Comment: 4-5 times a year     Family History   Problem Relation Age of Onset   • Arthritis Mother    • Stroke Mother    • Arthritis Father    • Cancer Father         colon   • Stroke Father    • Colon cancer Father    • Diabetes Father    • Hypertension Sister    • Cancer Brother         prostate   • Stroke Brother         x 2   • Hypertension Brother    • Colon polyps Brother    • SARA disease Brother    • Irritable bowel syndrome Brother    • Hypertension Other      Health Maintenance Due   Topic Date Due   • ZOSTER VACCINE (1 of 2) Never done   • ANNUAL PHYSICAL  01/24/2020   • MAMMOGRAM  10/01/2020   • HEPATITIS C SCREENING  Never done   • INFLUENZA VACCINE  08/01/2021   • PAP SMEAR  10/01/2021   • COVID-19 Vaccine (3 - Booster for Pfizer series) 01/18/2022      Immunization History   Administered Date(s) Administered   • COVID-19 (PFIZER) 06/25/2021, 07/18/2021   • Flu Vaccine Intradermal Quad 18-64YR 10/16/2020   • Flucelvax Quad Vial =>4yrs 01/23/2019   • Influenza TIV  "(IM) 12/04/2012   • Tdap 01/23/2014        Objective     Vitals:    01/14/22 1643   BP: 132/94   BP Location: Left arm   Patient Position: Sitting   Cuff Size: Adult   Pulse: 72   Temp: 98.4 °F (36.9 °C)   TempSrc: Oral   SpO2: 100%   Weight: 64.4 kg (142 lb)   Height: 165.1 cm (65\")     Body mass index is 23.63 kg/m².     Physical Exam  Vitals and nursing note reviewed.   Constitutional:       Appearance: She is ill-appearing.   HENT:      Right Ear: Tympanic membrane and ear canal normal. Drainage and tenderness present. No middle ear effusion. There is no impacted cerumen. Tympanic membrane is not scarred or erythematous.      Left Ear: Tympanic membrane and ear canal normal. Drainage and tenderness present.  No middle ear effusion. There is no impacted cerumen. Tympanic membrane is not scarred or erythematous.      Nose:      Right Sinus: No maxillary sinus tenderness or frontal sinus tenderness.      Left Sinus: No maxillary sinus tenderness or frontal sinus tenderness.      Mouth/Throat:      Pharynx: No pharyngeal swelling, oropharyngeal exudate, posterior oropharyngeal erythema or uvula swelling.      Tonsils: No tonsillar exudate. 0 on the right. 0 on the left.   Cardiovascular:      Rate and Rhythm: Normal rate and regular rhythm.   Pulmonary:      Effort: Pulmonary effort is normal.      Breath sounds: Normal breath sounds. No wheezing or rhonchi.   Lymphadenopathy:      Head:      Right side of head: No submandibular or preauricular adenopathy.      Left side of head: No submandibular or preauricular adenopathy.   Skin:     General: Skin is warm and dry.   Neurological:      Mental Status: She is alert and oriented to person, place, and time.   Psychiatric:         Attention and Perception: Attention and perception normal.         Behavior: Behavior normal.           Result Review :                               Assessment and Plan      Diagnoses and all orders for this visit:    1. Cough " (Primary)  Comments:  Possible bronchitis.  Recommend symptomatic treatment prescriptions to pharmacy as written follow-up if new or worsening symptoms  Orders:  -     COVID-19,APTIMA PANTHER([place]),[facility], NP/OP SWAB IN UTM/VTM/SALINE TRANSPORT MEDIA,24 HR TAT - Swab, Nasopharynx; Future  -     COVID-19,APTIMA PANTHER([place]),[facility], NP/OP SWAB IN UTM/VTM/SALINE TRANSPORT MEDIA,24 HR TAT - Swab, Nasopharynx  -     brompheniramine-pseudoephedrine-DM 30-2-10 MG/5ML syrup; Take 5 mL by mouth 4 (Four) Times a Day As Needed for Congestion, Cough or Allergies.  Dispense: 240 mL; Refill: 0  -     benzonatate (TESSALON) 200 MG capsule; Take 1 capsule by mouth 3 (Three) Times a Day As Needed for Cough.  Dispense: 30 capsule; Refill: 0    2. Exposure to COVID-19 virus  Comments:  Quarantine instructions given.  She has been advised to isolate until determination of the result.  Orders:  -     COVID-19,APTIMA PANTHER([place]),[facility], NP/OP SWAB IN UTM/VTM/SALINE TRANSPORT MEDIA,24 HR TAT - Swab, Nasopharynx; Future  -     COVID-19,APTIMA PANTHER([place]),[facility], NP/OP SWAB IN UTM/VTM/SALINE TRANSPORT MEDIA,24 HR TAT - Swab, Nasopharynx              Follow Up     Return if symptoms worsen or fail to improve.

## 2022-01-17 LAB — SARS-COV-2 RNA PNL SPEC NAA+PROBE: DETECTED

## 2022-08-10 ENCOUNTER — TELEPHONE (OUTPATIENT)
Dept: FAMILY MEDICINE CLINIC | Age: 57
End: 2022-08-10

## 2022-08-10 DIAGNOSIS — M54.16 LUMBAR RADICULOPATHY: ICD-10-CM

## 2022-08-10 DIAGNOSIS — G89.29 CHRONIC LOW BACK PAIN, UNSPECIFIED BACK PAIN LATERALITY, UNSPECIFIED WHETHER SCIATICA PRESENT: Primary | ICD-10-CM
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
## 2022-08-10 DIAGNOSIS — M54.50 CHRONIC LOW BACK PAIN, UNSPECIFIED BACK PAIN LATERALITY, UNSPECIFIED WHETHER SCIATICA PRESENT: Primary | ICD-10-CM

## 2022-08-10 NOTE — TELEPHONE ENCOUNTER
Caller: Demi Leija    Relationship to patient: Self    Best call back number: [phone] (H)    Patient is needing: PATIENT SCHEDULED AN APPOINTMENT FOR 08/23/22 TO DISCUSS HER CONTINUED BACK PAIN AND GETTING A POSSIBLE REFERRAL FOR PAIN MANAGEMENT.   PATIENT IS NOW STAYING IN [place] AND WANTS TO MAKE SURE THAT [facility]ER WOULD BE ABLE TO REFER HER TO SOMEONE IN PAIN MANAGEMENT IN [place]. IF NOT SHE MAY NOT NEED THE APPOINTMENT ON 08/23/22.    PLEASE CALL AND ADVISE.

## 2022-11-11 ENCOUNTER — TELEPHONE (OUTPATIENT)
Dept: FAMILY MEDICINE CLINIC | Age: 57
End: 2022-11-11

## 2022-11-11 NOTE — TELEPHONE ENCOUNTER
Pt will need an appt last seen in [facility] last prescribed 2020. lmom inf pt she needs an appt to assess

## 2022-11-30 ENCOUNTER — TELEPHONE (OUTPATIENT)
Dept: GASTROENTEROLOGY | Facility: CLINIC | Age: 57
End: 2022-11-30

## 2022-11-30 NOTE — TELEPHONE ENCOUNTER
Agree needs an in office evaluation before we can talk about treatment.  Has she ever use Bentyl or Levsin antispasmodic to treat cramping?

## 2022-11-30 NOTE — TELEPHONE ENCOUNTER
Called pt, she states she is having a lot of gas and cramping, occasional diarrhea. Pt has a hx of colitis. Pt is asking if there is something that can be prescribed to help with the cramping.      Advised pt she has not been seen in the office in > one year.  Made appt with FELICIA Bishop 12/8 @10:15am.   Msg sent to FELICIA Bishop.

## 2022-11-30 NOTE — TELEPHONE ENCOUNTER
Hub staff attempted to follow warm transfer process and was unsuccessful     Caller: Demi Leija    Relationship to patient: Self    Best call back number: [phone]    Patient is needing: PT EXPERIENCING SEVERE PAINS/GAS. WOULD LIKE A CALL BACK WITH ANY SUGGESTIONS WITH SOMETHING STRONGER THAN OTC DRUGS TO ALLEVIATE SYMPTOMS.

## 2022-12-01 NOTE — TELEPHONE ENCOUNTER
Pt has never tried either of these medication and Is agreeable to trying and would like the rx to be sent to her [facility] [place].

## 2022-12-08 ENCOUNTER — OFFICE VISIT (OUTPATIENT)
Dept: GASTROENTEROLOGY | Facility: CLINIC | Age: 57
End: 2022-12-08

## 2022-12-08 VITALS — TEMPERATURE: 96.9 F | WEIGHT: 143 LBS | BODY MASS INDEX: 22.98 KG/M2 | HEIGHT: 66 IN

## 2022-12-08 DIAGNOSIS — R19.7 DIARRHEA, UNSPECIFIED TYPE: Primary | ICD-10-CM

## 2022-12-08 DIAGNOSIS — R10.84 GENERALIZED ABDOMINAL PAIN: ICD-10-CM

## 2022-12-08 PROCEDURE — 99214 OFFICE O/P EST MOD 30 MIN: CPT | Performed by: NURSE PRACTITIONER

## 2022-12-08 NOTE — PROGRESS NOTES
Chief Complaint   Patient presents with   • Abdominal Pain       HPI    Demi Leija is a  57 y.o. female here with a history of lap sascha for a follow up visit for abdominal pain.    This patient follows with Dr. William and myself.    She has a history of collagenous colitis following colonoscopy November 2020.  Treated with Entocort.    Currently she reports several weeks of generalized abdominal pain can localize to the lower abdominal region described as an aching sensation that comes and goes.  Intermittent diarrhea with fecal urgency and significant gas and bloating.  Gas can be painful at times.  She is using Pepto-Bismol and recently started as needed Levsin with minimal improvement.  Denies rectal bleeding or rectal pain.  Her appetite is good.  Her weight is stable.    Past Medical History:   Diagnosis Date   • Acquired absence of other specified parts of digestive tract    • Chronic serous otitis media    • Diarrhea, unspecified    • Headache    • Hypothyroidism    • Pain in joints of unspecified hand    • Post-menopause        Past Surgical History:   Procedure Laterality Date   • ABDOMINOPLASTY  2012    mini   • BUNIONECTOMY Bilateral 2010   • CHOLECYSTECTOMY  2021   • COLONOSCOPY  10/2021   • HEMORRHOIDECTOMY  2010   • RHINOPLASTY  1995   • STOMACH SURGERY     • TONSILLECTOMY     • WISDOM TOOTH EXTRACTION         Scheduled Meds:     Continuous Infusions: No current facility-administered medications for this visit.      PRN Meds:     No Known Allergies    Social History     Socioeconomic History   • Marital status: Single   • Number of children: 2   • Years of education: HIGH SCHOOL   Tobacco Use   • Smoking status: Never   • Smokeless tobacco: Never   Substance and Sexual Activity   • Alcohol use: Yes     Alcohol/week: 6.0 standard drinks     Types: 4 Glasses of wine, 2 Drinks containing 0.5 oz of alcohol per week   • Drug use: Yes     Types: Marijuana     Comment: 4-5 times a year   • Sexual  activity: Yes     Partners: Male     Birth control/protection: None     Comment: Partner had a vasectomy       Family History   Problem Relation Age of Onset   • Arthritis Mother    • Stroke Mother    • Arthritis Father    • Cancer Father         colon   • Stroke Father    • Colon cancer Father    • Diabetes Father    • Hypertension Sister    • Cancer Brother         prostate   • Stroke Brother         x 2   • Hypertension Brother    • Colon polyps Brother    • SARA disease Brother    • Irritable bowel syndrome Brother         Brothers   • Hypertension Other        Review of Systems   Constitutional: Negative for activity change, appetite change, fatigue, fever and unexpected weight change.   HENT: Negative for trouble swallowing.    Respiratory: Negative for apnea, cough, choking, chest tightness, shortness of breath and wheezing.    Cardiovascular: Negative for chest pain, palpitations and leg swelling.   Gastrointestinal: Positive for abdominal pain and diarrhea. Negative for abdominal distention, anal bleeding, blood in stool, constipation, nausea, rectal pain and vomiting.       Vitals:    12/08/22 1043   Temp: 96.9 °F (36.1 °C)       Physical Exam  Constitutional:       Appearance: She is well-developed.   Abdominal:      General: Bowel sounds are normal. There is no distension.      Palpations: Abdomen is soft. There is no mass.      Tenderness: There is abdominal tenderness. There is no guarding.      Hernia: No hernia is present.          Comments: Tenderness noted in the area marked above   Skin:     General: Skin is warm and dry.      Capillary Refill: Capillary refill takes less than 2 seconds.   Neurological:      Mental Status: She is alert and oriented to person, place, and time.   Psychiatric:         Behavior: Behavior normal.     Assessment    Diagnoses and all orders for this visit:    1. Diarrhea, unspecified type (Primary)  -     CBC & Differential  -     Comprehensive Metabolic Panel  -      C-reactive Protein  -     Sedimentation Rate  -     Celiac Comprehensive Panel  -     Food Allergy Profile  -     TSH  -     Gastrointestinal Panel, PCR - Stool, Per Rectum  -     Clostridioides difficile Toxin, PCR - Stool, Per Rectum  -     Calprotectin, Fecal - Stool, Per Rectum  -     Pancreatic Elastase, Fecal - Stool, Per Rectum  -     CT Abdomen Pelvis With Contrast; Future    2. Generalized abdominal pain  -     CBC & Differential  -     Comprehensive Metabolic Panel  -     C-reactive Protein  -     Sedimentation Rate  -     Celiac Comprehensive Panel  -     Food Allergy Profile  -     TSH  -     CT Abdomen Pelvis With Contrast; Future       Plan    Very pleasant 57-year-old female seen today for generalized abdominal pain that can localize to the lower abdominal region with complaints of gas, bloat, and diarrhea.  She has a history of collagenous colitis as well.  At this point recommend stool testing to rule out an infectious process and assess for colonic inflammation as well as EPI  Labs today as above  She is very tender in the lower abdominal area on physical exam recommend CT of the abdomen and pelvis for further evaluation  Continue antispasmodics and Pepto-Bismol  Pending the aforementioned work-up consider resumption of Entocort         CAL Monzon  [facility] [place]  [address] [place] [address]  Office: [phone]

## 2022-12-09 ENCOUNTER — TELEPHONE (OUTPATIENT)
Dept: GASTROENTEROLOGY | Facility: CLINIC | Age: 57
End: 2022-12-09

## 2022-12-09 LAB
ALBUMIN SERPL-MCNC: 4.7 G/DL (ref 3.5–5.2)
ALBUMIN/GLOB SERPL: 2.2 G/DL
ALP SERPL-CCNC: 53 U/L (ref 39–117)
ALT SERPL-CCNC: 15 U/L (ref 1–33)
AST SERPL-CCNC: 17 U/L (ref 1–32)
BASOPHILS # BLD AUTO: 0.07 10*3/MM3 (ref 0–0.2)
BASOPHILS NFR BLD AUTO: 0.8 % (ref 0–1.5)
BILIRUB SERPL-MCNC: 1.2 MG/DL (ref 0–1.2)
BUN SERPL-MCNC: 18 MG/DL (ref 6–20)
BUN/CREAT SERPL: 17.8 (ref 7–25)
C DIFF TOX GENS STL QL NAA+PROBE: NEGATIVE
CALCIUM SERPL-MCNC: 10 MG/DL (ref 8.6–10.5)
CHLORIDE SERPL-SCNC: 100 MMOL/L (ref 98–107)
CO2 SERPL-SCNC: 27 MMOL/L (ref 22–29)
CREAT SERPL-MCNC: 1.01 MG/DL (ref 0.57–1)
CRP SERPL-MCNC: 0.43 MG/DL (ref 0–0.5)
EGFRCR SERPLBLD CKD-EPI 2021: 65.1 ML/MIN/1.73
ENDOMYSIUM IGA SER QL: NEGATIVE
EOSINOPHIL # BLD AUTO: 0.1 10*3/MM3 (ref 0–0.4)
EOSINOPHIL NFR BLD AUTO: 1.1 % (ref 0.3–6.2)
ERYTHROCYTE [DISTWIDTH] IN BLOOD BY AUTOMATED COUNT: 12 % (ref 12.3–15.4)
ERYTHROCYTE [SEDIMENTATION RATE] IN BLOOD BY WESTERGREN METHOD: 14 MM/HR (ref 0–30)
GLIADIN PEPTIDE IGA SER-ACNC: 3 UNITS (ref 0–19)
GLIADIN PEPTIDE IGG SER-ACNC: 2 UNITS (ref 0–19)
GLOBULIN SER CALC-MCNC: 2.1 GM/DL
GLUCOSE SERPL-MCNC: 88 MG/DL (ref 65–99)
HCT VFR BLD AUTO: 43.5 % (ref 34–46.6)
HGB BLD-MCNC: 14.6 G/DL (ref 12–15.9)
IGA SERPL-MCNC: 46 MG/DL (ref 87–352)
IMM GRANULOCYTES # BLD AUTO: 0.07 10*3/MM3 (ref 0–0.05)
IMM GRANULOCYTES NFR BLD AUTO: 0.8 % (ref 0–0.5)
LYMPHOCYTES # BLD AUTO: 2.62 10*3/MM3 (ref 0.7–3.1)
LYMPHOCYTES NFR BLD AUTO: 29.7 % (ref 19.6–45.3)
MCH RBC QN AUTO: 30.9 PG (ref 26.6–33)
MCHC RBC AUTO-ENTMCNC: 33.6 G/DL (ref 31.5–35.7)
MCV RBC AUTO: 92 FL (ref 79–97)
MONOCYTES # BLD AUTO: 0.81 10*3/MM3 (ref 0.1–0.9)
MONOCYTES NFR BLD AUTO: 9.2 % (ref 5–12)
NEUTROPHILS # BLD AUTO: 5.16 10*3/MM3 (ref 1.7–7)
NEUTROPHILS NFR BLD AUTO: 58.4 % (ref 42.7–76)
NRBC BLD AUTO-RTO: 0 /100 WBC (ref 0–0.2)
PLATELET # BLD AUTO: 329 10*3/MM3 (ref 140–450)
POTASSIUM SERPL-SCNC: 4.9 MMOL/L (ref 3.5–5.2)
PROT SERPL-MCNC: 6.8 G/DL (ref 6–8.5)
RBC # BLD AUTO: 4.73 10*6/MM3 (ref 3.77–5.28)
SODIUM SERPL-SCNC: 137 MMOL/L (ref 136–145)
TSH SERPL DL<=0.005 MIU/L-ACNC: 2.43 UIU/ML (ref 0.27–4.2)
TTG IGA SER-ACNC: <2 U/ML (ref 0–3)
TTG IGG SER-ACNC: <2 U/ML (ref 0–5)
WBC # BLD AUTO: 8.83 10*3/MM3 (ref 3.4–10.8)

## 2022-12-09 NOTE — TELEPHONE ENCOUNTER
Caller: Demi Leija    Relationship: Self    Best call back number: [phone]    What is the best time to reach you: ANYTIME    Who are you requesting to speak with (clinical staff, provider,  specific staff member): CLINICAL STAFF    Do you know the name of the person who called: HOMERO MOORE RN    What was the call regarding: RETURNING MISSED CALL    Do you require a callback: YES, PLEASE

## 2022-12-09 NOTE — TELEPHONE ENCOUNTER
Caller: Demi Leija    Relationship to patient: Self    Best call back number: [phone]    Patient is needing: PT CALLED TO UPDATE JIMMY ON HOW SHE IS AFTER THE APPT. PT SAID THAT SHE IS NOT IMPROVING AND MAY EVEN FEEL WORSE

## 2022-12-09 NOTE — TELEPHONE ENCOUNTER
Returned patient's phone call. She questions if all of her lab work had returned? Advised not all of her lab work has resulted.   She states she feels about the same as yesterday and perhaps a little worse.   Advised will send an update to Edna. She verb understanding.

## 2022-12-12 LAB
ADV 40+41 DNA STL QL NAA+NON-PROBE: NOT DETECTED
ASTRO TYP 1-8 RNA STL QL NAA+NON-PROBE: NOT DETECTED
C CAYETANENSIS DNA STL QL NAA+NON-PROBE: NOT DETECTED
C COLI+JEJ+UPSA DNA STL QL NAA+NON-PROBE: NOT DETECTED
C DIF TOX TCDA+TCDB STL QL NAA+NON-PROBE: NOT DETECTED
CLAM IGE QN: <0.1 KU/L
CODFISH IGE QN: <0.1 KU/L
CONV CLASS DESCRIPTION: NORMAL
CORN IGE QN: <0.1 KU/L
COW MILK IGE QN: <0.1 KU/L
CRYPTOSP DNA STL QL NAA+NON-PROBE: NOT DETECTED
E COLI O157 DNA STL QL NAA+NON-PROBE: NORMAL
E HISTOLYT DNA STL QL NAA+NON-PROBE: NOT DETECTED
EAEC PAA PLAS AGGR+AATA ST NAA+NON-PRB: NOT DETECTED
EC STX1+STX2 GENES STL QL NAA+NON-PROBE: NOT DETECTED
EGG WHITE IGE QN: <0.1 KU/L
ELASTASE PANC STL-MCNT: 293 UG ELAST./G
EPEC EAE GENE STL QL NAA+NON-PROBE: NOT DETECTED
ETEC LTA+ST1A+ST1B TOX ST NAA+NON-PROBE: NOT DETECTED
G LAMBLIA DNA STL QL NAA+NON-PROBE: NOT DETECTED
NOROVIRUS GI+II RNA STL QL NAA+NON-PROBE: NOT DETECTED
P SHIGELLOIDES DNA STL QL NAA+NON-PROBE: NOT DETECTED
PEANUT IGE QN: <0.1 KU/L
RVA RNA STL QL NAA+NON-PROBE: NOT DETECTED
S ENT+BONG DNA STL QL NAA+NON-PROBE: NOT DETECTED
SAPO I+II+IV+V RNA STL QL NAA+NON-PROBE: NOT DETECTED
SCALLOP IGE QN: <0.1 KU/L
SESAME SEED IGE QN: <0.1 KU/L
SHIGELLA SP+EIEC IPAH ST NAA+NON-PROBE: NOT DETECTED
SHRIMP IGE QN: <0.1 KU/L
SOYBEAN IGE QN: <0.1 KU/L
V CHOL+PARA+VUL DNA STL QL NAA+NON-PROBE: NOT DETECTED
V CHOLERAE DNA STL QL NAA+NON-PROBE: NOT DETECTED
WALNUT IGE QN: <0.1 KU/L
WHEAT IGE QN: <0.1 KU/L
Y ENTEROCOL DNA STL QL NAA+NON-PROBE: NOT DETECTED

## 2022-12-13 LAB — CALPROTECTIN STL-MCNT: <16 UG/G (ref 0–120)

## 2022-12-13 NOTE — PROGRESS NOTES
Please inform Demi her stool test shows no signs of inflammation, no evidence of food allergies, no infection, no issues with producing digestive enzymes.  Await CT findings.

## 2022-12-14 ENCOUNTER — TELEPHONE (OUTPATIENT)
Dept: GASTROENTEROLOGY | Facility: CLINIC | Age: 57
End: 2022-12-14

## 2022-12-14 NOTE — TELEPHONE ENCOUNTER
Called pt and advised of Edna DUARTE's note.  Pt verbalized understanding.    Pt asked CT order be faxed to [facility], faxed to [phone], confirmation rec'd.

## 2022-12-14 NOTE — TELEPHONE ENCOUNTER
----- Message from CAL Monzon sent at 12/13/2022  3:40 PM EST -----  Please inform Demi her stool test shows no signs of inflammation, no evidence of food allergies, no infection, no issues with producing digestive enzymes.  Await CT findings.

## 2022-12-15 ENCOUNTER — TELEPHONE (OUTPATIENT)
Dept: GASTROENTEROLOGY | Facility: CLINIC | Age: 57
End: 2022-12-15

## 2022-12-15 NOTE — TELEPHONE ENCOUNTER
DELETE AFTER REVIEWING: Telephone encounter to be sent to the clinical pool     Caller: Demi Leija    Relationship: Self    Best call back number: [phone]    What test/procedure requested: CT SCAN    When is it needed: AS SOON AS POISSIBLE    Where is the test/procedure going to be performed: [facility]    Additional information or concerns: PT IS HAVING THE CT SCAN DONE ON 12/28/22 AND NEED TO HAVE THE INSURANCE AUTHORIZATION APPROVAL BEFORE THIS CAN BE DONE.    PLEASE CALL PATIENT AND ADVISE

## 2022-12-27 NOTE — TELEPHONE ENCOUNTER
Hub staff attempted to follow warm transfer process and was unsuccessful     Caller: CHRISTIAN     Relationship to patient: [facility]     Best call back number: [phone]    Patient is needing:  PT IS HAVING THE CT SCAN DONE ON 12/28/22 AND NEEDS TO HAVE THE INSURANCE AUTHORIZATION APPROVAL.  SPOKE WITH CHRISTIAN FROM [facility] AND NEEDS SOMEONE TO CONFIRM THE AUTHORIZATION AS QUICKLY AS POSSIBLE AS THE CT IS SCHEDULED FOR TOMORROW.  THANK YOU

## 2023-01-03 ENCOUNTER — TELEPHONE (OUTPATIENT)
Dept: GASTROENTEROLOGY | Facility: CLINIC | Age: 58
End: 2023-01-03
Payer: MEDICAID

## 2023-01-03 NOTE — TELEPHONE ENCOUNTER
Returned patient's phone call. She states she is having multiple watery stools through out the day. She states she is having cramping along with the diarrhea.     Denies any fever. States she cannot get her CT scan for two more weeks.     Patient reports she is taking Pepto and Imodium and neither have helped.     She states she has a h/o of \"collagenous colitis\" and has been on a steroid to help treat. It helped.     Advised an update will be sent to Edna. She verb understanding.

## 2023-01-03 NOTE — TELEPHONE ENCOUNTER
Hub staff attempted to follow warm transfer process and was unsuccessful     Caller: Demi Leija.    Relationship to patient: Self    Best call back number:[phone]     Patient is needing: TO SPEAK TO SOMEONE ASAP REGARDING SOME UNCONTROLLABLE ISSUES. PATIENT CANNOT GET A CT SCAN FOR 2 MORE WEEKS.

## 2023-01-04 RX ORDER — BUDESONIDE 3 MG/1
CAPSULE, COATED PELLETS ORAL
Qty: 154 CAPSULE | Refills: 2 | Status: SHIPPED | OUTPATIENT
Start: 2023-01-04 | End: 2023-04-04

## 2023-01-04 NOTE — TELEPHONE ENCOUNTER
I called her in some Entocort to help with the diarrhea.  This is a gut specific steroid that she was on years ago for the colitis.  Await CT findings.  Make sure she has a 4-week follow-up.

## 2023-01-04 NOTE — TELEPHONE ENCOUNTER
Patient called. Advised as per Edna's note. She verb understanding.   F/u visit scheduled for 01/25@1030 with Edna.

## 2023-01-25 ENCOUNTER — OFFICE VISIT (OUTPATIENT)
Dept: GASTROENTEROLOGY | Facility: CLINIC | Age: 58
End: 2023-01-25
Payer: MEDICAID

## 2023-01-25 VITALS
TEMPERATURE: 95.2 F | HEIGHT: 66 IN | SYSTOLIC BLOOD PRESSURE: 128 MMHG | HEART RATE: 73 BPM | DIASTOLIC BLOOD PRESSURE: 86 MMHG | WEIGHT: 137.4 LBS | BODY MASS INDEX: 22.08 KG/M2

## 2023-01-25 DIAGNOSIS — R14.0 BLOATING SYMPTOM: ICD-10-CM

## 2023-01-25 DIAGNOSIS — K52.831 COLITIS, COLLAGENOUS: Primary | ICD-10-CM

## 2023-01-25 PROCEDURE — 99214 OFFICE O/P EST MOD 30 MIN: CPT | Performed by: NURSE PRACTITIONER

## 2023-01-25 RX ORDER — PROGESTERONE 200 MG/1
200 CAPSULE ORAL DAILY
COMMUNITY
Start: 2023-01-23

## 2023-01-25 NOTE — PROGRESS NOTES
Chief Complaint   Patient presents with   • Colitis, collagenous       HPI    Demi Leija is a  57 y.o. female here for a follow up visit for collagenous colitis.    Patient follows with Dr. William and myself.    She was diagnosed with collagenous colitis in November 2020.    She returned to our care in December for complaints of generalized abdominal pain and diarrhea.  CT showed evidence of fatty liver but nothing to explain her symptoms.  Stool testing was negative for infection and she was placed back on Entocort.    Today she reports resolution of diarrhea shortly after resuming Entocort.  She has been on gut specific steroid now for a week and a half.  Still struggle with gas and bloating however.  Occasionally gas can be painful.  No rectal pain or rectal bleeding.  She has not been tested for SIBO.    No upper GI complaints.    Past Medical History:   Diagnosis Date   • Acquired absence of other specified parts of digestive tract    • Chronic serous otitis media    • Diarrhea, unspecified    • Headache    • Hypothyroidism    • Pain in joints of unspecified hand    • Post-menopause        Past Surgical History:   Procedure Laterality Date   • ABDOMINOPLASTY  2012    mini   • BUNIONECTOMY Bilateral 2010   • CHOLECYSTECTOMY  2021   • COLONOSCOPY  10/2021   • HEMORRHOIDECTOMY  2010   • RHINOPLASTY  1995   • STOMACH SURGERY     • TONSILLECTOMY     • WISDOM TOOTH EXTRACTION         Scheduled Meds:     Continuous Infusions: No current facility-administered medications for this visit.      PRN Meds:     No Known Allergies    Social History     Socioeconomic History   • Marital status: Single   • Number of children: 2   • Years of education: HIGH SCHOOL   Tobacco Use   • Smoking status: Never   • Smokeless tobacco: Never   Substance and Sexual Activity   • Alcohol use: Yes     Alcohol/week: 6.0 standard drinks     Types: 4 Glasses of wine, 2 Drinks containing 0.5 oz of alcohol per week   • Drug use: Yes      Types: Marijuana     Comment: 4-5 times a year   • Sexual activity: Yes     Partners: Male     Birth control/protection: None     Comment: Partner had a vasectomy       Family History   Problem Relation Age of Onset   • Arthritis Mother    • Stroke Mother    • Arthritis Father    • Cancer Father         colon   • Stroke Father    • Colon cancer Father    • Diabetes Father    • Hypertension Sister    • Cancer Brother         prostate   • Stroke Brother         x 2   • Hypertension Brother    • Colon polyps Brother    • SARA disease Brother    • Irritable bowel syndrome Brother         Brothers   • Hypertension Other        Review of Systems   Constitutional: Negative for activity change, appetite change, fatigue, fever and unexpected weight change.   HENT: Negative for trouble swallowing.    Respiratory: Negative for apnea, cough, choking, chest tightness, shortness of breath and wheezing.    Cardiovascular: Negative for chest pain, palpitations and leg swelling.   Gastrointestinal: Negative for abdominal distention, abdominal pain, anal bleeding, blood in stool, constipation, diarrhea, nausea, rectal pain and vomiting.        + bloating and gas       Vitals:    01/25/23 1041   BP: 128/86   Pulse: 73   Temp: 95.2 °F (35.1 °C)       Physical Exam  Constitutional:       Appearance: She is well-developed.   Abdominal:      General: Bowel sounds are normal. There is no distension.      Palpations: Abdomen is soft. There is no mass.      Tenderness: There is no abdominal tenderness. There is no guarding.      Hernia: No hernia is present.   Skin:     General: Skin is warm and dry.      Capillary Refill: Capillary refill takes less than 2 seconds.   Neurological:      Mental Status: She is alert and oriented to person, place, and time.   Psychiatric:         Behavior: Behavior normal.       Assessment    Diagnoses and all orders for this visit:    1. Colitis, collagenous (Primary)    2. Bloating symptom        Plan    Patient diarrhea has resolved shortly after resumption of Entocort  Discussed tapering medication over the next 3 months and all questions were answered  Given residual gas and bloat for thoroughness sake recommend SIBO breath testing if positive treat with Xifaxan  Follow-up and further recommendations pending breath testing and patient's clinical course during taper of Entocort         CAL Monzon  [facility]  [address] [place] [address]  Office: [phone]    I spent 30 minutes caring for Demi on this date of service. This time includes time spent by me in the following activities: preparing for the visit, reviewing tests, obtaining and/or reviewing a separately obtained history, performing a medically appropriate examination and/or evaluation , counseling and educating the patient/family/caregiver, ordering medications, tests, or procedures, documenting information in the medical record, independently interpreting results and communicating that information with the patient/family/caregiver and care coordination.

## 2023-01-26 ENCOUNTER — TELEPHONE (OUTPATIENT)
Dept: GASTROENTEROLOGY | Facility: CLINIC | Age: 58
End: 2023-01-26
Payer: MEDICAID

## 2023-01-26 NOTE — TELEPHONE ENCOUNTER
----- Message from CAL Monzon sent at 1/25/2023 11:20 AM EST -----  Please mail a SIBO breath test kit to her home

## 2023-02-15 ENCOUNTER — TELEPHONE (OUTPATIENT)
Dept: GASTROENTEROLOGY | Facility: CLINIC | Age: 58
End: 2023-02-15
Payer: MEDICAID

## 2023-02-15 NOTE — TELEPHONE ENCOUNTER
----- Message from CAL Monzon sent at 1/18/2023 10:08 AM EST -----  Please inform Demi that her CAT scan shows fatty liver but nothing to explain her abdominal pain and diarrhea.  Stool testing is negative for infection.     See how she would feel about going back on Entocort as symptoms are likely secondary to flareup of collagenous colitis.

## 2023-02-24 ENCOUNTER — TELEPHONE (OUTPATIENT)
Dept: GASTROENTEROLOGY | Facility: CLINIC | Age: 58
End: 2023-02-24

## 2023-02-24 NOTE — TELEPHONE ENCOUNTER
Caller: Demi Leija    Relationship: Self    Best call back number: [phone]     What is the best time to reach you: ANYTIME     Who are you requesting to speak with (clinical staff, provider,  specific staff member): CLINICAL STAFF    What was the call regarding: PATIENT STATED SHE MET WITH DR. TOPETE ON 2/23/23 REGARDING HER BACK PAIN. PATIENT STATES THIS DOCTOR SUGGESTED SHE TAKE AN OVER THE COUNTER PAIN MEDICATION, SUCH AS NAPROXEN. PATIENT WAS REQUESTING A CALL BACK TO SPEAK WITH CLINICAL STAFF ABOUT WHAT PAIN MEDICATION SHE SHOULD TAKE.     Do you require a callback: YES

## 2023-03-20 ENCOUNTER — OFFICE VISIT (OUTPATIENT)
Dept: OBSTETRICS AND GYNECOLOGY | Facility: CLINIC | Age: 58
End: 2023-03-20
Payer: MEDICAID

## 2023-03-20 VITALS — WEIGHT: 139.6 LBS | BODY MASS INDEX: 22.53 KG/M2

## 2023-03-20 DIAGNOSIS — Z53.21 PATIENT LEFT WITHOUT BEING SEEN: Primary | ICD-10-CM

## 2023-03-20 NOTE — PROGRESS NOTES
"Chief Complaint  No chief complaint on file.    Subjective        Demi Leija presents to [facility]  History of Present Illness    Objective   Vital Signs:  There were no vitals taken for this visit.  Estimated body mass index is 22.18 kg/m² as calculated from the following:    Height as of 1/25/23: 167.6 cm (66\").    Weight as of 1/25/23: 62.3 kg (137 lb 6.4 oz).       BMI is within normal parameters. No other follow-up for BMI required.      Physical Exam   Result Review :      Data reviewed: Radiologic studies CT     CT (12/2022):  Uterus and adnexa are within normal limits for patient age           Assessment and Plan   There are no diagnoses linked to this encounter.         Follow Up   No follow-ups on file.  Patient was given instructions and counseling regarding her condition or for health maintenance advice. Please see specific information pulled into the AVS if appropriate.       "

## 2023-03-21 NOTE — PROGRESS NOTES
Patient made an appointment to be seen for hormone replacement therapy.  Patient currently is taking compounded estrogen, testosterone and progesterone.  I explained to the patient that I do not provide testosterone to female patients because of cardiovascular risk.  Patient opted to cancel her appointment at that time.  No evaluation was performed.

## 2023-03-23 ENCOUNTER — TELEPHONE (OUTPATIENT)
Dept: GASTROENTEROLOGY | Facility: CLINIC | Age: 58
End: 2023-03-23

## 2023-03-23 RX ORDER — ESOMEPRAZOLE MAGNESIUM 40 MG/1
40 CAPSULE, DELAYED RELEASE ORAL DAILY
Qty: 90 CAPSULE | Refills: 3 | Status: SHIPPED | OUTPATIENT
Start: 2023-03-23

## 2023-03-23 NOTE — TELEPHONE ENCOUNTER
I sent Nexium to her pharmacy.  Strict antireflux dietary precautions.  Lets have her come in Monday afternoon and see me.

## 2023-03-23 NOTE — TELEPHONE ENCOUNTER
Provider: JIMMY GIBBONS    Caller: KARENA RICHARDSON    Relationship to Patient: SELF  Pharmacy: [facility] [id number] - [place] [address] AT [facility] [address] - [phone]  - [phone]   [phone]    Phone Number: [phone]  Reason for Call: PATIENT IS SUFFERING WITH EXTREME ACID REFLUX. SHE STATED SHE IS HAVING TO SLEEP SITTING UP IT'S SO BAD. SHE IS WANTING TO SEE IF JIMMY CAN CALL HER IN A SCRIPT FOR THIS.

## 2023-03-27 ENCOUNTER — OFFICE VISIT (OUTPATIENT)
Dept: GASTROENTEROLOGY | Facility: CLINIC | Age: 58
End: 2023-03-27
Payer: MEDICAID

## 2023-03-27 VITALS
SYSTOLIC BLOOD PRESSURE: 148 MMHG | TEMPERATURE: 96.9 F | HEART RATE: 74 BPM | DIASTOLIC BLOOD PRESSURE: 96 MMHG | HEIGHT: 66 IN | BODY MASS INDEX: 22.6 KG/M2 | WEIGHT: 140.6 LBS

## 2023-03-27 DIAGNOSIS — K64.9 HEMORRHOIDS, UNSPECIFIED HEMORRHOID TYPE: ICD-10-CM

## 2023-03-27 DIAGNOSIS — K21.9 GASTROESOPHAGEAL REFLUX DISEASE, UNSPECIFIED WHETHER ESOPHAGITIS PRESENT: Primary | ICD-10-CM

## 2023-03-27 DIAGNOSIS — K52.831 COLITIS, COLLAGENOUS: ICD-10-CM

## 2023-03-27 PROCEDURE — 99214 OFFICE O/P EST MOD 30 MIN: CPT | Performed by: NURSE PRACTITIONER

## 2023-03-27 PROCEDURE — 1160F RVW MEDS BY RX/DR IN RCRD: CPT | Performed by: NURSE PRACTITIONER

## 2023-03-27 PROCEDURE — 1159F MED LIST DOCD IN RCRD: CPT | Performed by: NURSE PRACTITIONER

## 2023-03-27 NOTE — PROGRESS NOTES
Chief Complaint   Patient presents with   • Heartburn       HPI    Demi Leija is a  57 y.o. female here for a follow up visit for GERD and collagenous colitis.    This patient follows with Dr. William and myself.    Patient reports intermittent heartburn for at least the last 3 months worse over the last several weeks.  We called her and Nexium 40 mg once daily and she is already feeling better.  She is adhering to an antireflux diet.  She still has gas and bloat has a SIBO breath test kit at home that she plans on completing soon.  She is also tapering Entocort down to 2 tablets a day.  She is having formed bowel movements without difficulty.  She is concerned about hemorrhoids and would like to see hemorrhoid specialist discussed treatment options.    She is due for screening colonoscopy in 2025    Past Medical History:   Diagnosis Date   • Acquired absence of other specified parts of digestive tract    • Chronic serous otitis media    • Diarrhea, unspecified    • Headache    • Hypothyroidism    • Pain in joints of unspecified hand    • Post-menopause        Past Surgical History:   Procedure Laterality Date   • ABDOMINOPLASTY  2012    mini   • BUNIONECTOMY Bilateral 2010   • CHOLECYSTECTOMY  2021   • COLONOSCOPY  10/2021   • HEMORRHOIDECTOMY  2010   • RHINOPLASTY  1995   • STOMACH SURGERY     • TONSILLECTOMY     • WISDOM TOOTH EXTRACTION         Scheduled Meds:     Continuous Infusions: No current facility-administered medications for this visit.      PRN Meds:     No Known Allergies    Social History     Socioeconomic History   • Marital status: Single   • Number of children: 2   • Years of education: HIGH SCHOOL   Tobacco Use   • Smoking status: Never   • Smokeless tobacco: Never   Substance and Sexual Activity   • Alcohol use: Yes     Alcohol/week: 6.0 standard drinks     Types: 4 Glasses of wine, 2 Drinks containing 0.5 oz of alcohol per week   • Drug use: Yes     Types: Marijuana     Comment: 4-5 times  a year   • Sexual activity: Yes     Partners: Male     Birth control/protection: None     Comment: Partner had a vasectomy       Family History   Problem Relation Age of Onset   • Arthritis Mother    • Stroke Mother    • Arthritis Father    • Cancer Father         colon   • Stroke Father    • Colon cancer Father    • Diabetes Father    • Hypertension Sister    • Cancer Brother         prostate   • Stroke Brother         x 2   • Hypertension Brother    • Colon polyps Brother    • SARA disease Brother    • Irritable bowel syndrome Brother         Brothers   • Hypertension Other        Review of Systems   Constitutional: Negative for activity change, appetite change, fatigue and unexpected weight change.   HENT: Negative for trouble swallowing.    Gastrointestinal: Negative for abdominal distention, abdominal pain, anal bleeding, blood in stool, constipation, diarrhea, nausea, rectal pain and vomiting.        + Heartburn       Vitals:    03/27/23 1312   BP: 148/96   Pulse: 74   Temp: 96.9 °F (36.1 °C)       Physical Exam  Constitutional:       Appearance: She is well-developed.   Abdominal:      General: Bowel sounds are normal. There is no distension.      Palpations: Abdomen is soft. There is no mass.      Tenderness: There is no abdominal tenderness. There is no guarding.      Hernia: No hernia is present.   Skin:     General: Skin is warm and dry.      Capillary Refill: Capillary refill takes less than 2 seconds.   Neurological:      Mental Status: She is alert and oriented to person, place, and time.   Psychiatric:         Behavior: Behavior normal.     Assessment    Diagnoses and all orders for this visit:    1. Gastroesophageal reflux disease, unspecified whether esophagitis present (Primary)    2. Colitis, collagenous    3. Hemorrhoids, unspecified hemorrhoid type  -     Ambulatory Referral to Colorectal Surgery       Plan    Continue Nexium 40 mg once daily  Strict antireflux dietary precautions  Continue to  taper budesonide  Provided the patient with referral to colorectal surgery for evaluation of hemorrhoids  RT 3-month         CAL Monzon  [facility]  [facility] [place] [address]  Office: [phone]    I spent 30 minutes caring for Demi  on this date of service. This time includes time spent by me in the following activities: preparing for the visit, reviewing tests, obtaining and/or reviewing a separately obtained history, performing a medically appropriate examination and/or evaluation , counseling and educating the patient/family/caregiver, ordering medications, tests, or procedures, documenting information in the medical record, independently interpreting results and communicating that information with the patient/family/caregiver and care coordination.

## 2023-04-18 ENCOUNTER — OFFICE VISIT (OUTPATIENT)
Dept: OBSTETRICS AND GYNECOLOGY | Facility: CLINIC | Age: 58
End: 2023-04-18
Payer: MEDICAID

## 2023-04-18 VITALS
SYSTOLIC BLOOD PRESSURE: 134 MMHG | BODY MASS INDEX: 22.98 KG/M2 | WEIGHT: 143 LBS | HEIGHT: 66 IN | DIASTOLIC BLOOD PRESSURE: 72 MMHG

## 2023-04-18 DIAGNOSIS — N95.1 MENOPAUSAL SYMPTOMS: Primary | ICD-10-CM

## 2023-04-18 DIAGNOSIS — Z79.890 HORMONE REPLACEMENT THERAPY, POSTMENOPAUSAL: ICD-10-CM

## 2023-04-18 RX ORDER — PROGESTERONE 200 MG/1
200 CAPSULE ORAL DAILY
Qty: 90 CAPSULE | Refills: 3 | Status: SHIPPED | OUTPATIENT
Start: 2023-04-18

## 2023-04-18 NOTE — PROGRESS NOTES
CC: Hormone replacement  Patient is seen after her previous GYN would no longer take her insurance.  She has been on some compounded hormone replacement therapy.  She takes an estradiol/testosterone cream, 6 mg/20 mg/mL 2 clicks topically twice daily number 30 mL and Prometrium 200 mg daily.  She has had no bleeding other than when she runs out of her Prometrium.  She has been getting hormone checked along with glucose screen and thyroid screens.  She request to have those things checked today.  She overall is feeling very well on her current replacement regimen and has good energy level and thinks that she has been on it since about 2017.  She is up-to-date on mammogram and Pap smears and has no other complaints today she does wish to continue on her current regimen.    We discussed hormone replacement therapy and usual goals of symptom management and lowest dose for shortest duration of time as a rule.  She understands risks of venous thromboembolic events, myocardial events, stroke and breast cancer risks of long-term use.  Assessment: Hormone replacement therapy  Plan: We will check labs to include CMP, testosterone, estradiol, TSH and hemoglobin A1c.  We will continue her current hormone replacement regimen.  I spent 30 minutes caring for Demi on this date of service. This time includes time spent by me in the following activities: preparing for the visit, obtaining and/or reviewing a separately obtained history, counseling and educating the patient/family/caregiver, ordering medications, tests, or procedures and documenting information in the medical record

## 2023-04-19 ENCOUNTER — TELEPHONE (OUTPATIENT)
Dept: OBSTETRICS AND GYNECOLOGY | Facility: CLINIC | Age: 58
End: 2023-04-19
Payer: MEDICAID

## 2023-04-19 LAB
ALBUMIN SERPL-MCNC: 4.6 G/DL (ref 3.8–4.9)
ALBUMIN/GLOB SERPL: 2.2 {RATIO} (ref 1.2–2.2)
ALP SERPL-CCNC: 50 IU/L (ref 44–121)
ALT SERPL-CCNC: 18 IU/L (ref 0–32)
AST SERPL-CCNC: 22 IU/L (ref 0–40)
BILIRUB SERPL-MCNC: 1.2 MG/DL (ref 0–1.2)
BUN SERPL-MCNC: 17 MG/DL (ref 6–24)
BUN/CREAT SERPL: 15 (ref 9–23)
CALCIUM SERPL-MCNC: 9.8 MG/DL (ref 8.7–10.2)
CHLORIDE SERPL-SCNC: 103 MMOL/L (ref 96–106)
CO2 SERPL-SCNC: 23 MMOL/L (ref 20–29)
CREAT SERPL-MCNC: 1.11 MG/DL (ref 0.57–1)
EGFRCR SERPLBLD CKD-EPI 2021: 58 ML/MIN/1.73
ESTRADIOL SERPL-MCNC: 62.5 PG/ML
GLOBULIN SER CALC-MCNC: 2.1 G/DL (ref 1.5–4.5)
GLUCOSE SERPL-MCNC: 96 MG/DL (ref 70–99)
HBA1C MFR BLD: 5.5 % (ref 4.8–5.6)
POTASSIUM SERPL-SCNC: 6.1 MMOL/L (ref 3.5–5.2)
PROT SERPL-MCNC: 6.7 G/DL (ref 6–8.5)
SODIUM SERPL-SCNC: 142 MMOL/L (ref 134–144)

## 2023-04-19 NOTE — TELEPHONE ENCOUNTER
----- Message from Taiwo Mahmood MD sent at 4/19/2023 10:15 AM EDT -----  Demi, your potassium level came back elevated at 6.1.  I recommend that you call your primary care provider as soon as you can to address this.  Your other results have come back normal thus far.

## 2023-04-19 NOTE — TELEPHONE ENCOUNTER
Spoke with Demi, who is home with a head cold today. I told her Dr Mahmood said your potassium is elevated at 6.1 and he would recommend you call your PCP as soon as you can to address this. This can cause issues with your kidneys and heart. Your other results have come back normal thus far. I hope you feel better. She did write down her potassium results.

## 2023-04-19 NOTE — PROGRESS NOTES
Demi, your potassium level came back elevated at 6.1.  I recommend that you call your primary care provider as soon as you can to address this.  Your other results have come back normal thus far.

## 2023-04-20 ENCOUNTER — PATIENT MESSAGE (OUTPATIENT)
Dept: OBSTETRICS AND GYNECOLOGY | Facility: CLINIC | Age: 58
End: 2023-04-20
Payer: MEDICAID

## 2023-04-20 ENCOUNTER — TELEPHONE (OUTPATIENT)
Dept: FAMILY MEDICINE CLINIC | Age: 58
End: 2023-04-20
Payer: MEDICAID

## 2023-04-20 ENCOUNTER — PATIENT ROUNDING (BHMG ONLY) (OUTPATIENT)
Dept: OBSTETRICS AND GYNECOLOGY | Facility: CLINIC | Age: 58
End: 2023-04-20
Payer: MEDICAID

## 2023-04-20 NOTE — PROGRESS NOTES
My chart message has been sent to the patient for PATIENT ROUNDING with [facility].

## 2023-04-20 NOTE — TELEPHONE ENCOUNTER
Pt had appt 4/21/23 for high K+ level. After reviewing chart/labs by provider pt had appt with, noted K+ was critical at 6.1. call placed to pt to inf of critical lab and urgency to be seen at ER. Inf pt if we ord lab tomorrow, results would not be seen until Monday and this could potentially be worsened. Pt advised to go to ER and appt for tomorrow canceled. Pt agreed.

## 2023-04-21 ENCOUNTER — TELEPHONE (OUTPATIENT)
Dept: OBSTETRICS AND GYNECOLOGY | Facility: CLINIC | Age: 58
End: 2023-04-21
Payer: MEDICAID

## 2023-04-21 ENCOUNTER — TELEPHONE (OUTPATIENT)
Dept: FAMILY MEDICINE CLINIC | Age: 58
End: 2023-04-21

## 2023-04-21 NOTE — TELEPHONE ENCOUNTER
Caller: Demi Leija    Relationship: Self    Best call back number: [phone]       What was the call regarding:     THE PATIENT CALLED TO ADVISE THAT SHE DID GO TO THE ER AND HER POTASSIUM LEVELS ARE FINE

## 2023-04-21 NOTE — TELEPHONE ENCOUNTER
Patient is requesting to have medication sent ESTRADIOL TONY-TESTOSTER ENAN IM [[id number] because of pricing issues.    Sent to [facility] - [place], [place] - [address]  - [phone]  - [phone] FX      Please advise,  Thank you   ()

## 2023-06-10 NOTE — TELEPHONE ENCOUNTER
Dr. Mahmood pt is requesting Estradiol Testosterone refill please be sent to compounding pharmacy.     ESTRADIOL TONY-TESTOSTER ENAN IM [[id number]]    Pharmacy confirmed - [facility] - [address] [phone] [facility] [phone] FX    Thank you,   Katherin    Cardiac

## 2023-09-06 ENCOUNTER — TELEPHONE (OUTPATIENT)
Dept: FAMILY MEDICINE CLINIC | Age: 58
End: 2023-09-06
Payer: MEDICAID

## 2023-09-06 NOTE — TELEPHONE ENCOUNTER
Pt is having an MRI in the morning of her back she is asking for something for her anxiety [facility] is where she wants it . MRI is scheduled for 7 am

## 2023-09-07 DIAGNOSIS — F40.240 CLAUSTROPHOBIA: Primary | ICD-10-CM

## 2023-09-07 RX ORDER — HYDROXYZINE 50 MG/1
50 TABLET, FILM COATED ORAL ONCE AS NEEDED
Qty: 10 TABLET | Refills: 0 | Status: SHIPPED | OUTPATIENT
Start: 2023-09-07

## 2023-09-28 RX ORDER — PROGESTERONE 200 MG/1
200 CAPSULE ORAL DAILY
Qty: 90 CAPSULE | Refills: 3 | Status: SHIPPED | OUTPATIENT
Start: 2023-09-28

## 2023-09-28 RX ORDER — PROGESTERONE 200 MG/1
200 CAPSULE ORAL DAILY
Qty: 90 CAPSULE | Refills: 3 | Status: SHIPPED | OUTPATIENT
Start: 2023-09-28 | End: 2023-09-28 | Stop reason: SDUPTHER

## 2023-10-11 ENCOUNTER — OFFICE VISIT (OUTPATIENT)
Dept: FAMILY MEDICINE CLINIC | Facility: CLINIC | Age: 58
End: 2023-10-11
Payer: MEDICAID

## 2023-10-11 VITALS
OXYGEN SATURATION: 99 % | RESPIRATION RATE: 18 BRPM | HEIGHT: 66 IN | BODY MASS INDEX: 22.98 KG/M2 | DIASTOLIC BLOOD PRESSURE: 92 MMHG | WEIGHT: 143 LBS | HEART RATE: 70 BPM | SYSTOLIC BLOOD PRESSURE: 128 MMHG

## 2023-10-11 DIAGNOSIS — F51.01 PRIMARY INSOMNIA: ICD-10-CM

## 2023-10-11 DIAGNOSIS — R03.0 ELEVATED BP WITHOUT DIAGNOSIS OF HYPERTENSION: ICD-10-CM

## 2023-10-11 DIAGNOSIS — H93.8X3 EAR PRESSURE, BILATERAL: ICD-10-CM

## 2023-10-11 DIAGNOSIS — Z00.00 ENCOUNTER FOR PREVENTATIVE ADULT HEALTH CARE EXAMINATION: Primary | ICD-10-CM

## 2023-10-11 DIAGNOSIS — G89.29 CHRONIC RIGHT-SIDED LOW BACK PAIN WITH RIGHT-SIDED SCIATICA: ICD-10-CM

## 2023-10-11 DIAGNOSIS — M54.41 CHRONIC RIGHT-SIDED LOW BACK PAIN WITH RIGHT-SIDED SCIATICA: ICD-10-CM

## 2023-10-11 PROBLEM — M51.26 HERNIATED LUMBAR INTERVERTEBRAL DISC: Status: ACTIVE | Noted: 2023-10-11

## 2023-10-11 PROBLEM — K21.9 GASTROESOPHAGEAL REFLUX DISEASE: Status: ACTIVE | Noted: 2023-10-11

## 2023-10-11 PROBLEM — K52.831 COLLAGENOUS COLITIS: Status: ACTIVE | Noted: 2023-10-11

## 2023-10-11 RX ORDER — FAMOTIDINE 40 MG/1
TABLET, FILM COATED ORAL
COMMUNITY
Start: 2023-08-07 | End: 2023-10-11

## 2023-10-11 RX ORDER — ESTRADIOL 0.1 MG/G
2 CREAM VAGINAL DAILY
COMMUNITY
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
## 2023-10-20 ENCOUNTER — TELEPHONE (OUTPATIENT)
Dept: FAMILY MEDICINE CLINIC | Facility: CLINIC | Age: 58
End: 2023-10-20

## 2023-10-20 ENCOUNTER — TELEPHONE (OUTPATIENT)
Dept: FAMILY MEDICINE CLINIC | Facility: CLINIC | Age: 58
End: 2023-10-20
Payer: MEDICAID

## 2023-10-20 NOTE — TELEPHONE ENCOUNTER
Patient asking for call back regarding a visit to the Er. Questions regarding blood clot. Call back number [phone].

## 2023-10-20 NOTE — TELEPHONE ENCOUNTER
Spoke with patient, patient verbalized understanding. Heat, can walk around the house, and tylenol for pain

## 2023-10-20 NOTE — TELEPHONE ENCOUNTER
Caller: Demi Leija    Relationship: Self    Best call back number: [phone]    What is the best time to reach you: ANYTIME     Who are you requesting to speak with (clinical staff, provider,  specific staff member): CLINICAL STAFF     Do you know the name of the person who called: SELF     What was the call regarding: PATIENT CALLED AND STATE TO PLEASE GIVER HER A CALL ASAP! SHE WAS IN THE ER ON 10/19/23 WITH A BLOOD CLOT.PLEASE CALL     Is it okay if the provider responds through MyChart: NO

## 2023-10-24 ENCOUNTER — TELEPHONE (OUTPATIENT)
Dept: FAMILY MEDICINE CLINIC | Facility: CLINIC | Age: 58
End: 2023-10-24

## 2023-10-24 NOTE — TELEPHONE ENCOUNTER
Caller: Demi Leija    Relationship: Self    Best call back number: [phone]    What was the call regarding: PATIENT STATES THAT SHE DEVELOPED A BLOOD CLOT AFTER SURGERY AND WENT TO THE EMERGENCY ROOM. PATIENT STATES THAT SHE WAS GIVEN BLOOD THINNERS. PATIENT IS UNSURE OF WHO PRESCRIBED HER THIS. PATIENT'S SURGEON  SAID THAT HE WOULD NOT BE ABLE TO PRESCRIBE THIS.     PATIENT STATES THAT SHE HAD TO GO BACK TO THE HOSPITAL DUE TO HAVING AN INFECTION YESTERDAY. ONE OF THE WOUNDS GOT INFECTED AND THEY  GAVE HER AN ANTIBIOTICS. THEY WANTED THE PATIENT TO UPDATE JOJO ON THIS.    PATIENT IS REQUESTING CALL FROM JOJO TO DISCUSS. PLEASE CALL PATIENT ASAP TO DISCUSS NEXT STEPS FOR CARE.

## 2023-10-26 NOTE — TELEPHONE ENCOUNTER
PATIENT IS CALLING TO CHECK THE STATUS OF REQUESTING A CALL BACK FROM MS MANZO.  SHE STATES SHE NEEDS TO DISCUSS THE ISSUE REGARDING A BLOOD CLOT AFTER SURGERY.  SHE STATES SHE IS HAVING MOBILITY ISSUE SINCE BACK SURGERY.  SHE STATES SHE CAN SCHEDULE A VIDEO VISIT IF NEEDS TO.  SHE STATES SHE HAS QUESTIONS REGARDING BLOOD THINNERS AND A BLOOD CLOT.  SHE STATES SHE WOULD APPRECIATE A CALL FROM MS MANZO BEFORE THE WEEKEND.    PATIENT CALL BACK [phone]     PLEASE ADVISE.

## 2023-10-30 ENCOUNTER — TELEPHONE (OUTPATIENT)
Dept: FAMILY MEDICINE CLINIC | Facility: CLINIC | Age: 58
End: 2023-10-30
Payer: MEDICAID

## 2023-10-30 NOTE — TELEPHONE ENCOUNTER
Patient went to hospital for possible blood clot was told to follow up with her PCP this week.  Patient is needing a morning appt. Can you help me schedule her with Raven

## 2023-10-31 ENCOUNTER — TELEPHONE (OUTPATIENT)
Dept: FAMILY MEDICINE CLINIC | Facility: CLINIC | Age: 58
End: 2023-10-31

## 2023-10-31 NOTE — TELEPHONE ENCOUNTER
Caller: Demi Leija    Relationship: Self    Best call back number: [phone]     What orders are you requesting (i.e. lab or imaging): BLOOD WORK    In what timeframe would the patient need to come in: ASAP    Where will you receive your lab/imaging services: IN OFFICE    Additional notes: PATIENT CALLING STATING THAT SHE HASN'T HAD HER BLOOD WORK CHECKED SINCE BEING DIAGNOSED WITH A BLOOD CLOT ON 10/18 OR 10/19. SHE WOULD LIKE TO GET THIS CHECKED TO SEE IF THE MEDICATION IS HELPING

## 2023-11-01 ENCOUNTER — TELEPHONE (OUTPATIENT)
Dept: FAMILY MEDICINE CLINIC | Facility: CLINIC | Age: 58
End: 2023-11-01
Payer: MEDICAID

## 2023-11-01 NOTE — TELEPHONE ENCOUNTER
Late entry, have attempted to call patient this week, spoke with her 10-, she updated me on complication following back surgery and blood clot.  She is still having some leg pain.  No discoloration, no erythema, no heat, she is on daily blood thinner.  She will be seen this week and have evaluation and lab work done

## 2023-11-02 ENCOUNTER — OFFICE VISIT (OUTPATIENT)
Dept: FAMILY MEDICINE CLINIC | Facility: CLINIC | Age: 58
End: 2023-11-02
Payer: MEDICAID

## 2023-11-02 VITALS
RESPIRATION RATE: 18 BRPM | OXYGEN SATURATION: 99 % | HEART RATE: 70 BPM | HEIGHT: 66 IN | WEIGHT: 138 LBS | BODY MASS INDEX: 22.18 KG/M2 | DIASTOLIC BLOOD PRESSURE: 86 MMHG | SYSTOLIC BLOOD PRESSURE: 138 MMHG

## 2023-11-02 DIAGNOSIS — I82.401 ACUTE DEEP VEIN THROMBOSIS (DVT) OF RIGHT LOWER EXTREMITY, UNSPECIFIED VEIN: Primary | ICD-10-CM

## 2023-11-02 DIAGNOSIS — Z13.228 ENCOUNTER FOR SCREENING FOR METABOLIC DISORDER: ICD-10-CM

## 2023-11-02 DIAGNOSIS — Z13.220 SCREENING, LIPID: ICD-10-CM

## 2023-11-02 DIAGNOSIS — Z79.01 ANTICOAGULATED: ICD-10-CM

## 2023-11-02 PROBLEM — I82.409 DVT (DEEP VENOUS THROMBOSIS): Status: ACTIVE | Noted: 2023-11-02

## 2023-11-02 RX ORDER — SULFAMETHOXAZOLE AND TRIMETHOPRIM 800; 160 MG/1; MG/1
1 TABLET ORAL 2 TIMES DAILY
COMMUNITY

## 2023-11-02 RX ORDER — CYCLOBENZAPRINE HCL 10 MG
10 TABLET ORAL 3 TIMES DAILY PRN
COMMUNITY

## 2023-11-02 RX ORDER — FAMOTIDINE 40 MG/1
TABLET, FILM COATED ORAL
COMMUNITY
Start: 2023-10-13

## 2023-11-02 RX ORDER — RIVAROXABAN 15 MG-20MG
KIT ORAL
COMMUNITY
Start: 2023-10-19

## 2023-11-02 NOTE — PROGRESS NOTES
Subjective   Demi Leija is a 57 y.o. female.     History of Present Illness   Estab pt     Had recent back surgery 10/13/23 and saw spine MD Dr Banegas yesterday. Pt suffered from  post op infection and is on Bactrim DS . Spine MD states back looks better, No fever, no dng, no heat.  She is taking cyclobenzaprine for spasms, not on pain meds. She has follow up in 2 weeks for xrays and she will continue back brace.     Acute R leg DVT post surgery.  Some pain and aching in R leg, hit back of thigh this week and had achy pain. R leg still + Arvind's , no erythema and swelling in leg or calf. She is on xarelto 15 mg bid x 21 days then 20 mg qd. She has no hx bloodclots of family hx clots. Denies bruising or bleeding. Needs heme referral.     The following portions of the patient's history were reviewed and updated as appropriate: allergies, current medications, past family history, past medical history, past social history, past surgical history and problem list.    Review of Systems    Objective   Physical Exam  Vitals reviewed.   Constitutional:       Appearance: Normal appearance. She is not ill-appearing.   HENT:      Mouth/Throat:      Mouth: Mucous membranes are moist.   Cardiovascular:      Rate and Rhythm: Normal rate and regular rhythm.      Pulses: Normal pulses.      Heart sounds: Normal heart sounds.   Pulmonary:      Effort: Pulmonary effort is normal.      Breath sounds: Normal breath sounds.   Abdominal:      General: Bowel sounds are normal.   Musculoskeletal:         General: Tenderness (R calf + Arvind's) present. No swelling. Normal range of motion.        Back:       Right lower leg: No edema.      Left lower leg: No edema.      Comments: Healing surgical wound, no erythema, heat or dng   Skin:     General: Skin is warm.      Findings: No erythema.   Neurological:      Mental Status: She is alert.         Vitals:    11/02/23 1113   BP: 138/86   Pulse: 70   Resp: 18   SpO2: 99%     Body mass  index is 22.27 kg/m².    Procedures    Assessment & Plan   Problems Addressed this Visit       DVT (deep venous thrombosis) - Primary    Relevant Orders    Ambulatory Referral to Hematology     Other Visit Diagnoses       Encounter for screening for metabolic disorder        Relevant Orders    Comprehensive Metabolic Panel    Screening, lipid        Relevant Orders    Lipid panel    Anticoagulated        Relevant Orders    CBC & Differential    Ambulatory Referral to Hematology          Diagnoses         Codes Comments    Acute deep vein thrombosis (DVT) of right lower extremity, unspecified vein    -  Primary ICD-10-CM: I82.401  ICD-9-CM: 453.40     Encounter for screening for metabolic disorder     ICD-10-CM: Z13.228  ICD-9-CM: V77.99     Screening, lipid     ICD-10-CM: Z13.220  ICD-9-CM: V77.91     Anticoagulated     ICD-10-CM: Z79.01  ICD-9-CM: V58.61           Orders Placed This Encounter   Procedures    Comprehensive Metabolic Panel     Order Specific Question:   Release to patient     Answer:   Routine Release [[id number]]    Lipid panel     Order Specific Question:   Release to patient     Answer:   Routine Release [[id number]]    Ambulatory Referral to Hematology     Referral Priority:   Routine     Referral Type:   Consultation     Referral Reason:   Specialty Services Required     Requested Specialty:   Hematology     Number of Visits Requested:   1    CBC & Differential     Order Specific Question:   Release to patient     Answer:   Routine Release [[id number]]     DVT- cw xarelto, reviewed risks bleeding, falls, refer hematology  Reviewed proper use of ER, ER for chest pain, soa, color or temp chenge in leg or foot    Pt is requesting lipid panel w labs , enc fiber, healthy diet and exercise            Education provided in AVS   No follow-ups on file.

## 2023-11-03 LAB
ALBUMIN SERPL-MCNC: 4.6 G/DL (ref 3.8–4.9)
ALBUMIN/GLOB SERPL: 2.1 {RATIO} (ref 1.2–2.2)
ALP SERPL-CCNC: 100 IU/L (ref 44–121)
ALT SERPL-CCNC: 26 IU/L (ref 0–32)
AST SERPL-CCNC: 26 IU/L (ref 0–40)
BASOPHILS # BLD AUTO: 0.1 X10E3/UL (ref 0–0.2)
BASOPHILS NFR BLD AUTO: 1 %
BILIRUB SERPL-MCNC: 0.6 MG/DL (ref 0–1.2)
BUN SERPL-MCNC: 10 MG/DL (ref 6–24)
BUN/CREAT SERPL: 10 (ref 9–23)
CALCIUM SERPL-MCNC: 9.8 MG/DL (ref 8.7–10.2)
CHLORIDE SERPL-SCNC: 99 MMOL/L (ref 96–106)
CHOLEST SERPL-MCNC: 235 MG/DL (ref 100–199)
CO2 SERPL-SCNC: 21 MMOL/L (ref 20–29)
CREAT SERPL-MCNC: 1.03 MG/DL (ref 0.57–1)
EGFRCR SERPLBLD CKD-EPI 2021: 63 ML/MIN/1.73
EOSINOPHIL # BLD AUTO: 0.3 X10E3/UL (ref 0–0.4)
EOSINOPHIL NFR BLD AUTO: 3 %
ERYTHROCYTE [DISTWIDTH] IN BLOOD BY AUTOMATED COUNT: 11.6 % (ref 11.7–15.4)
GLOBULIN SER CALC-MCNC: 2.2 G/DL (ref 1.5–4.5)
GLUCOSE SERPL-MCNC: 86 MG/DL (ref 70–99)
HCT VFR BLD AUTO: 39.3 % (ref 34–46.6)
HDLC SERPL-MCNC: 50 MG/DL
HGB BLD-MCNC: 13.2 G/DL (ref 11.1–15.9)
IMM GRANULOCYTES # BLD AUTO: 0.1 X10E3/UL (ref 0–0.1)
IMM GRANULOCYTES NFR BLD AUTO: 1 %
LDLC SERPL CALC-MCNC: 156 MG/DL (ref 0–99)
LYMPHOCYTES # BLD AUTO: 2.2 X10E3/UL (ref 0.7–3.1)
LYMPHOCYTES NFR BLD AUTO: 26 %
MCH RBC QN AUTO: 31 PG (ref 26.6–33)
MCHC RBC AUTO-ENTMCNC: 33.6 G/DL (ref 31.5–35.7)
MCV RBC AUTO: 92 FL (ref 79–97)
MONOCYTES # BLD AUTO: 0.9 X10E3/UL (ref 0.1–0.9)
MONOCYTES NFR BLD AUTO: 10 %
NEUTROPHILS # BLD AUTO: 5.1 X10E3/UL (ref 1.4–7)
NEUTROPHILS NFR BLD AUTO: 59 %
PLATELET # BLD AUTO: 411 X10E3/UL (ref 150–450)
POTASSIUM SERPL-SCNC: 5.4 MMOL/L (ref 3.5–5.2)
PROT SERPL-MCNC: 6.8 G/DL (ref 6–8.5)
RBC # BLD AUTO: 4.26 X10E6/UL (ref 3.77–5.28)
SODIUM SERPL-SCNC: 138 MMOL/L (ref 134–144)
TRIGL SERPL-MCNC: 162 MG/DL (ref 0–149)
VLDLC SERPL CALC-MCNC: 29 MG/DL (ref 5–40)
WBC # BLD AUTO: 8.7 X10E3/UL (ref 3.4–10.8)

## 2023-11-09 NOTE — TELEPHONE ENCOUNTER
Caller: Demi Leija    Relationship: Self    Best call back number: [phone]     Requested Prescriptions:   Requested Prescriptions     Pending Prescriptions Disp Refills    Xarelto Starter Pack tablet therapy pack starter pack       Sig: Take one 15 mg tablet twice daily with food for 21 days.  Followed by one 20 mg tablet by mouth once daily with food. Take as directed        Pharmacy where request should be sent: [facility] [id number] [place] [address] [address] AT [facility] [address] - [phone]  - [phone] FX     Last office visit with prescribing clinician: 11/2/2023   Last telemedicine visit with prescribing clinician: Visit date not found   Next office visit with prescribing clinician: Visit date not found     Additional details provided by patient: PATIENT WILL RUN OUT OF MEDICATION ON MONDAY. SHE WILL NOT SEE THE SPECIALIST UNTIL DECEMBER SO SHE IS HOPING PCP CAN DO A REFILL UNTIL THEN.    Does the patient have less than a 3 day supply:  [] Yes  [x] No    Would you like a call back once the refill request has been completed: [] Yes [x] No    If the office needs to give you a call back, can they leave a voicemail: [] Yes [x] No    Fermin Lee Rep   11/09/23 16:14 EST

## 2023-11-10 ENCOUNTER — TELEPHONE (OUTPATIENT)
Dept: FAMILY MEDICINE CLINIC | Facility: CLINIC | Age: 58
End: 2023-11-10
Payer: MEDICAID

## 2023-11-10 RX ORDER — RIVAROXABAN 15 MG-20MG
KIT ORAL
OUTPATIENT
Start: 2023-11-10

## 2023-11-10 NOTE — TELEPHONE ENCOUNTER
Patient was seen by Raven 11/2/23 for DVT post surgery. She is currently on Xarelto starter pack at 15 mg BID for 21 days followed by 20 mg once daily. She will finish the 15 mg BID tomorrow and will be out of Xarelto. She is not scheduled to see Hematology until 12/13/23 who will not prescribe anything until she is established.     Refill request for the Xarelto came through yesterday for Raven, which is still pending. Patient does not want to be without the blood thinner over the weekend and needing the refill asap due to recent DVT. She states she was advised to continue the starter pack dose at 15 mg BID for another 21 days, but I do not see any documentation of that in Raven's note. Patient is requesting a call back asap.

## 2023-11-10 NOTE — TELEPHONE ENCOUNTER
Spoke with 2 providers in the office. Pt to take 20mg daily. I sent in enough to get her through to her hematology appointment in December. Pt is aware and verbalized understanding to take 20mg daily.

## 2023-12-04 ENCOUNTER — TELEPHONE (OUTPATIENT)
Dept: FAMILY MEDICINE CLINIC | Facility: CLINIC | Age: 58
End: 2023-12-04

## 2023-12-04 NOTE — TELEPHONE ENCOUNTER
Caller: Demi Leija    Relationship: Self    Best call back number: [phone]    What medication are you requesting: SLEEPING MEDICATION    What are your current symptoms: PROBLEMS SLEEPING     How long have you been experiencing symptoms: 2 WEEKS    Have you had these symptoms before:    [x] Yes  [] No    Have you been treated for these symptoms before:   [x] Yes  [] No    If a prescription is needed, what is your preferred pharmacy and phone number: [facility] [id number] - [facility] [address] AT [facility] [address] - [phone]  - [phone] FX     Additional notes:PATIENT GOT PRESCRIBED GABAPENTIN IN 2019 THAT HELP WITH SLEEP AND NERVE PAIN. PATIENT WOULD MICHA TO HAVE JOJO MANZO CALL HER REGARDING POSSIBLY STARTING THIS.

## 2023-12-07 ENCOUNTER — OFFICE VISIT (OUTPATIENT)
Dept: FAMILY MEDICINE CLINIC | Facility: CLINIC | Age: 58
End: 2023-12-07
Payer: MEDICAID

## 2023-12-07 VITALS
DIASTOLIC BLOOD PRESSURE: 84 MMHG | BODY MASS INDEX: 21.86 KG/M2 | HEIGHT: 66 IN | SYSTOLIC BLOOD PRESSURE: 128 MMHG | RESPIRATION RATE: 18 BRPM | HEART RATE: 76 BPM | OXYGEN SATURATION: 98 % | WEIGHT: 136 LBS

## 2023-12-07 DIAGNOSIS — G62.89 OTHER POLYNEUROPATHY: ICD-10-CM

## 2023-12-07 DIAGNOSIS — F51.01 PRIMARY INSOMNIA: Primary | ICD-10-CM

## 2023-12-07 PROCEDURE — 1159F MED LIST DOCD IN RCRD: CPT | Performed by: NURSE PRACTITIONER

## 2023-12-07 PROCEDURE — 99214 OFFICE O/P EST MOD 30 MIN: CPT | Performed by: NURSE PRACTITIONER

## 2023-12-07 PROCEDURE — 1160F RVW MEDS BY RX/DR IN RCRD: CPT | Performed by: NURSE PRACTITIONER

## 2023-12-07 RX ORDER — HYDROXYZINE HYDROCHLORIDE 25 MG/1
25 TABLET, FILM COATED ORAL
Qty: 30 TABLET | Refills: 0 | Status: SHIPPED | OUTPATIENT
Start: 2023-12-07 | End: 2024-01-06

## 2023-12-07 RX ORDER — DULOXETIN HYDROCHLORIDE 20 MG/1
20 CAPSULE, DELAYED RELEASE ORAL DAILY
Qty: 30 CAPSULE | Refills: 0 | Status: SHIPPED | OUTPATIENT
Start: 2023-12-07 | End: 2024-01-06

## 2023-12-07 NOTE — PROGRESS NOTES
Subjective   Demi Leija is a 58 y.o. female.     Insomnia    Established patient here for acute concerns    Follow-up back surgery with Dr. Verdugo 1.5 months ago.  Pain/sciatica resolved she has residual right leg tightness, some numbness in leg and right foot.  Waiting on PT. on her right back incision and she feels like a stitch is trying to work itself free and a small knot on scar.  No drainage, no heat, no redness    History of R DVT postsurgery. On xarelto 20 mg qd. Seeing hematology upcoming.  She denies swelling, does have numbness and pain in R foot and R leg pain.    Worse insomnia since surgery and lower legs are very achy at night.  She has been wrapping legs in heating pads and having difficulty sleeping. On flexeril 10 mg as needed. She takes melatonin, magnesium and tylenol PM (this causes morning headache).  Patient has been on buspirone in the past for anxiety and Lunesta in the past.  She would like to consider medication today.  She is sleeping for 2 or 3-hour increments and waking up.      The following portions of the patient's history were reviewed and updated as appropriate: allergies, current medications, past family history, past medical history, past social history, past surgical history and problem list.    Review of Systems   Psychiatric/Behavioral:  The patient has insomnia.        Objective   Physical Exam  Vitals reviewed.   Constitutional:       Appearance: Normal appearance.   HENT:      Mouth/Throat:      Mouth: Mucous membranes are moist.   Cardiovascular:      Rate and Rhythm: Normal rate and regular rhythm.      Pulses: Normal pulses.      Heart sounds: Normal heart sounds.   Pulmonary:      Effort: Pulmonary effort is normal.      Breath sounds: Normal breath sounds.   Abdominal:      General: Bowel sounds are normal.   Skin:     General: Skin is warm.             Comments: Small pinpoint bump at top of right back incision scar.  Small hair or stitch under the surface of  the skin, attempted to remove with sterile 25-gauge needle per patient request   Neurological:      General: No focal deficit present.      Mental Status: She is alert.   Psychiatric:         Mood and Affect: Mood normal.         Vitals:    12/07/23 1302   BP: 128/84   Pulse: 76   Resp: 18   SpO2: 98%     Body mass index is 21.95 kg/m².    Procedures    Assessment & Plan   Problems Addressed this Visit    None  Visit Diagnoses       Primary insomnia    -  Primary    Other polyneuropathy              Diagnoses         Codes Comments    Primary insomnia    -  Primary ICD-10-CM: F51.01  ICD-9-CM: 307.42     Other polyneuropathy     ICD-10-CM: G62.89  ICD-9-CM: 357.89           Insomnia-reviewed past medication list, discussed risk versus benefit of sedating medications, continue with magnesium and melatonin, reviewed sleep hygiene, add hydroxyzine 25 mg as needed 1 hour before bed.  Do not use alcohol or drive with this medication as it may cause sedation.  Call if no improvement in 2 weeks    Neuropathy-discussed risk versus benefit of medication with patient and nerves may regenerate over time after surgery, can apply topical rubs as needed for pain including Voltaren, can take Tylenol for pain.  No NSAIDs with blood thinner.  Rx Cymbalta 20 mg daily, touch base with provider in 1 week to evaluate for possible increase    Chronic pain post surgery-start PT, ice, heat, position changes or topical rubs as needed  No signs of infection to scar line, apply bacitracin as needed, call if drainage, redness, heat or swelling         Education provided in AVS   No follow-ups on file.

## 2023-12-13 ENCOUNTER — CONSULT (OUTPATIENT)
Dept: ONCOLOGY | Facility: CLINIC | Age: 58
End: 2023-12-13
Payer: MEDICAID

## 2023-12-13 VITALS
DIASTOLIC BLOOD PRESSURE: 79 MMHG | OXYGEN SATURATION: 98 % | HEIGHT: 66 IN | HEART RATE: 67 BPM | TEMPERATURE: 97.8 F | BODY MASS INDEX: 22.47 KG/M2 | WEIGHT: 139.8 LBS | RESPIRATION RATE: 16 BRPM | SYSTOLIC BLOOD PRESSURE: 131 MMHG

## 2023-12-13 DIAGNOSIS — I82.462 ACUTE DEEP VEIN THROMBOSIS (DVT) OF CALF MUSCLE VEIN OF LEFT LOWER EXTREMITY: Primary | ICD-10-CM

## 2023-12-13 NOTE — PROGRESS NOTES
Subjective     REASON FOR CONSULTATION: Right calf vein thrombosis  Provide an opinion on any further workup or treatment                             REQUESTING PHYSICIAN:  Lanza    RECORDS OBTAINED:  Records of the patients history including those obtained from the referring provider were reviewed and summarized in detail.    HISTORY OF PRESENT ILLNESS:  The patient is a 58 y.o. year old female who is here for an opinion about the above issue.    History of Present Illness   This is a pleasant 58-year-old lady seen today for consultation regarding a postoperative right calf vein thrombosis.  The patient initially had foot surgery and then underwent a spine laminectomy in October 2023 after which she was hospitalized 3 to 4 days.  Soon after hospital discharge the patient began to develop pain in the right calf making it difficult for her to bear weight.  She was seen and underwent Doppler ultrasound 10/19/2023 which showed acute deep vein thrombosis in the right posterior tibial vein.  She was placed on anticoagulation with Xarelto.  The patient had ER visit on 11/6/2023 for shortness of breath and abdominal pain for which she underwent CT angiogram chest and CT abdomen/pelvis showing no pulmonary embolism or propagation of clot.    The patient denies any previous history of deep vein or arterial thrombosis.  She denies familial history of DVT.  She has no known autoimmune disorders.    At this point she has recuperated well from her spine surgery and back to fairly normal activity/ambulation.  She reports no current pain or swelling of the right calf.    Past Medical History:   Diagnosis Date    Acquired absence of other specified parts of digestive tract     Chronic serous otitis media     Colitis     Crohn disease     Diarrhea, unspecified     Gastroesophageal reflux disease 10/11/2023    Headache     Hypothyroidism     Lumbar herniated disc     Pain in joints of unspecified hand     Post-menopause      Sciatica associated with disorder of lumbar spine         Past Surgical History:   Procedure Laterality Date    ABDOMINOPLASTY  2012    mini    BUNIONECTOMY Bilateral 2010    CHOLECYSTECTOMY  2021    COLONOSCOPY  10/2021    FOOT FRACTURE SURGERY Left     great toe    HEMORRHOIDECTOMY  2010    RHINOPLASTY  1995    STOMACH SURGERY      TOE SURGERY      TONSILLECTOMY      WISDOM TOOTH EXTRACTION          Current Outpatient Medications on File Prior to Visit   Medication Sig Dispense Refill    DULoxetine (CYMBALTA) 20 MG capsule Take 1 capsule by mouth Daily for 30 days. 30 capsule 0    famotidine (PEPCID) 40 MG tablet 14 Tab, 0 Refill(s), 0 Refill(s)      hydrOXYzine (ATARAX) 25 MG tablet Take 1 tablet by mouth every night at bedtime for 30 days. 30 tablet 0    rivaroxaban (XARELTO) 20 MG tablet Take 1 tablet by mouth Daily. 33 tablet 0    cyclobenzaprine (FLEXERIL) 10 MG tablet Take 1 tablet by mouth 3 (Three) Times a Day As Needed for Muscle Spasms. (Patient not taking: Reported on 12/13/2023)      estradiol (ESTRACE) 0.1 MG/GM vaginal cream Insert 2 applicators into the vagina Daily. (Patient not taking: Reported on 12/13/2023)      Progesterone (PROMETRIUM) 200 MG capsule Take 1 capsule by mouth Daily. (Patient not taking: Reported on 12/13/2023) 90 capsule 3     No current facility-administered medications on file prior to visit.        ALLERGIES:  No Known Allergies     Social History     Socioeconomic History    Marital status: Single    Number of children: 2    Years of education: HIGH SCHOOL   Tobacco Use    Smoking status: Never    Smokeless tobacco: Never   Vaping Use    Vaping Use: Never used   Substance and Sexual Activity    Alcohol use: Yes     Alcohol/week: 6.0 standard drinks of alcohol     Types: 4 Glasses of wine, 2 Drinks containing 0.5 oz of alcohol per week    Drug use: Yes     Types: Marijuana     Comment: 4-5 times a year    Sexual activity: Yes     Partners: Male     Birth control/protection:  "None, Post-menopausal     Comment: Partner had a vasectomy        Family History   Problem Relation Age of Onset    Diabetes Mother     Arthritis Mother     Stroke Mother     Arthritis Father     Cancer Father         colon    Stroke Father     Colon cancer Father     Diabetes Father     Hypertension Sister     Cancer Brother         prostate    Stroke Brother         x 2    Hypertension Brother     Colon polyps Brother     SARA disease Brother     Irritable bowel syndrome Brother         Brothers    Prostate cancer Brother     Hypertension Brother     Stroke Brother     Prostate cancer Brother     Stroke Brother     Hypertension Brother     Stroke Maternal Grandmother     Diabetes Maternal Grandmother     Colon cancer Maternal Grandmother     Stroke Paternal Grandmother     Hypertension Other         Review of Systems   Constitutional: Negative.    HENT: Negative.     Respiratory: Negative.     Cardiovascular: Negative.    Gastrointestinal: Negative.    Musculoskeletal:  Positive for back pain.   Skin: Negative.    Allergic/Immunologic: Negative.    Hematological: Negative.    Psychiatric/Behavioral:  The patient is nervous/anxious.           Objective     Vitals:    12/13/23 1113   BP: 131/79   Pulse: 67   Resp: 16   Temp: 97.8 °F (36.6 °C)   TempSrc: Temporal   SpO2: 98%   Weight: 63.4 kg (139 lb 12.8 oz)   Height: 167.6 cm (65.98\")   PainSc: 0-No pain         12/13/2023    11:13 AM   Current Status   ECOG score 0       Physical Exam    CONSTITUTIONAL: pleasant well-developed adult woman  HEENT: no icterus, no thrush, moist membranes  LYMPH: no cervical or supraclavicular lad  CV: RRR, S1S2, no murmur  RESP: cta bilat, no wheezing, no rales  GI: soft, non-tender, no splenomegaly, +bs  MUSC: no edema, lumbar scar healing well, no lower extremity edema  NEURO: alert and oriented x3, normal strength  PSYCH: normal mood and affect      RECENT LABS:  Hematology WBC   Date Value Ref Range Status   11/02/2023 8.7 3.4 - " 10.8 x10E3/uL Final     RBC   Date Value Ref Range Status   11/02/2023 4.26 3.77 - 5.28 x10E6/uL Final     Hemoglobin   Date Value Ref Range Status   11/02/2023 13.2 11.1 - 15.9 g/dL Final     Hematocrit   Date Value Ref Range Status   11/02/2023 39.3 34.0 - 46.6 % Final     Platelets   Date Value Ref Range Status   11/02/2023 411 150 - 450 x10E3/uL Final        Lab Results   Component Value Date    GLUCOSE 86 11/02/2023    BUN 10 11/02/2023    CREATININE 1.03 (H) 11/02/2023    EGFRRESULT 63 11/02/2023    BCR 10 11/02/2023    K 5.4 (H) 11/02/2023    CO2 21 11/02/2023    CALCIUM 9.8 11/02/2023    PROTENTOTREF 6.8 11/02/2023    ALBUMIN 4.6 11/02/2023    BILITOT 0.6 11/02/2023    AST 26 11/02/2023    ALT 26 11/02/2023     Venous duplex right lower extremity 10/19/2023-acute DVT right posterior tibial vein      Assessment & Plan     This is a pleasant 58-year-old woman who experienced a postoperative calf vein thrombosis of the right lower extremity documented by Doppler ultrasound 10/19/2023 just a few days post op lumbar laminectomy/hospitalization.  She has completed 2 months of anticoagulation.  She is now asymptomatic.  She has no personal or family history of DVT so I do not think hypercoagulable evaluation is required.  I am going to set her up for a repeat Doppler ultrasound to make sure the thrombosis has either resolved or formed chronic thrombosis but unless there is subacute thrombus present I think the patient can safely discontinue anticoagulation at this time.  Of note the patient takes bioidentical hormone replacement therapy and she will need to discuss carefully with the prescribing physician pros and cons of continuing this given her recent DVT.

## 2023-12-13 NOTE — LETTER
December 13, 2023       No Recipients    Patient: Demi Leija   YOB: 1965   Date of Visit: 12/13/2023     Dear CAL Calloway:       Thank you for referring Demi Leija to me for evaluation. Below are the relevant portions of my assessment and plan of care.    If you have questions, please do not hesitate to call me. I look forward to following Demi along with you.         Sincerely,        Wilian Jenkins MD        CC:   No Recipients    Wilian Jenkins MD  12/13/23 1215  Sign when Signing Visit    Subjective    REASON FOR CONSULTATION: Right calf vein thrombosis  Provide an opinion on any further workup or treatment                             REQUESTING PHYSICIAN:  Myla    RECORDS OBTAINED:  Records of the patients history including those obtained from the referring provider were reviewed and summarized in detail.    HISTORY OF PRESENT ILLNESS:  The patient is a 58 y.o. year old female who is here for an opinion about the above issue.    History of Present Illness   This is a pleasant 58-year-old lady seen today for consultation regarding a postoperative right calf vein thrombosis.  The patient initially had foot surgery and then underwent a spine laminectomy in October 2023 after which she was hospitalized 3 to 4 days.  Soon after hospital discharge the patient began to develop pain in the right calf making it difficult for her to bear weight.  She was seen and underwent Doppler ultrasound 10/19/2023 which showed acute deep vein thrombosis in the right posterior tibial vein.  She was placed on anticoagulation with Xarelto.  The patient had ER visit on 11/6/2023 for shortness of breath and abdominal pain for which she underwent CT angiogram chest and CT abdomen/pelvis showing no pulmonary embolism or propagation of clot.    The patient denies any previous history of deep vein or arterial thrombosis.  She denies familial history of DVT.  She has no known autoimmune disorders.    At  this point she has recuperated well from her spine surgery and back to fairly normal activity/ambulation.  She reports no current pain or swelling of the right calf.    Past Medical History:   Diagnosis Date   • Acquired absence of other specified parts of digestive tract    • Chronic serous otitis media    • Colitis    • Crohn disease    • Diarrhea, unspecified    • Gastroesophageal reflux disease 10/11/2023   • Headache    • Hypothyroidism    • Lumbar herniated disc    • Pain in joints of unspecified hand    • Post-menopause    • Sciatica associated with disorder of lumbar spine         Past Surgical History:   Procedure Laterality Date   • ABDOMINOPLASTY  2012    mini   • BUNIONECTOMY Bilateral 2010   • CHOLECYSTECTOMY  2021   • COLONOSCOPY  10/2021   • FOOT FRACTURE SURGERY Left     great toe   • HEMORRHOIDECTOMY  2010   • RHINOPLASTY  1995   • STOMACH SURGERY     • TOE SURGERY     • TONSILLECTOMY     • WISDOM TOOTH EXTRACTION          Current Outpatient Medications on File Prior to Visit   Medication Sig Dispense Refill   • DULoxetine (CYMBALTA) 20 MG capsule Take 1 capsule by mouth Daily for 30 days. 30 capsule 0   • famotidine (PEPCID) 40 MG tablet 14 Tab, 0 Refill(s), 0 Refill(s)     • hydrOXYzine (ATARAX) 25 MG tablet Take 1 tablet by mouth every night at bedtime for 30 days. 30 tablet 0   • rivaroxaban (XARELTO) 20 MG tablet Take 1 tablet by mouth Daily. 33 tablet 0   • cyclobenzaprine (FLEXERIL) 10 MG tablet Take 1 tablet by mouth 3 (Three) Times a Day As Needed for Muscle Spasms. (Patient not taking: Reported on 12/13/2023)     • estradiol (ESTRACE) 0.1 MG/GM vaginal cream Insert 2 applicators into the vagina Daily. (Patient not taking: Reported on 12/13/2023)     • Progesterone (PROMETRIUM) 200 MG capsule Take 1 capsule by mouth Daily. (Patient not taking: Reported on 12/13/2023) 90 capsule 3     No current facility-administered medications on file prior to visit.        ALLERGIES:  No Known Allergies  "    Social History     Socioeconomic History   • Marital status: Single   • Number of children: 2   • Years of education: HIGH SCHOOL   Tobacco Use   • Smoking status: Never   • Smokeless tobacco: Never   Vaping Use   • Vaping Use: Never used   Substance and Sexual Activity   • Alcohol use: Yes     Alcohol/week: 6.0 standard drinks of alcohol     Types: 4 Glasses of wine, 2 Drinks containing 0.5 oz of alcohol per week   • Drug use: Yes     Types: Marijuana     Comment: 4-5 times a year   • Sexual activity: Yes     Partners: Male     Birth control/protection: None, Post-menopausal     Comment: Partner had a vasectomy        Family History   Problem Relation Age of Onset   • Diabetes Mother    • Arthritis Mother    • Stroke Mother    • Arthritis Father    • Cancer Father         colon   • Stroke Father    • Colon cancer Father    • Diabetes Father    • Hypertension Sister    • Cancer Brother         prostate   • Stroke Brother         x 2   • Hypertension Brother    • Colon polyps Brother    • SARA disease Brother    • Irritable bowel syndrome Brother         Brothers   • Prostate cancer Brother    • Hypertension Brother    • Stroke Brother    • Prostate cancer Brother    • Stroke Brother    • Hypertension Brother    • Stroke Maternal Grandmother    • Diabetes Maternal Grandmother    • Colon cancer Maternal Grandmother    • Stroke Paternal Grandmother    • Hypertension Other         Review of Systems   Constitutional: Negative.    HENT: Negative.     Respiratory: Negative.     Cardiovascular: Negative.    Gastrointestinal: Negative.    Musculoskeletal:  Positive for back pain.   Skin: Negative.    Allergic/Immunologic: Negative.    Hematological: Negative.    Psychiatric/Behavioral:  The patient is nervous/anxious.           Objective    Vitals:    12/13/23 1113   BP: 131/79   Pulse: 67   Resp: 16   Temp: 97.8 °F (36.6 °C)   TempSrc: Temporal   SpO2: 98%   Weight: 63.4 kg (139 lb 12.8 oz)   Height: 167.6 cm (65.98\") "   PainSc: 0-No pain         12/13/2023    11:13 AM   Current Status   ECOG score 0       Physical Exam    CONSTITUTIONAL: pleasant well-developed adult woman  HEENT: no icterus, no thrush, moist membranes  LYMPH: no cervical or supraclavicular lad  CV: RRR, S1S2, no murmur  RESP: cta bilat, no wheezing, no rales  GI: soft, non-tender, no splenomegaly, +bs  MUSC: no edema, lumbar scar healing well, no lower extremity edema  NEURO: alert and oriented x3, normal strength  PSYCH: normal mood and affect      RECENT LABS:  Hematology WBC   Date Value Ref Range Status   11/02/2023 8.7 3.4 - 10.8 x10E3/uL Final     RBC   Date Value Ref Range Status   11/02/2023 4.26 3.77 - 5.28 x10E6/uL Final     Hemoglobin   Date Value Ref Range Status   11/02/2023 13.2 11.1 - 15.9 g/dL Final     Hematocrit   Date Value Ref Range Status   11/02/2023 39.3 34.0 - 46.6 % Final     Platelets   Date Value Ref Range Status   11/02/2023 411 150 - 450 x10E3/uL Final        Lab Results   Component Value Date    GLUCOSE 86 11/02/2023    BUN 10 11/02/2023    CREATININE 1.03 (H) 11/02/2023    EGFRRESULT 63 11/02/2023    BCR 10 11/02/2023    K 5.4 (H) 11/02/2023    CO2 21 11/02/2023    CALCIUM 9.8 11/02/2023    PROTENTOTREF 6.8 11/02/2023    ALBUMIN 4.6 11/02/2023    BILITOT 0.6 11/02/2023    AST 26 11/02/2023    ALT 26 11/02/2023     Venous duplex right lower extremity 10/19/2023-acute DVT right posterior tibial vein      Assessment & Plan    This is a pleasant 58-year-old woman who experienced a postoperative calf vein thrombosis of the right lower extremity documented by Doppler ultrasound 10/19/2023 just a few days post op lumbar laminectomy/hospitalization.  She has completed 2 months of anticoagulation.  She is now asymptomatic.  She has no personal or family history of DVT so I do not think hypercoagulable evaluation is required.  I am going to set her up for a repeat Doppler ultrasound to make sure the thrombosis has either resolved or formed  chronic thrombosis but unless there is subacute thrombus present I think the patient can safely discontinue anticoagulation at this time.  Of note the patient takes bioidentical hormone replacement therapy and she will need to discuss carefully with the prescribing physician pros and cons of continuing this given her recent DVT.

## 2023-12-15 ENCOUNTER — TELEPHONE (OUTPATIENT)
Dept: ONCOLOGY | Facility: CLINIC | Age: 58
End: 2023-12-15
Payer: MEDICAID

## 2023-12-15 NOTE — TELEPHONE ENCOUNTER
Her doppler was completely negative so I think okay to stop AC.     Called the patient to let her know the above per Dr. Jenkins and she was very appreciative and v/u.

## 2023-12-15 NOTE — TELEPHONE ENCOUNTER
Caller: Demi Leija    Relationship: Self    Best call back number: [phone]     What is the best time to reach you: ANYTIME    Who are you requesting to speak with (clinical staff, provider,  specific staff member): CLINICAL    What was the call regarding: PT IS WANTING TO KNOW IF IT IS OKAY TO COME OFF OF THE BLOOD THINNER SO ABRUPTLY?    ALSO THE PT HAD HER CT DONE AND IS JUST WANTING TO MAKE SURE THAT WE RECEIVED EVERYTHING THAT WE NEED. THE PT DOES HAVE A CD COPY IF NEEDED.     Is it okay if the provider responds through MyChart: NO - PLEASE CALL BACK TO ADVISE.

## 2023-12-15 NOTE — TELEPHONE ENCOUNTER
Called the patient to let her know that we did get the doppler report. She stated that she wanted to make sure that she was okay to stop the anticoagulation therapy as this makes her very nervous. I let her know I would double check with Dr. Jenkins and get back to her and she v/u.

## 2024-01-22 RX ORDER — HYDROXYZINE HYDROCHLORIDE 25 MG/1
25 TABLET, FILM COATED ORAL
Qty: 30 TABLET | Refills: 0 | Status: SHIPPED | OUTPATIENT
Start: 2024-01-22

## 2024-01-24 ENCOUNTER — OFFICE VISIT (OUTPATIENT)
Dept: FAMILY MEDICINE CLINIC | Facility: CLINIC | Age: 59
End: 2024-01-24
Payer: MEDICAID

## 2024-01-24 VITALS
HEIGHT: 65 IN | OXYGEN SATURATION: 99 % | HEART RATE: 76 BPM | WEIGHT: 139 LBS | BODY MASS INDEX: 23.16 KG/M2 | DIASTOLIC BLOOD PRESSURE: 88 MMHG | SYSTOLIC BLOOD PRESSURE: 110 MMHG

## 2024-01-24 DIAGNOSIS — R29.898 NECK TIGHTNESS: ICD-10-CM

## 2024-01-24 DIAGNOSIS — M25.521 RIGHT ELBOW PAIN: Primary | ICD-10-CM

## 2024-01-24 DIAGNOSIS — R39.9 UTI SYMPTOMS: ICD-10-CM

## 2024-01-24 LAB
BILIRUB BLD-MCNC: NEGATIVE MG/DL
CLARITY, POC: CLEAR
COLOR UR: YELLOW
GLUCOSE UR STRIP-MCNC: NEGATIVE MG/DL
KETONES UR QL: NEGATIVE
LEUKOCYTE EST, POC: NEGATIVE
NITRITE UR-MCNC: NEGATIVE MG/ML
PH UR: 5.5 [PH] (ref 5–8)
PROT UR STRIP-MCNC: ABNORMAL MG/DL
RBC # UR STRIP: ABNORMAL /UL
SP GR UR: 1.03 (ref 1–1.03)
UROBILINOGEN UR QL: NORMAL

## 2024-01-24 RX ORDER — DULOXETIN HYDROCHLORIDE 20 MG/1
20 CAPSULE, DELAYED RELEASE ORAL DAILY
Qty: 30 CAPSULE | Refills: 0 | Status: SHIPPED | OUTPATIENT
Start: 2024-01-24 | End: 2024-02-23
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
## 2024-01-26 ENCOUNTER — TELEPHONE (OUTPATIENT)
Dept: FAMILY MEDICINE CLINIC | Facility: CLINIC | Age: 59
End: 2024-01-26

## 2024-01-26 ENCOUNTER — OFFICE VISIT (OUTPATIENT)
Dept: ORTHOPEDIC SURGERY | Facility: CLINIC | Age: 59
End: 2024-01-26
Payer: MEDICAID

## 2024-01-26 VITALS — TEMPERATURE: 98.7 F | BODY MASS INDEX: 22.07 KG/M2 | WEIGHT: 137.3 LBS | HEIGHT: 66 IN

## 2024-01-26 DIAGNOSIS — M77.11 LATERAL EPICONDYLITIS OF RIGHT ELBOW: ICD-10-CM

## 2024-01-26 DIAGNOSIS — M25.521 RIGHT ELBOW PAIN: Primary | ICD-10-CM

## 2024-01-26 RX ORDER — SULFAMETHOXAZOLE AND TRIMETHOPRIM 800; 160 MG/1; MG/1
1 TABLET ORAL 2 TIMES DAILY
Qty: 10 TABLET | Refills: 0 | Status: SHIPPED | OUTPATIENT
Start: 2024-01-26 | End: 2024-01-31

## 2024-01-26 RX ADMIN — METHYLPREDNISOLONE ACETATE 80 MG: 80 INJECTION, SUSPENSION INTRA-ARTICULAR; INTRALESIONAL; INTRAMUSCULAR; SOFT TISSUE at 14:38

## 2024-01-26 RX ADMIN — LIDOCAINE HYDROCHLORIDE 2 ML: 20 INJECTION, SOLUTION EPIDURAL; INFILTRATION; INTRACAUDAL; PERINEURAL at 14:38

## 2024-01-26 NOTE — TELEPHONE ENCOUNTER
Provider:JOJO MANZO    Caller: KARENA RICHARDSON    Relationship to Patient: PATIENT    Pharmacy: [facility] [id number] - [facility] [address] AT [facility] [address] - [phone]  - [phone]     Phone Number: [phone]    Reason for Call: PATIENT IS CALLING TO CHECK THE STATUS OF THE URINE CULTURE.  SHE STATES HER SYMPTOMS ARE GETTING WORSE.    PLEASE ADVISE.
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
## 2024-01-27 LAB
BACTERIA UR CULT: ABNORMAL
BACTERIA UR CULT: ABNORMAL
OTHER ANTIBIOTIC SUSC ISLT: ABNORMAL

## 2024-01-28 RX ORDER — LIDOCAINE HYDROCHLORIDE 20 MG/ML
2 INJECTION, SOLUTION EPIDURAL; INFILTRATION; INTRACAUDAL; PERINEURAL
Status: COMPLETED | OUTPATIENT
Start: 2024-01-26 | End: 2024-01-26

## 2024-01-28 RX ORDER — METHYLPREDNISOLONE ACETATE 80 MG/ML
80 INJECTION, SUSPENSION INTRA-ARTICULAR; INTRALESIONAL; INTRAMUSCULAR; SOFT TISSUE
Status: COMPLETED | OUTPATIENT
Start: 2024-01-26 | End: 2024-01-26

## 2024-01-29 ENCOUNTER — TELEPHONE (OUTPATIENT)
Dept: FAMILY MEDICINE CLINIC | Facility: CLINIC | Age: 59
End: 2024-01-29
Payer: MEDICAID

## 2024-01-29 RX ORDER — CYCLOBENZAPRINE HCL 10 MG
10 TABLET ORAL 3 TIMES DAILY PRN
Qty: 60 TABLET | Refills: 0 | Status: SHIPPED | OUTPATIENT
Start: 2024-01-29

## 2024-01-29 RX ORDER — CEPHALEXIN 500 MG/1
500 CAPSULE ORAL 2 TIMES DAILY
Qty: 6 CAPSULE | Refills: 0 | Status: SHIPPED | OUTPATIENT
Start: 2024-01-29 | End: 2024-02-01

## 2024-01-29 NOTE — TELEPHONE ENCOUNTER
Patient stated that she is still not feeling better. Lots of pressure when standing. Made an appointment for 1/30/2024.

## 2024-01-30 DIAGNOSIS — M77.11 LATERAL EPICONDYLITIS OF RIGHT ELBOW: Primary | ICD-10-CM

## 2024-02-13 ENCOUNTER — TELEPHONE (OUTPATIENT)
Dept: FAMILY MEDICINE CLINIC | Facility: CLINIC | Age: 59
End: 2024-02-13

## 2024-02-13 DIAGNOSIS — L90.5 SCARS: Primary | ICD-10-CM

## 2024-02-13 NOTE — TELEPHONE ENCOUNTER
PATIENT CALLED AND STATES THE STITCH IN HER BACK HAS NOT WORKED ITS WAY OUT. SHE IS REQUESTING TO SEE A DERMATOLOGIST      PLEASE CALL [phone]    SHE WAS SEEN BY THE SURGEON AND WAS ADVISED TO SEE DERMATOLOGIST OR  PLASTIC SURGEON

## 2024-02-15 ENCOUNTER — TELEPHONE (OUTPATIENT)
Dept: FAMILY MEDICINE CLINIC | Facility: CLINIC | Age: 59
End: 2024-02-15
Payer: MEDICAID

## 2024-02-23 NOTE — PROGRESS NOTES
Patient: Demi Leija  YOB: 1965  Date of Service: 2/23/2024    Chief Complaints:  right elbow pain    Subjective:    History of Present Illness: Pt is seen in the office today with complaints of right elbow pain I last saw her and she had lateral epicondylitis she said that it is feeling much better she is about 80% better still having some pain medially her thumb is better she did get the Rx alternative cream and that has helped.        Allergies:   Allergies   Allergen Reactions    Nylon Unknown - Low Severity     Nylon stitches   will not dissolve       Medications:   Home Medications:  Current Outpatient Medications on File Prior to Visit   Medication Sig    cyclobenzaprine (FLEXERIL) 10 MG tablet Take 1 tablet by mouth 3 (Three) Times a Day As Needed for Muscle Spasms.    DULoxetine (CYMBALTA) 20 MG capsule Take 1 capsule by mouth Daily for 30 days.    estradiol (ESTRACE) 0.1 MG/GM vaginal cream Insert 2 applicators into the vagina Daily.    famotidine (PEPCID) 40 MG tablet 14 Tab, 0 Refill(s), 0 Refill(s)    hydrOXYzine (ATARAX) 25 MG tablet TAKE ONE TABLET BY MOUTH EVERY NIGHT AT BEDTIME    Ibuprofen 3 %, Gabapentin 10 %, Baclofen 2 %, lidocaine 4 % Apply 1-2 g topically to the appropriate area as directed 3 (Three) to 4 (Four) times daily.    Progesterone (PROMETRIUM) 200 MG capsule Take 1 capsule by mouth Daily.    rivaroxaban (XARELTO) 20 MG tablet Take 1 tablet by mouth Daily. (Patient not taking: Reported on 1/26/2024)     No current facility-administered medications on file prior to visit.     Current Medications:  Scheduled Meds:  Continuous Infusions:No current facility-administered medications for this visit.    PRN Meds:.    I have reviewed the patient's medical history in detail and updated the computerized patient record.  Review and summarization of old records include:    Past Medical History:   Diagnosis Date    Acquired absence of other specified parts of digestive tract      Asthma     Chronic serous otitis media     Colitis     Crohn disease     Diarrhea, unspecified     Gastroesophageal reflux disease 10/11/2023    Headache     Hypothyroidism     Lumbar herniated disc     Pain in joints of unspecified hand     Post-menopause     Sciatica associated with disorder of lumbar spine     Tattoos         Past Surgical History:   Procedure Laterality Date    ABDOMINOPLASTY  2012    mini    BUNIONECTOMY Bilateral 2010    CHOLECYSTECTOMY  2021    COLONOSCOPY  10/2021    FOOT FRACTURE SURGERY Left     great toe    HEMORRHOIDECTOMY  2010    NOSE SURGERY      RHINOPLASTY  1995    STOMACH SURGERY      TOE SURGERY      TONSILLECTOMY      WISDOM TOOTH EXTRACTION          Social History     Occupational History    Occupation: REALTOR   Tobacco Use    Smoking status: Never    Smokeless tobacco: Never   Vaping Use    Vaping Use: Never used   Substance and Sexual Activity    Alcohol use: Yes     Alcohol/week: 6.0 standard drinks of alcohol     Types: 4 Glasses of wine, 2 Drinks containing 0.5 oz of alcohol per week    Drug use: Yes     Types: Marijuana     Comment: 4-5 times a year    Sexual activity: Yes     Partners: Male     Birth control/protection: None, Post-menopausal     Comment: Partner had a vasectomy      Social History     Social History Narrative    Not on file        Family History   Problem Relation Age of Onset    Diabetes Mother     Arthritis Mother     Stroke Mother     Arthritis Father     Cancer Father         colon    Stroke Father     Colon cancer Father     Diabetes Father     Hypertension Sister     Cancer Brother         prostate    Stroke Brother         x 2    Hypertension Brother     Colon polyps Brother     SARA disease Brother     Irritable bowel syndrome Brother         Brothers    Prostate cancer Brother     Hypertension Brother     Stroke Brother     Prostate cancer Brother     Stroke Brother     Hypertension Brother     Stroke Maternal Grandmother     Diabetes  Maternal Grandmother     Colon cancer Maternal Grandmother     Stroke Paternal Grandmother     Hypertension Other        ROS: 14 point review of systems was performed and was negative except for documented findings in HPI and today's encounter.     Allergies:   Allergies   Allergen Reactions    Nylon Unknown - Low Severity     Nylon stitches   will not dissolve     Constitutional:  Denies fever, shaking or chills   Eyes:  Denies change in visual acuity   HENT:  Denies nasal congestion or sore throat   Respiratory:  Denies cough or shortness of breath   Cardiovascular:  Denies chest pain or severe LE edema   GI:  Denies abdominal pain, nausea, vomiting, bloody stools or diarrhea   Musculoskeletal:  Numbness, tingling, or loss of motor function only as noted above in history of present illness.  : Denies painful urination or hematuria  Integument:  Denies rash, lesion or ulceration   Neurologic:  Denies headache or focal weakness  Endocrine:  Denies lymphadenopathy  Psych:  Denies confusion or change in mental status   Hem:  Denies active bleeding      Physical Exam: 58 y.o. female  Wt Readings from Last 3 Encounters:   01/26/24 62.3 kg (137 lb 4.8 oz)   01/24/24 63 kg (139 lb)   12/13/23 63.4 kg (139 lb 12.8 oz)       There is no height or weight on file to calculate BMI.    There were no vitals filed for this visit.  Vital signs reviewed.   General Appearance:    Alert, cooperative, in no acute distress                    Ortho exam    Exam of her right elbow no effusion no redness she does have some tenderness medially she has pain with resisted wrist flexion and forearm pronation she has a mildly positive Tinel's no real tenderness laterally             Assessment:   Right elbow pain I think she had an element of lateral and medial epicondylitis the lateral is much better we talked about injection medially she wants to hold off on that which I think is reasonable she will continue her topical anti-inflammatory I  showed her how to stretch both groups of muscles we will start her in physical therapy I will have her make an appointment for 1 month if she still symptomatic she will get in here and we can inject the medial side if she is asymptomatic she may cancel plan:   Follow up as indicated.  Ice, elevate, and rest as needed.  Discussed conservative measures of pain control including ice, bracing.  Also talked about the importance of strengthening     Farzaneh Lira M.D.

## 2024-02-26 ENCOUNTER — OFFICE VISIT (OUTPATIENT)
Dept: ORTHOPEDIC SURGERY | Facility: CLINIC | Age: 59
End: 2024-02-26
Payer: MEDICAID

## 2024-02-26 VITALS — BODY MASS INDEX: 22.69 KG/M2 | HEIGHT: 66 IN | WEIGHT: 141.2 LBS | TEMPERATURE: 98.6 F

## 2024-02-26 DIAGNOSIS — M77.01 GOLFERS ELBOW OF RIGHT UPPER EXTREMITY: ICD-10-CM

## 2024-02-26 DIAGNOSIS — M77.11 LATERAL EPICONDYLITIS OF RIGHT ELBOW: Primary | ICD-10-CM

## 2024-02-26 PROCEDURE — 99213 OFFICE O/P EST LOW 20 MIN: CPT | Performed by: ORTHOPAEDIC SURGERY

## 2024-02-26 PROCEDURE — 1160F RVW MEDS BY RX/DR IN RCRD: CPT | Performed by: ORTHOPAEDIC SURGERY

## 2024-02-26 PROCEDURE — 1159F MED LIST DOCD IN RCRD: CPT | Performed by: ORTHOPAEDIC SURGERY

## 2024-02-26 RX ORDER — AMANTADINE HYDROCHLORIDE 100 MG/1
TABLET ORAL
COMMUNITY
Start: 2024-02-09

## 2024-03-06 ENCOUNTER — OFFICE VISIT (OUTPATIENT)
Dept: FAMILY MEDICINE CLINIC | Facility: CLINIC | Age: 59
End: 2024-03-06
Payer: MEDICAID

## 2024-03-06 VITALS
OXYGEN SATURATION: 97 % | DIASTOLIC BLOOD PRESSURE: 76 MMHG | HEIGHT: 66 IN | HEART RATE: 73 BPM | SYSTOLIC BLOOD PRESSURE: 118 MMHG | RESPIRATION RATE: 18 BRPM | BODY MASS INDEX: 22.18 KG/M2 | WEIGHT: 138 LBS

## 2024-03-06 DIAGNOSIS — Z87.09 HISTORY OF ASTHMA: ICD-10-CM

## 2024-03-06 DIAGNOSIS — R30.0 DYSURIA: ICD-10-CM

## 2024-03-06 DIAGNOSIS — J06.9 VIRAL UPPER RESPIRATORY TRACT INFECTION: Primary | ICD-10-CM

## 2024-03-06 LAB
EXPIRATION DATE: NORMAL
FLUAV AG UPPER RESP QL IA.RAPID: NOT DETECTED
FLUBV AG UPPER RESP QL IA.RAPID: NOT DETECTED
INTERNAL CONTROL: NORMAL
Lab: NORMAL
SARS-COV-2 AG UPPER RESP QL IA.RAPID: NOT DETECTED

## 2024-03-06 PROCEDURE — 87428 SARSCOV & INF VIR A&B AG IA: CPT | Performed by: NURSE PRACTITIONER

## 2024-03-06 PROCEDURE — 1159F MED LIST DOCD IN RCRD: CPT | Performed by: NURSE PRACTITIONER

## 2024-03-06 PROCEDURE — 99213 OFFICE O/P EST LOW 20 MIN: CPT | Performed by: NURSE PRACTITIONER

## 2024-03-06 PROCEDURE — 1160F RVW MEDS BY RX/DR IN RCRD: CPT | Performed by: NURSE PRACTITIONER

## 2024-03-06 RX ORDER — FLUTICASONE PROPIONATE 50 MCG
2 SPRAY, SUSPENSION (ML) NASAL DAILY
Qty: 18.2 ML | Refills: 5 | Status: SHIPPED | OUTPATIENT
Start: 2024-03-06

## 2024-03-06 RX ORDER — BENZONATATE 100 MG/1
100 CAPSULE ORAL 3 TIMES DAILY PRN
Qty: 30 CAPSULE | Refills: 0 | Status: SHIPPED | OUTPATIENT
Start: 2024-03-06 | End: 2024-03-16

## 2024-03-06 RX ORDER — ALBUTEROL SULFATE 90 UG/1
2 AEROSOL, METERED RESPIRATORY (INHALATION) EVERY 4 HOURS PRN
Qty: 18 G | Refills: 3 | Status: SHIPPED | OUTPATIENT
Start: 2024-03-06

## 2024-03-06 NOTE — PROGRESS NOTES
Subjective   Demi Leija is a 58 y.o. female.     History of Present Illness     Acute sick visit    R ear pain, lymph node , congestion, cough x 2 days. She started allergy meds. No fever. Fiance was sick last week.     Acute dysuria and urgency x 2 days, no fever. Started azo. She has history of renal stone. She is afebrile.    The following portions of the patient's history were reviewed and updated as appropriate: allergies, current medications, past family history, past medical history, past social history, past surgical history and problem list.    Review of Systems      Current Outpatient Medications:     albuterol sulfate  (90 Base) MCG/ACT inhaler, Inhale 2 puffs Every 4 (Four) Hours As Needed for Wheezing., Disp: 18 g, Rfl: 3    amantadine (SYMMETREL) 100 MG tablet, , Disp: , Rfl:     benzonatate (Tessalon Perles) 100 MG capsule, Take 1 capsule by mouth 3 (Three) Times a Day As Needed for Cough for up to 10 days., Disp: 30 capsule, Rfl: 0    DULoxetine (CYMBALTA) 20 MG capsule, Take 1 capsule by mouth Daily for 30 days., Disp: 30 capsule, Rfl: 0    estradiol (ESTRACE) 0.1 MG/GM vaginal cream, Insert 2 applicators into the vagina Daily., Disp: , Rfl:     famotidine (PEPCID) 40 MG tablet, 14 Tab, 0 Refill(s), 0 Refill(s), Disp: , Rfl:     fluticasone (FLONASE) 50 MCG/ACT nasal spray, 2 sprays into the nostril(s) as directed by provider Daily., Disp: 18.2 mL, Rfl: 5    Ibuprofen 3 %, Gabapentin 10 %, Baclofen 2 %, lidocaine 4 %, Apply 1-2 g topically to the appropriate area as directed 3 (Three) to 4 (Four) times daily., Disp: 90 g, Rfl: 1    Progesterone (PROMETRIUM) 200 MG capsule, Take 1 capsule by mouth Daily., Disp: 90 capsule, Rfl: 3    Objective   Physical Exam  Vitals reviewed.   Constitutional:       Appearance: Normal appearance.   HENT:      Head: Normocephalic.      Right Ear: A middle ear effusion is present.      Left Ear: Tympanic membrane normal.   Pulmonary:      Breath sounds:  Decreased breath sounds present. No wheezing or rhonchi.      Comments: Tight throughout bilat bases    Neurological:      Mental Status: She is alert.         Vitals:    03/06/24 0926   BP: 118/76   Pulse: 73   Resp: 18   SpO2: 97%     Body mass index is 22.27 kg/m².    Procedures    TSH   Date Value Ref Range Status   12/08/2022 2.430 0.270 - 4.200 uIU/mL Final     Hemoglobin A1C   Date Value Ref Range Status   04/18/2023 5.5 4.8 - 5.6 % Final     Comment:              Prediabetes: 5.7 - 6.4           Diabetes: >6.4           Glycemic control for adults with diabetes: <7.0                     Assessment & Plan   Problems Addressed this Visit    None  Visit Diagnoses       Viral upper respiratory tract infection    -  Primary    Relevant Orders    POCT SARS-CoV-2 + Flu Antigen MARIE (Completed)    Dysuria        Relevant Orders    Urine Culture - Urine, Urine, Clean Catch    History of asthma              Diagnoses         Codes Comments    Viral upper respiratory tract infection    -  Primary ICD-10-CM: J06.9  ICD-9-CM: 465.9     Dysuria     ICD-10-CM: R30.0  ICD-9-CM: 788.1     History of asthma     ICD-10-CM: Z87.09  ICD-9-CM: V12.69           Orders Placed This Encounter   Procedures    Urine Culture - Urine, Urine, Clean Catch     Order Specific Question:   Release to patient     Answer:   Routine Release [[id number]]    POCT SARS-CoV-2 + Flu Antigen MARIE     Order Specific Question:   Release to patient     Answer:   Routine Release [[id number]]       Negative flu/covid  URI- tessalon 100 tid, albuterol 2 puffs q4h prn, rest, hydrate, allergy meds and mucinex as needed    Dysuria -send culture, push fluids, call if fever or worsening., ER is unable to pass urine            Education provided in AVS   Return if symptoms worsen or fail to improve.

## 2024-03-07 RX ORDER — CEPHALEXIN 500 MG/1
500 CAPSULE ORAL 2 TIMES DAILY
Qty: 10 CAPSULE | Refills: 0 | Status: SHIPPED | OUTPATIENT
Start: 2024-03-07 | End: 2024-03-12

## 2024-03-08 ENCOUNTER — TELEPHONE (OUTPATIENT)
Dept: FAMILY MEDICINE CLINIC | Facility: CLINIC | Age: 59
End: 2024-03-08
Payer: MEDICAID

## 2024-03-08 NOTE — TELEPHONE ENCOUNTER
Caller: Demi Leija    Relationship to patient: Self    Best call back number: [phone]     Patient is needing: PATIENT CALLING FOR URINE RESULTS. PATIENT STATES SHE WAS GIVEN KEFLEX FOR HER WORSENING SYMPTOMS OF HER COLD SYMPTOMS, BUT WANTED TO MAKE SURE THIS MEDICATION WOULD COVER HER IF SHE HAS A UTI. PLEASE CALL AND ADVISE.

## 2024-03-09 LAB
BACTERIA UR CULT: ABNORMAL
BACTERIA UR CULT: ABNORMAL
OTHER ANTIBIOTIC SUSC ISLT: ABNORMAL

## 2024-03-20 ENCOUNTER — CLINICAL SUPPORT (OUTPATIENT)
Dept: FAMILY MEDICINE CLINIC | Facility: CLINIC | Age: 59
End: 2024-03-20
Payer: MEDICAID

## 2024-03-20 DIAGNOSIS — R39.9 UTI SYMPTOMS: Primary | ICD-10-CM

## 2024-03-20 LAB
BILIRUB BLD-MCNC: NEGATIVE MG/DL
CLARITY, POC: CLEAR
COLOR UR: YELLOW
GLUCOSE UR STRIP-MCNC: NEGATIVE MG/DL
KETONES UR QL: NEGATIVE
LEUKOCYTE EST, POC: ABNORMAL
NITRITE UR-MCNC: NEGATIVE MG/ML
PH UR: 6 [PH] (ref 5–8)
PROT UR STRIP-MCNC: ABNORMAL MG/DL
RBC # UR STRIP: ABNORMAL /UL
SP GR UR: 1.01 (ref 1–1.03)
UROBILINOGEN UR QL: NORMAL

## 2024-03-20 RX ORDER — NITROFURANTOIN MACROCRYSTALS 100 MG/1
100 CAPSULE ORAL 4 TIMES DAILY
Qty: 20 CAPSULE | Refills: 0 | Status: SHIPPED | OUTPATIENT
Start: 2024-03-20 | End: 2024-03-25

## 2024-03-21 RX ORDER — HYDROXYZINE HYDROCHLORIDE 25 MG/1
25 TABLET, FILM COATED ORAL
Qty: 90 TABLET | Refills: 1 | Status: SHIPPED | OUTPATIENT
Start: 2024-03-21

## 2024-03-21 NOTE — TELEPHONE ENCOUNTER
Caller: Demi Leija    Relationship to patient: Self    Best call back number: [phone]    Patient is needing: PLEASE ADVISE, PATIENT IS OUT OF THIS MEDICATION

## 2024-03-22 ENCOUNTER — TELEPHONE (OUTPATIENT)
Dept: FAMILY MEDICINE CLINIC | Facility: CLINIC | Age: 59
End: 2024-03-22
Payer: MEDICAID

## 2024-03-22 ENCOUNTER — TELEPHONE (OUTPATIENT)
Dept: ORTHOPEDIC SURGERY | Facility: CLINIC | Age: 59
End: 2024-03-22

## 2024-03-22 DIAGNOSIS — M77.10 LATERAL EPICONDYLITIS, UNSPECIFIED LATERALITY: Primary | ICD-10-CM

## 2024-03-22 NOTE — TELEPHONE ENCOUNTER
Provider: DR PHILLIPS    Caller: KARENA RICHARDSON    Relationship to Patient: SELF    Phone Number: [phone]    Reason for Call: PATIENT WANTS TO KNOW IF DR PHILLIPS CAN ORDER AN MRI OF HER RIGHT ELBOW.

## 2024-03-22 NOTE — TELEPHONE ENCOUNTER
Pt called requesting urine cx results. Told her to continue with what Raven sent in on 3/20. Pt agreed, to call if symptoms don't get better by next week

## 2024-03-23 LAB
BACTERIA UR CULT: ABNORMAL
BACTERIA UR CULT: ABNORMAL
OTHER ANTIBIOTIC SUSC ISLT: ABNORMAL

## 2024-04-11 ENCOUNTER — CLINICAL SUPPORT (OUTPATIENT)
Dept: FAMILY MEDICINE CLINIC | Facility: CLINIC | Age: 59
End: 2024-04-11
Payer: MEDICAID

## 2024-04-11 DIAGNOSIS — R39.9 UTI SYMPTOMS: Primary | ICD-10-CM

## 2024-04-11 LAB
BILIRUB BLD-MCNC: NEGATIVE MG/DL
CLARITY, POC: ABNORMAL
COLOR UR: YELLOW
GLUCOSE UR STRIP-MCNC: NEGATIVE MG/DL
KETONES UR QL: NEGATIVE
LEUKOCYTE EST, POC: ABNORMAL
NITRITE UR-MCNC: NEGATIVE MG/ML
PH UR: 6 [PH] (ref 5–8)
PROT UR STRIP-MCNC: ABNORMAL MG/DL
RBC # UR STRIP: ABNORMAL /UL
SP GR UR: 1.01 (ref 1–1.03)
UROBILINOGEN UR QL: NORMAL

## 2024-04-11 PROCEDURE — 81002 URINALYSIS NONAUTO W/O SCOPE: CPT | Performed by: NURSE PRACTITIONER

## 2024-04-12 ENCOUNTER — TELEPHONE (OUTPATIENT)
Dept: FAMILY MEDICINE CLINIC | Facility: CLINIC | Age: 59
End: 2024-04-12

## 2024-04-12 DIAGNOSIS — R39.9 UTI SYMPTOMS: Primary | ICD-10-CM

## 2024-04-12 RX ORDER — CIPROFLOXACIN 500 MG/1
TABLET, FILM COATED ORAL
Qty: 3 TABLET | Refills: 0 | Status: SHIPPED | OUTPATIENT
Start: 2024-04-12

## 2024-04-12 RX ORDER — FLUCONAZOLE 150 MG/1
150 TABLET ORAL ONCE
Qty: 1 TABLET | Refills: 1 | Status: SHIPPED | OUTPATIENT
Start: 2024-04-12 | End: 2024-04-12

## 2024-04-12 RX ORDER — CIPROFLOXACIN 500 MG/1
500 TABLET, FILM COATED ORAL DAILY
Qty: 3 TABLET | Refills: 0 | Status: SHIPPED | OUTPATIENT
Start: 2024-04-12

## 2024-04-12 NOTE — TELEPHONE ENCOUNTER
Caller: Demi Leija    Relationship: Self    Best call back number:    [phone] (Mobile)       What medication are you requesting: DIFLUCAN (2)    What are your current symptoms: YEAST INFECTION    How long have you been experiencing symptoms:      Have you had these symptoms before:    [] Yes  [] No    Have you been treated for these symptoms before:   [] Yes  [] No    If a prescription is needed, what is your preferred pharmacy and phone number:  [facility] [id number] - [facility] [address] AT [facility] [address] - [phone]  - [phone] FX     Additional notes: PATIENT CALLED TO REQUEST A DIFLUCAN FOR A YEAST INFECTION FROM THE ANTIBIOTIC, THAT SHE WILL START TODAY. PATIENT STATES THAT IS WILL TAKE 2 PILLS TO GET RID OF THE YEAST INFECTION.         THANKS

## 2024-04-15 LAB
BACTERIA UR CULT: ABNORMAL
BACTERIA UR CULT: ABNORMAL
OTHER ANTIBIOTIC SUSC ISLT: ABNORMAL

## 2024-04-16 ENCOUNTER — TELEPHONE (OUTPATIENT)
Dept: ORTHOPEDIC SURGERY | Facility: CLINIC | Age: 59
End: 2024-04-16
Payer: MEDICAID

## 2024-04-16 ENCOUNTER — HOSPITAL ENCOUNTER (OUTPATIENT)
Dept: MRI IMAGING | Facility: HOSPITAL | Age: 59
Discharge: HOME | End: 2024-04-16
Payer: MEDICAID

## 2024-04-16 DIAGNOSIS — F40.240 CLAUSTROPHOBIA: Primary | ICD-10-CM

## 2024-04-16 RX ORDER — DIAZEPAM 5 MG/1
TABLET ORAL
Qty: 2 TABLET | Refills: 0 | Status: SHIPPED | OUTPATIENT
Start: 2024-04-16

## 2024-04-19 ENCOUNTER — TELEPHONE (OUTPATIENT)
Dept: GASTROENTEROLOGY | Facility: CLINIC | Age: 59
End: 2024-04-19
Payer: MEDICAID

## 2024-04-19 NOTE — TELEPHONE ENCOUNTER
Called pt to reschedule 4/23 appt due to pt being a [facility] pt. Pt doesn't want to see Shanika and only wants to see Edna. Is this ok?

## 2024-04-22 NOTE — TELEPHONE ENCOUNTER
Called pt and informed it was ok for her to see Edna tomorrow, 4/23. This was ok-ed by , Melina Valladares.

## 2024-04-24 ENCOUNTER — OFFICE VISIT (OUTPATIENT)
Dept: GASTROENTEROLOGY | Facility: CLINIC | Age: 59
End: 2024-04-24
Payer: MEDICAID

## 2024-04-24 VITALS — BODY MASS INDEX: 22.34 KG/M2 | HEIGHT: 66 IN | TEMPERATURE: 97.9 F | WEIGHT: 139 LBS

## 2024-04-24 DIAGNOSIS — R19.7 DIARRHEA, UNSPECIFIED TYPE: Primary | ICD-10-CM

## 2024-04-24 LAB
ALBUMIN SERPL-MCNC: 4.9 G/DL (ref 3.5–5.2)
ALBUMIN/GLOB SERPL: 2.2 G/DL
ALP SERPL-CCNC: 67 U/L (ref 39–117)
ALT SERPL-CCNC: 18 U/L (ref 1–33)
AST SERPL-CCNC: 18 U/L (ref 1–32)
BILIRUB SERPL-MCNC: 1.4 MG/DL (ref 0–1.2)
BUN SERPL-MCNC: 19 MG/DL (ref 6–20)
BUN/CREAT SERPL: 16.1 (ref 7–25)
CALCIUM SERPL-MCNC: 10.1 MG/DL (ref 8.6–10.5)
CHLORIDE SERPL-SCNC: 102 MMOL/L (ref 98–107)
CO2 SERPL-SCNC: 24.2 MMOL/L (ref 22–29)
CREAT SERPL-MCNC: 1.18 MG/DL (ref 0.57–1)
CRP SERPL-MCNC: 0.5 MG/DL (ref 0–0.5)
EGFRCR SERPLBLD CKD-EPI 2021: 53.6 ML/MIN/1.73
ERYTHROCYTE [DISTWIDTH] IN BLOOD BY AUTOMATED COUNT: 12.7 % (ref 12.3–15.4)
ERYTHROCYTE [SEDIMENTATION RATE] IN BLOOD BY WESTERGREN METHOD: 2 MM/HR (ref 0–30)
GLOBULIN SER CALC-MCNC: 2.2 GM/DL
GLUCOSE SERPL-MCNC: 105 MG/DL (ref 65–99)
HCT VFR BLD AUTO: 46.4 % (ref 34–46.6)
HGB BLD-MCNC: 15.5 G/DL (ref 12–15.9)
MCH RBC QN AUTO: 30.9 PG (ref 26.6–33)
MCHC RBC AUTO-ENTMCNC: 33.4 G/DL (ref 31.5–35.7)
MCV RBC AUTO: 92.6 FL (ref 79–97)
PLATELET # BLD AUTO: 377 10*3/MM3 (ref 140–450)
POTASSIUM SERPL-SCNC: 5.5 MMOL/L (ref 3.5–5.2)
PROT SERPL-MCNC: 7.1 G/DL (ref 6–8.5)
RBC # BLD AUTO: 5.01 10*6/MM3 (ref 3.77–5.28)
SODIUM SERPL-SCNC: 141 MMOL/L (ref 136–145)
TSH SERPL DL<=0.005 MIU/L-ACNC: 1.84 UIU/ML (ref 0.27–4.2)
WBC # BLD AUTO: 9.39 10*3/MM3 (ref 3.4–10.8)

## 2024-04-24 PROCEDURE — 1159F MED LIST DOCD IN RCRD: CPT | Performed by: NURSE PRACTITIONER

## 2024-04-24 PROCEDURE — 1160F RVW MEDS BY RX/DR IN RCRD: CPT | Performed by: NURSE PRACTITIONER

## 2024-04-24 PROCEDURE — 99214 OFFICE O/P EST MOD 30 MIN: CPT | Performed by: NURSE PRACTITIONER

## 2024-04-24 RX ORDER — CHOLESTYRAMINE 4 G/9G
1 POWDER, FOR SUSPENSION ORAL 2 TIMES DAILY WITH MEALS
Qty: 60 PACKET | Refills: 0 | Status: SHIPPED | OUTPATIENT
Start: 2024-04-24 | End: 2024-05-24

## 2024-04-24 NOTE — PROGRESS NOTES
Chief Complaint   Patient presents with    Diarrhea       HPI    Demi Leija is a  58 y.o. female here for a follow up visit for diarrhea.    This patient follows with Dr. William, known to me.    Past medical history of asthma, GERD, thyroid disease, along with collagenous colitis.    Patient reports roughly 4 weeks of diarrhea having up to 6 liquid stools a day.  Associated fecal urgency and lower abdominal cramps.  She has had 4 rounds of antibiotics for recurrent UTIs since January.  She just completed a round of Cipro.  Denies sick contacts.  No rectal pain or rectal bleeding.  Appetite is good.  Weight is stable.  She took Levsin for cramps which she found beneficial.  She tried probiotics for 2 weeks and a bland diet which seem to help but symptoms slowly returned.    No upper GI complaints.    C-scope 2020 - polyp in the rectum.  Nonbleeding terminal hemorrhoids.  Recall 5 years. Path + collagenous colitis.    Past Medical History:   Diagnosis Date    Acquired absence of other specified parts of digestive tract     Asthma     Chronic serous otitis media     Colitis     Crohn disease     Diarrhea, unspecified     Gastroesophageal reflux disease 10/11/2023    Headache     Hypothyroidism     Lumbar herniated disc     Pain in joints of unspecified hand     Post-menopause     Sciatica associated with disorder of lumbar spine     Tattoos        Past Surgical History:   Procedure Laterality Date    ABDOMINOPLASTY  2012    mini    BUNIONECTOMY Bilateral 2010    CHOLECYSTECTOMY  2021    COLONOSCOPY  10/2021    FOOT FRACTURE SURGERY Left     great toe    HEMORRHOIDECTOMY  2010    NOSE SURGERY      RHINOPLASTY  1995    STOMACH SURGERY      TOE SURGERY      TONSILLECTOMY      WISDOM TOOTH EXTRACTION         Scheduled Meds:     Continuous Infusions: No current facility-administered medications for this visit.      PRN Meds:     Allergies   Allergen Reactions    Nylon Unknown - Low Severity     Nylon stitches   will  not dissolve       Social History     Socioeconomic History    Marital status: Single    Number of children: 2    Years of education: HIGH SCHOOL   Tobacco Use    Smoking status: Never    Smokeless tobacco: Never   Vaping Use    Vaping status: Never Used   Substance and Sexual Activity    Alcohol use: Yes     Alcohol/week: 6.0 standard drinks of alcohol     Types: 4 Glasses of wine, 2 Drinks containing 0.5 oz of alcohol per week    Drug use: Yes     Types: Marijuana     Comment: 4-5 times a year    Sexual activity: Yes     Partners: Male     Birth control/protection: None, Post-menopausal     Comment: Partner had a vasectomy       Family History   Problem Relation Age of Onset    Diabetes Mother     Arthritis Mother     Stroke Mother     Arthritis Father     Cancer Father         colon    Stroke Father     Colon cancer Father     Diabetes Father     Hypertension Sister     Cancer Brother         prostate    Stroke Brother         x 2    Hypertension Brother     Colon polyps Brother     SARA disease Brother     Irritable bowel syndrome Brother         Brothers    Prostate cancer Brother     Hypertension Brother     Stroke Brother     Prostate cancer Brother     Stroke Brother     Hypertension Brother     Stroke Maternal Grandmother     Diabetes Maternal Grandmother     Colon cancer Maternal Grandmother     Stroke Paternal Grandmother     Hypertension Other        Review of Systems   Gastrointestinal:  Positive for diarrhea.       Vitals:    04/24/24 0804   Temp: 97.9 °F (36.6 °C)       Physical Exam  Constitutional:       Appearance: She is well-developed.   Abdominal:      General: Bowel sounds are normal. There is no distension.      Palpations: Abdomen is soft. There is no mass.      Tenderness: There is no abdominal tenderness. There is no guarding.      Hernia: No hernia is present.   Skin:     General: Skin is warm and dry.      Capillary Refill: Capillary refill takes less than 2 seconds.   Neurological:       Mental Status: She is alert and oriented to person, place, and time.   Psychiatric:         Behavior: Behavior normal.     Assessment    Diagnoses and all orders for this visit:    1. Diarrhea, unspecified type (Primary)  -     CBC (No Diff)  -     Comprehensive Metabolic Panel  -     TSH  -     C-reactive Protein  -     Sedimentation Rate  -     Celiac Comprehensive Panel    Other orders  -     cholestyramine (Questran) 4 g packet; Take 1 packet by mouth 2 (Two) Times a Day With Meals for 30 days.  Dispense: 60 packet; Refill: 0    Plan    Mary Lou 58-year-old female seen today in follow-up for diarrhea symptoms.  Recommend stool testing to rule out infectious pathogens and assess for colonic inflammation which have been ordered yesterday.  Follow-up lab work today as above.  Trial of Questran in the interim.  Further recommendations and follow-up pending workup.  If stool testing negative for infection consider course of budesonide given her history of collagenous colitis.         CAL Monzon  [facility]  [address] [place] [address]  Office: [phone]

## 2024-04-25 LAB
ENDOMYSIUM IGA SER QL: NEGATIVE
GLIADIN PEPTIDE IGA SER-ACNC: 4 UNITS (ref 0–19)
GLIADIN PEPTIDE IGG SER-ACNC: 2 UNITS (ref 0–19)
IGA SERPL-MCNC: 41 MG/DL (ref 87–352)
TTG IGA SER-ACNC: <2 U/ML (ref 0–3)
TTG IGG SER-ACNC: <2 U/ML (ref 0–5)

## 2024-04-26 NOTE — PROGRESS NOTES
Please inform the patient lab work shows elevated kidney function or creatinine would recommend she drink plenty of water and discuss further with her primary care provider Raven Lanza.  Inflammatory markers are normal.  No evidence of anemia.  Her thyroid function is fine.  No evidence of celiac disease but she does have a decreased IgA.  Would recommend consultation with immunology to see Dr. Gregoria Nieto for further evaluation.

## 2024-04-29 ENCOUNTER — TELEPHONE (OUTPATIENT)
Dept: GASTROENTEROLOGY | Facility: CLINIC | Age: 59
End: 2024-04-29
Payer: MEDICAID

## 2024-04-29 NOTE — TELEPHONE ENCOUNTER
----- Message from Julia MELENDREZ sent at 4/26/2024  8:43 AM EDT -----  Please inform the patient lab work shows elevated kidney function or creatinine would recommend she drink plenty of water and discuss further with her primary care provider Raven Lanza.  Inflammatory markers are normal.  No evidence of anemia.  Her   thyroid function is fine.  No evidence of celiac disease but she does have a decreased IgA.  Would recommend consultation with immunology to see Dr. Gregoria Nieto for further evaluation.

## 2024-04-29 NOTE — TELEPHONE ENCOUNTER
Patient called. Advised as per Edna's note. She verb understanding. Patient is agreeable to the referral to Dr. Nieto and will call her PCP in regards to her lab work.     Referral faxed to Dr. Nieto's office with the attention to AJ. Verbal update to Edna regarding the referral.

## 2024-04-30 ENCOUNTER — TELEPHONE (OUTPATIENT)
Dept: FAMILY MEDICINE CLINIC | Facility: CLINIC | Age: 59
End: 2024-04-30

## 2024-04-30 RX ORDER — BUDESONIDE 3 MG/1
9 CAPSULE, COATED PELLETS ORAL DAILY
Qty: 120 CAPSULE | Refills: 3 | Status: SHIPPED | OUTPATIENT
Start: 2024-04-30

## 2024-04-30 NOTE — TELEPHONE ENCOUNTER
Caller: Demi Leija    Relationship: Self    Best call back number: [phone]    What is the best time to reach you: ANYTIME     Who are you requesting to speak with (clinical staff, provider,  specific staff member): CLINICAL     What was the call regarding: PATIENT STATES THAT HER [facility]STRO DOCTOR HAS DONE LABS ON HER AND SHE WOULD LIKE FOR HER PCP TO REVIEW THEM.     PATIENT STATES THAT [facility] IS SENDING HER TO A SPECIALIST, AND SHE THINKS IT IS A KIDNEY SPECIALIST.

## 2024-05-08 ENCOUNTER — TELEPHONE (OUTPATIENT)
Dept: GASTROENTEROLOGY | Facility: CLINIC | Age: 59
End: 2024-05-08

## 2024-05-08 NOTE — TELEPHONE ENCOUNTER
Provider: JIMMY MCCALL     Caller: KARENA RICHARDSON     Relationship to Patient: SELF    Pharmacy: [facility]    Phone Number: [phone]    Reason for Call: PT CALLED AND IS WONDERING WITH THE LAB RESULTS AND A HIGH POTASSIUM LEVEL AND IS URINATING 4 TIMES AN HOUR AND IS WONDERING IF SHE CAN TAKE PEDIALYTE TO STAY HYDRATED BECAUSE SHE HAS A HEADACHE DUE TO HYDRATION AND SHE REALIZED PEDIALYTE IS HIGH IN POTASSIUM IS IT OK FOR HER TO TAKE PLEASE ADVISE AND CALL PT BACK

## 2024-05-09 ENCOUNTER — TELEPHONE (OUTPATIENT)
Dept: GASTROENTEROLOGY | Facility: CLINIC | Age: 59
End: 2024-05-09
Payer: MEDICAID

## 2024-05-10 NOTE — TELEPHONE ENCOUNTER
Called pt and advised of Edna DUARTE's note.  Pt verbalized understanding.    Pt states she had blood drawn at allergist (Dr Nieto) on Tues and had blood drawn. They will fax labs to our office. Update sent to FELICIA Bishop.

## 2024-05-10 NOTE — TELEPHONE ENCOUNTER
She could hold electrolyte beverages and just drink water.  Sounds like she might need a urinalysis has this been offered to her by her primary care provider if not she might want to discuss frequent urination with them.  Lets have her come in for an updated CMP to reassess her potassium.

## 2024-05-13 ENCOUNTER — TELEPHONE (OUTPATIENT)
Dept: FAMILY MEDICINE CLINIC | Facility: CLINIC | Age: 59
End: 2024-05-13
Payer: MEDICAID

## 2024-05-13 NOTE — TELEPHONE ENCOUNTER
Patient called she thinks Raven responded to her message and wants to do a urine culture. Asking if she can come in this afternoon.  No order in chart.  [phone]

## 2024-05-14 ENCOUNTER — TELEPHONE (OUTPATIENT)
Dept: FAMILY MEDICINE CLINIC | Facility: CLINIC | Age: 59
End: 2024-05-14
Payer: MEDICAID

## 2024-05-14 DIAGNOSIS — R39.9 UTI SYMPTOMS: Primary | ICD-10-CM

## 2024-05-14 NOTE — TELEPHONE ENCOUNTER
Patient calling to check if she left enough of an urine sample.  Please call patient if not enough.    Call back:# [phone]

## 2024-05-15 DIAGNOSIS — R53.82 CHRONIC FATIGUE: ICD-10-CM

## 2024-05-15 DIAGNOSIS — Z13.228 ENCOUNTER FOR SCREENING FOR METABOLIC DISORDER: Primary | ICD-10-CM

## 2024-05-16 ENCOUNTER — LAB (OUTPATIENT)
Dept: FAMILY MEDICINE CLINIC | Facility: CLINIC | Age: 59
End: 2024-05-16
Payer: MEDICAID

## 2024-05-16 DIAGNOSIS — Z13.228 ENCOUNTER FOR SCREENING FOR METABOLIC DISORDER: ICD-10-CM

## 2024-05-16 DIAGNOSIS — R53.82 CHRONIC FATIGUE: ICD-10-CM

## 2024-05-16 LAB — HBA1C MFR BLD: 5.4 % (ref 4.8–5.6)

## 2024-05-17 LAB — ANA SER QL: NEGATIVE

## 2024-05-18 ENCOUNTER — HOSPITAL ENCOUNTER (OUTPATIENT)
Dept: MRI IMAGING | Facility: HOSPITAL | Age: 59
Discharge: HOME | End: 2024-05-18
Payer: MEDICAID

## 2024-05-18 DIAGNOSIS — M77.10 LATERAL EPICONDYLITIS, UNSPECIFIED LATERALITY: ICD-10-CM

## 2024-05-18 PROCEDURE — 73221 MRI JOINT UPR EXTREM W/O DYE: CPT

## 2024-05-21 ENCOUNTER — TELEPHONE (OUTPATIENT)
Dept: ORTHOPEDIC SURGERY | Facility: CLINIC | Age: 59
End: 2024-05-21
Payer: MEDICAID

## 2024-05-21 NOTE — TELEPHONE ENCOUNTER
Resutls from Dr. Lira:    Please tell her she does have a partial tear of the tendon at the elbow nothing that I look like look at that I think needs to be fixed I think her options going forward would be a repeat steroid injection we could do a PRP injection under ultrasound as well see which she would rather do.

## 2024-05-23 DIAGNOSIS — R76.8 HIGH TOTAL SERUM IGA: Primary | ICD-10-CM

## 2024-05-23 NOTE — TELEPHONE ENCOUNTER
I will send her a message and tell her we injected in January if she got some relief from it I would do the steroid injection again.  We can always set her up for a PRP with Dr. Velasquez if she wishes

## 2024-05-26 LAB
ANA SER QL IF: POSITIVE
ANA SPECKLED TITR SER: ABNORMAL {TITER}
C3 SERPL-MCNC: 148 MG/DL (ref 82–167)
C4 SERPL-MCNC: 25 MG/DL (ref 14–44)
CHROMATIN IGG SERPL-ACNC: <20 UNITS
DSDNA AB SER FARR-ACNC: <8 IU/ML
ENA SM AB SER-ACNC: <20 UNITS
ENA SS-A AB SER IA-ACNC: <20 UNITS
ENA SS-B AB SER IA-ACNC: <20 UNITS
LABORATORY COMMENT REPORT: ABNORMAL
U1 SNRNP AB SER QL: <20 UNITS

## 2024-05-28 DIAGNOSIS — R76.8 ANA POSITIVE: Primary | ICD-10-CM

## 2024-06-05 NOTE — PROGRESS NOTES
Patient: Demi Leija  YOB: 1965  Date of Service: 6/5/2024    Chief Complaints: Right elbow pain    Subjective:    History of Present Illness: Pt is seen in the office today with complaints of right elbow pain I saw her in February we thought it was more lateral epicondylitis we did conservative measures she got better for period time her symptoms have persisted it is lateral and medial to lateral elbow feels better she also comes with an MRI which shows moderate to severe common extensor tendinopathy she has chronic partial-thickness tear of the proximal radial collateral ligament complex she has mild common flexor tendinopathy        Allergies:   Allergies   Allergen Reactions   • Nylon Unknown - Low Severity     Nylon stitches   will not dissolve       Medications:   Home Medications:  Current Outpatient Medications on File Prior to Visit   Medication Sig   • albuterol sulfate  (90 Base) MCG/ACT inhaler Inhale 2 puffs Every 4 (Four) Hours As Needed for Wheezing.   • amantadine (SYMMETREL) 100 MG tablet    • Budesonide (ENTOCORT EC) 3 MG 24 hr capsule Take 3 capsules by mouth Daily. Take 3 capsules by mouth daily x 6 weeks then decrease by 1 capsule a month until off   • cholestyramine (Questran) 4 g packet Take 1 packet by mouth 2 (Two) Times a Day With Meals for 30 days.   • diazePAM (Valium) 5 MG tablet Take 1 pill 30 minutes before MRI, cannot drive while taking this medication may repeat x 1 if needed   • estradiol (ESTRACE) 0.1 MG/GM vaginal cream Insert 2 g into the vagina Daily.   • famotidine (PEPCID) 40 MG tablet 14 Tab, 0 Refill(s), 0 Refill(s)   • fluticasone (FLONASE) 50 MCG/ACT nasal spray 2 sprays into the nostril(s) as directed by provider Daily.   • hydrOXYzine (ATARAX) 25 MG tablet TAKE 1 TABLET BY MOUTH EVERY NIGHT AT BEDTIME   • Ibuprofen 3 %, Gabapentin 10 %, Baclofen 2 %, lidocaine 4 % Apply 1-2 g topically to the appropriate area as directed 3 (Three) to 4  (Four) times daily.   • Progesterone (PROMETRIUM) 200 MG capsule Take 1 capsule by mouth Daily.     No current facility-administered medications on file prior to visit.     Current Medications:  Scheduled Meds:  Continuous Infusions:No current facility-administered medications for this visit.    PRN Meds:.    I have reviewed the patient's medical history in detail and updated the computerized patient record.  Review and summarization of old records include:    Past Medical History:   Diagnosis Date   • Acquired absence of other specified parts of digestive tract    • Asthma    • Chronic serous otitis media    • Colitis    • Crohn disease    • Diarrhea, unspecified    • Gastroesophageal reflux disease 10/11/2023   • Headache    • Hypothyroidism    • Lumbar herniated disc    • Pain in joints of unspecified hand    • Post-menopause    • Sciatica associated with disorder of lumbar spine    • Tattoos         Past Surgical History:   Procedure Laterality Date   • ABDOMINOPLASTY  2012    mini   • BUNIONECTOMY Bilateral 2010   • CHOLECYSTECTOMY  2021   • COLONOSCOPY  10/2021   • FOOT FRACTURE SURGERY Left     great toe   • HEMORRHOIDECTOMY  2010   • NOSE SURGERY     • RHINOPLASTY  1995   • STOMACH SURGERY     • TOE SURGERY     • TONSILLECTOMY     • WISDOM TOOTH EXTRACTION          Social History     Occupational History   • Occupation: REALTOR   Tobacco Use   • Smoking status: Never   • Smokeless tobacco: Never   Vaping Use   • Vaping status: Never Used   Substance and Sexual Activity   • Alcohol use: Yes     Alcohol/week: 6.0 standard drinks of alcohol     Types: 4 Glasses of wine, 2 Drinks containing 0.5 oz of alcohol per week   • Drug use: Yes     Types: Marijuana     Comment: 4-5 times a year   • Sexual activity: Yes     Partners: Male     Birth control/protection: None, Post-menopausal     Comment: Partner had a vasectomy      Social History     Social History Narrative   • Not on file        Family History   Problem  Relation Age of Onset   • Diabetes Mother    • Arthritis Mother    • Stroke Mother    • Arthritis Father    • Cancer Father         colon   • Stroke Father    • Colon cancer Father    • Diabetes Father    • Hypertension Sister    • Cancer Brother         prostate   • Stroke Brother         x 2   • Hypertension Brother    • Colon polyps Brother    • SARA disease Brother    • Irritable bowel syndrome Brother         Brothers   • Prostate cancer Brother    • Hypertension Brother    • Stroke Brother    • Prostate cancer Brother    • Stroke Brother    • Hypertension Brother    • Stroke Maternal Grandmother    • Diabetes Maternal Grandmother    • Colon cancer Maternal Grandmother    • Stroke Paternal Grandmother    • Hypertension Other        ROS: 14 point review of systems was performed and was negative except for documented findings in HPI and today's encounter.     Allergies:   Allergies   Allergen Reactions   • Nylon Unknown - Low Severity     Nylon stitches   will not dissolve     Constitutional:  Denies fever, shaking or chills   Eyes:  Denies change in visual acuity   HENT:  Denies nasal congestion or sore throat   Respiratory:  Denies cough or shortness of breath   Cardiovascular:  Denies chest pain or severe LE edema   GI:  Denies abdominal pain, nausea, vomiting, bloody stools or diarrhea   Musculoskeletal:  Numbness, tingling, or loss of motor function only as noted above in history of present illness.  : Denies painful urination or hematuria  Integument:  Denies rash, lesion or ulceration   Neurologic:  Denies headache or focal weakness  Endocrine:  Denies lymphadenopathy  Psych:  Denies confusion or change in mental status   Hem:  Denies active bleeding      Physical Exam: 58 y.o. female  Wt Readings from Last 3 Encounters:   04/24/24 63 kg (139 lb)   04/23/24 62.6 kg (138 lb)   03/06/24 62.6 kg (138 lb)       There is no height or weight on file to calculate BMI.    There were no vitals filed for this  visit.  Vital signs reviewed.   General Appearance:    Alert, cooperative, in no acute distress                    Ortho exam      Physical exam of the right elbow reveals no effusion no redness.  The patient has full range of motion.  They have tenderness over the medial condyle.  There pain with resisted wris  Flexion pronationt .  They have a negative Tinel's.  Have no overlying skin changes no lymphedema no lymphadenopathy.  Good distal pulses            MRIs as above have reviewed this and agree      Assessment: Right elbow pain most of her symptoms seem medial such as golfers elbow of the lateral has calmed down think it is reasonable to inject that, the medial side to see if we can give her continued relief.  Should she fail to improve with that would consider a PRP versus a referral to Dr. Cheung    Plan:   Follow up as indicated.  Ice, elevate, and rest as needed.  Discussed conservative measures of pain control including ice, bracing.   Medium Joint Arthrocentesis: R elbow  Date/Time: 6/6/2024 1:23 PM  Consent given by: patient  Site marked: site marked  Timeout: Immediately prior to procedure a time out was called to verify the correct patient, procedure, equipment, support staff and site/side marked as required   Supporting Documentation  Indications: pain   Procedure Details  Location: elbow - R elbow  Preparation: Patient was prepped and draped in the usual sterile fashion  Needle gauge: 21G.  Approach: anteromedial  Medications administered: 1 mL methylPREDNISolone acetate 80 MG/ML; 2 mL lidocaine PF 1% 1 %  Patient tolerance: patient tolerated the procedure well with no immediate complications         Farzaneh Lira M.D.

## 2024-06-06 ENCOUNTER — OFFICE VISIT (OUTPATIENT)
Dept: ORTHOPEDIC SURGERY | Facility: CLINIC | Age: 59
End: 2024-06-06
Payer: MEDICAID

## 2024-06-06 VITALS — TEMPERATURE: 98 F | BODY MASS INDEX: 21.89 KG/M2 | HEIGHT: 66 IN | WEIGHT: 136.2 LBS

## 2024-06-06 DIAGNOSIS — M77.01 GOLFER'S ELBOW, RIGHT: Primary | ICD-10-CM

## 2024-06-06 RX ADMIN — LIDOCAINE HYDROCHLORIDE 2 ML: 10 INJECTION, SOLUTION EPIDURAL; INFILTRATION; INTRACAUDAL; PERINEURAL at 13:23

## 2024-06-06 RX ADMIN — METHYLPREDNISOLONE ACETATE 1 ML: 80 INJECTION, SUSPENSION INTRA-ARTICULAR; INTRALESIONAL; INTRAMUSCULAR; SOFT TISSUE at 13:23

## 2024-06-07 RX ORDER — LIDOCAINE HYDROCHLORIDE 10 MG/ML
2 INJECTION, SOLUTION EPIDURAL; INFILTRATION; INTRACAUDAL; PERINEURAL
Status: COMPLETED | OUTPATIENT
Start: 2024-06-06 | End: 2024-06-06

## 2024-06-07 RX ORDER — METHYLPREDNISOLONE ACETATE 80 MG/ML
1 INJECTION, SUSPENSION INTRA-ARTICULAR; INTRALESIONAL; INTRAMUSCULAR; SOFT TISSUE
Status: COMPLETED | OUTPATIENT
Start: 2024-06-06 | End: 2024-06-06

## 2024-06-19 ENCOUNTER — OFFICE VISIT (OUTPATIENT)
Dept: OBSTETRICS AND GYNECOLOGY | Facility: CLINIC | Age: 59
End: 2024-06-19
Payer: MEDICAID

## 2024-06-19 VITALS
DIASTOLIC BLOOD PRESSURE: 82 MMHG | BODY MASS INDEX: 21.86 KG/M2 | WEIGHT: 136 LBS | SYSTOLIC BLOOD PRESSURE: 132 MMHG | HEIGHT: 66 IN

## 2024-06-19 DIAGNOSIS — K64.4 EXTERNAL HEMORRHOIDS: ICD-10-CM

## 2024-06-19 DIAGNOSIS — R76.8 POSITIVE ANA (ANTINUCLEAR ANTIBODY): ICD-10-CM

## 2024-06-19 DIAGNOSIS — Z01.411 ENCOUNTER FOR GYNECOLOGICAL EXAMINATION WITH ABNORMAL FINDING: Primary | ICD-10-CM

## 2024-06-19 NOTE — PROGRESS NOTES
GYN ANNUAL EXAM   Chief Complaint   Patient presents with    Annual Exam     AE and pap today       BILL Simms is a 58 y.o. female who presents for annual well woman exam. She is a patient of Dr. Mahmood. She does report a recent positive SCOT and reports that she was given an appointment with [facility] a year from now. She also reports a return of hemorrhoids. She previously had a hemorrhoidectomy performed by MARISELA Ayoub MD and would like a referral to that office again to have her current hemorrhoids repaired.      OB History          5    Para   2    Term   2            AB   3    Living             SAB   3    IAB        Ectopic        Molar        Multiple        Live Births              Obstetric Comments   Menopause (after miscarriage)n2016               SUBJECTIVE    MENSTRUAL & SEXUAL HEALTH  LMP: No LMP recorded. Patient is postmenopausal.  Menses regularity: post menopausal status  Menses length: post menopausal status  Dysmenorrhea: post menopausal status  Cyclic symptoms: post menopausal status  Current contraception: post menopausal status  History of abnormal pap: no  History of STD: No  Family history of cancer: colon cancer       Family history of breast cancer: no  Performs SBE: performs monthly.  Colonoscopy:   Bleeding since LMP: no  Vasomotor symptoms: no  HRT: yes  Incontinence: yes, specialty providers believe this may be from sequelae from spinal surgery  Dyspareunia: no    Past Medical History:   Diagnosis Date    Acquired absence of other specified parts of digestive tract     Asthma     Chronic serous otitis media     Colitis     Crohn disease     Diarrhea, unspecified     Gastroesophageal reflux disease 10/11/2023    Headache     Hypothyroidism     Lumbar herniated disc     Pain in joints of unspecified hand     Post-menopause     Sciatica associated with disorder of lumbar spine     Tattoos        Past Surgical History:   Procedure Laterality Date     ABDOMINOPLASTY  2012    mini    BUNIONECTOMY Bilateral 2010    CHOLECYSTECTOMY  2021    COLONOSCOPY  10/2021    FOOT FRACTURE SURGERY Left     great toe    HEMORRHOIDECTOMY  2010    NOSE SURGERY      RHINOPLASTY  1995    STOMACH SURGERY      TOE SURGERY      TONSILLECTOMY      WISDOM TOOTH EXTRACTION           Current Outpatient Medications:     albuterol sulfate  (90 Base) MCG/ACT inhaler, Inhale 2 puffs Every 4 (Four) Hours As Needed for Wheezing., Disp: 18 g, Rfl: 3    Budesonide (ENTOCORT EC) 3 MG 24 hr capsule, Take 3 capsules by mouth Daily. Take 3 capsules by mouth daily x 6 weeks then decrease by 1 capsule a month until off, Disp: 120 capsule, Rfl: 3    hydrOXYzine (ATARAX) 25 MG tablet, TAKE 1 TABLET BY MOUTH EVERY NIGHT AT BEDTIME, Disp: 90 tablet, Rfl: 1    Progesterone (PROMETRIUM) 200 MG capsule, Take 1 capsule by mouth Daily., Disp: 90 capsule, Rfl: 3    estradiol (ESTRACE) 0.1 MG/GM vaginal cream, Insert 2 g into the vagina Daily. (Patient not taking: Reported on 6/19/2024), Disp: , Rfl:     fluticasone (FLONASE) 50 MCG/ACT nasal spray, 2 sprays into the nostril(s) as directed by provider Daily. (Patient not taking: Reported on 6/19/2024), Disp: 18.2 mL, Rfl: 5    Allergies   Allergen Reactions    Nylon Unknown - Low Severity     Nylon stitches   will not dissolve       Social History     Tobacco Use    Smoking status: Never    Smokeless tobacco: Never   Vaping Use    Vaping status: Never Used   Substance Use Topics    Alcohol use: Yes     Alcohol/week: 6.0 standard drinks of alcohol     Types: 4 Glasses of wine, 2 Drinks containing 0.5 oz of alcohol per week    Drug use: Yes     Types: Marijuana     Comment: 4-5 times a year       Family History   Problem Relation Age of Onset    Diabetes Mother     Arthritis Mother     Stroke Mother     Arthritis Father     Cancer Father         colon    Stroke Father     Colon cancer Father     Diabetes Father     Hypertension Sister     Cancer Brother        "  prostate    Stroke Brother         x 2    Hypertension Brother     Colon polyps Brother     SARA disease Brother     Irritable bowel syndrome Brother         Brothers    Prostate cancer Brother     Hypertension Brother     Stroke Brother     Prostate cancer Brother     Stroke Brother     Hypertension Brother     Stroke Maternal Grandmother     Diabetes Maternal Grandmother     Colon cancer Maternal Grandmother     Stroke Paternal Grandmother     Hypertension Other        Review of Systems   Constitutional:  Negative for fatigue, unexpected weight gain and unexpected weight loss.   Gastrointestinal:  Negative for abdominal pain.   Genitourinary:  Negative for decreased libido, difficulty urinating, dyspareunia, dysuria, pelvic pain, pelvic pressure, urgency, urinary incontinence and vaginal discharge.   All other systems reviewed and are negative.      OBJECTIVE    /82   Ht 167.6 cm (65.98\")   Wt 61.7 kg (136 lb)   BMI 21.96 kg/m²     Physical Exam  Constitutional:       General: She is awake. She is not in acute distress.     Appearance: Normal appearance. She is well-developed and well-groomed. She is not ill-appearing.   Genitourinary:      Vulva, bladder and urethral meatus normal.      Right Labia: No rash, tenderness, lesions or skin changes.     Left Labia: No tenderness, lesions, skin changes or rash.     No labial fusion noted.      No inguinal adenopathy present in the right or left side.     No vaginal discharge, erythema, tenderness, bleeding, ulceration or granulation tissue.      No vaginal prolapse present.     No vaginal atrophy present.     No cervical discharge, friability, lesion, polyp, eversion or elongation.      Uterus is not enlarged, tender or prolapsed.      Pelvic exam was performed with patient in the lithotomy position.   Breasts:     Breasts are symmetrical.      Breasts are soft.     Right: Normal.      Left: Normal.   HENT:      Head: Normocephalic and atraumatic.   Eyes:     "  General: No scleral icterus.     Conjunctiva/sclera: Conjunctivae normal.   Cardiovascular:      Rate and Rhythm: Normal rate and regular rhythm.      Heart sounds: Normal heart sounds.   Pulmonary:      Effort: Pulmonary effort is normal.      Breath sounds: Normal breath sounds.   Abdominal:      Palpations: Abdomen is soft.      Hernia: There is no hernia in the left inguinal area or right inguinal area.   Musculoskeletal:         General: Normal range of motion.      Cervical back: Normal range of motion.   Lymphadenopathy:      Lower Body: No right inguinal adenopathy. No left inguinal adenopathy.   Neurological:      Mental Status: She is alert.   Skin:     General: Skin is warm and dry.      Coloration: Skin is not jaundiced or pale.   Psychiatric:         Behavior: Behavior normal. Behavior is cooperative.   Vitals reviewed.         ASSESSMENT    Diagnoses and all orders for this visit:    1. Encounter for gynecological examination with abnormal finding (Primary)  -     IGP, Apt HPV,rfx 16 / 18,45  -     Hemoglobin A1c; Future  -     FSH & LH; Future    2. External hemorrhoids  -     Ambulatory Referral to General Surgery    3. Positive SCOT (antinuclear antibody)  -     Ambulatory Referral to Rheumatology         PLAN   WELL WOMAN EXAM: Pap smear collected today. Recommend MVI daily.    SMOKING STATUS: non smoker.  BONE HEALTH: weight bearing exercise, dietary calcium recommendations and vitamin D reviewed.  COLON HEALTH: screening colonoscopy or Cologuard recommended.  BREAST HEALTH: Encouraged annual mammogram screening starting at age 40. Instructed on how to perform SBE. Encouraged breast health self awareness.  EXERCISE: Encouraged 150 minutes of exercise per week if not medially contraindicated.   BMI: Body mass index is 21.96 kg/m².     Return in about 1 year (around 6/19/2025) for annual physical.    I spent 15 minutes caring for Demi on this date of service. This time includes time spent by me in  the following activities: preparing for the visit, reviewing tests, performing a medically appropriate examination and/or evaluation, counseling and educating the patient/family/caregiver, referring and communicating with other health care professionals, documenting information in the medical record, independently interpreting results and communicating that information with the patient/family/caregiver, care coordination, ordering medications, ordering test(s), ordering procedure(s), obtaining a separately obtained history, and reviewing a separately obtained history.    Lenora Tamayo CNM  6/19/2024  14:26 EDT

## 2024-06-20 ENCOUNTER — TELEPHONE (OUTPATIENT)
Dept: OBSTETRICS AND GYNECOLOGY | Facility: CLINIC | Age: 59
End: 2024-06-20

## 2024-06-20 NOTE — TELEPHONE ENCOUNTER
Caller: Demi Leija    Relationship: Self    Best call back number: [phone]    Requested Prescriptions:   ESTRADIOL TESTOSTERONE COMPOUND    Pharmacy where request should be sent:    [facility] - [place], [place] - [address] [facility] [address] - [phone]  - [phone] FX   Last office visit with prescribing clinician: 6/19/2024     Next office visit with prescribing clinician: Visit date not found     Additional details provided by patient: PT HAS BEEN OUT FOR SEVERAL DAYS; PT STATED THAT THE PHARMACY SENT THE REQUEST OVER LAST WEEK AND HAS STILL HEARD NOTHING BACK; I DO NOT SEE IN HER CHART WHERE THEY HAVE SENT ANYTHING;  PT SAID IF SHE NEEDS TO DO BLOOD WORK SHE WILL BUT PLEASE CALL IN ATLEAST 1 MONTH WORTH OF MEDICATION    PLEASE SEND TO PHARMACY ASAP    Does the patient have less than a 3 day supply:  [x] Yes  [] No    Would you like a call back once the refill request has been completed: [x] Yes [] No    If the office needs to give you a call back, can they leave a voicemail: [x] Yes [] No    Ck Reyes   06/20/24 11:21 EDT

## 2024-06-20 NOTE — TELEPHONE ENCOUNTER
I called [facility]. The Rx is Estradiol Luna - Testosterone Enam IM 6 mg/20 mg per ml cream. #30 ml. Apply 2 clicks topically twice daily. I did authorize 1 refill from you. I hope that is ok. Thank you.

## 2024-06-24 DIAGNOSIS — R76.8 ANA POSITIVE: Primary | ICD-10-CM

## 2024-06-24 DIAGNOSIS — R76.8 HIGH TOTAL SERUM IGA: ICD-10-CM

## 2024-06-24 LAB
CYTOLOGIST CVX/VAG CYTO: NORMAL
CYTOLOGY CVX/VAG DOC CYTO: NORMAL
CYTOLOGY CVX/VAG DOC THIN PREP: NORMAL
DX ICD CODE: NORMAL
HPV I/H RISK 4 DNA CVX QL PROBE+SIG AMP: NEGATIVE
Lab: NORMAL
Lab: NORMAL
OTHER STN SPEC: NORMAL
STAT OF ADQ CVX/VAG CYTO-IMP: NORMAL

## 2024-08-02 ENCOUNTER — TELEPHONE (OUTPATIENT)
Dept: OBSTETRICS AND GYNECOLOGY | Facility: CLINIC | Age: 59
End: 2024-08-02
Payer: MEDICAID

## 2024-08-02 NOTE — TELEPHONE ENCOUNTER
Patient was supposed to have her estradiol cream refilled for a years worth at her annual visit, do you mind to send it in please?     Thanks!    Pharmacy confirmed - [facility] - [place], [place] - [address] - [phone] [facility] [phone] FX

## 2024-08-05 ENCOUNTER — TELEPHONE (OUTPATIENT)
Dept: OBSTETRICS AND GYNECOLOGY | Facility: CLINIC | Age: 59
End: 2024-08-05
Payer: MEDICAID

## 2024-08-05 RX ORDER — ESTRADIOL 0.1 MG/G
2 CREAM VAGINAL DAILY
Qty: 42.5 G | Refills: 3 | Status: SHIPPED | OUTPATIENT
Start: 2024-08-05

## 2024-08-05 NOTE — TELEPHONE ENCOUNTER
Looked under media and not request has been sent. Will call pharmacy and will have them send request .

## 2024-08-05 NOTE — TELEPHONE ENCOUNTER
Pt states pharmacy never received vaginal cream, pharmacy requests this please be sent in again. Thank you!    Pharmacy -   [facility] - [place], [place] - [address] - [phone] [facility] [phone] FX

## 2024-08-05 NOTE — TELEPHONE ENCOUNTER
Pharmacy called. Received prescription for Estradiol(Estrace Vaginal cream). Wanted to know if still wanted her to use Estradiol 6 MG/Testosterone 20 MG as well? [phone]

## 2024-08-05 NOTE — TELEPHONE ENCOUNTER
Informed pharmacy have requesting prescription to be sent to us since last week. Said they will send over.

## 2024-08-16 NOTE — PROGRESS NOTES
Patient: Demi Leija  YOB: 1965  Date of Service: 8/16/2024    Chief Complaints: Right elbow pain    Subjective:    History of Present Illness: Pt is seen in the office today with complaints of right elbow pain we have injected her laterally and medially she does get relief from both June it was medial January it was lateral we do have an MRI which shows some changes in both the flexor and extensor tendons the lateral 1 is bothering her now        Allergies:   Allergies   Allergen Reactions    Nylon Unknown - Low Severity     Nylon stitches   will not dissolve       Medications:   Home Medications:  Current Outpatient Medications on File Prior to Visit   Medication Sig    albuterol sulfate  (90 Base) MCG/ACT inhaler Inhale 2 puffs Every 4 (Four) Hours As Needed for Wheezing.    Budesonide (ENTOCORT EC) 3 MG 24 hr capsule Take 3 capsules by mouth Daily. Take 3 capsules by mouth daily x 6 weeks then decrease by 1 capsule a month until off    estradiol (ESTRACE VAGINAL) 0.1 MG/GM vaginal cream Insert 2 g into the vagina Daily.    estradiol (ESTRACE) 0.1 MG/GM vaginal cream Insert 2 g into the vagina Daily. (Patient not taking: Reported on 6/19/2024)    fluticasone (FLONASE) 50 MCG/ACT nasal spray 2 sprays into the nostril(s) as directed by provider Daily. (Patient not taking: Reported on 6/19/2024)    hydrOXYzine (ATARAX) 25 MG tablet TAKE 1 TABLET BY MOUTH EVERY NIGHT AT BEDTIME    Progesterone (PROMETRIUM) 200 MG capsule Take 1 capsule by mouth Daily.     No current facility-administered medications on file prior to visit.     Current Medications:  Scheduled Meds:  Continuous Infusions:No current facility-administered medications for this visit.    PRN Meds:.    I have reviewed the patient's medical history in detail and updated the computerized patient record.  Review and summarization of old records include:    Past Medical History:   Diagnosis Date    Acquired absence of other  specified parts of digestive tract     Asthma     Chronic serous otitis media     Colitis     Crohn disease     Diarrhea, unspecified     Gastroesophageal reflux disease 10/11/2023    Headache     Hypothyroidism     Lumbar herniated disc     Pain in joints of unspecified hand     Post-menopause     Sciatica associated with disorder of lumbar spine     Tattoos         Past Surgical History:   Procedure Laterality Date    ABDOMINOPLASTY  2012    mini    BUNIONECTOMY Bilateral 2010    CHOLECYSTECTOMY  2021    COLONOSCOPY  10/2021    FOOT FRACTURE SURGERY Left     great toe    HEMORRHOIDECTOMY  2010    NOSE SURGERY      RHINOPLASTY  1995    STOMACH SURGERY      TOE SURGERY      TONSILLECTOMY      WISDOM TOOTH EXTRACTION          Social History     Occupational History    Occupation: REALTOR   Tobacco Use    Smoking status: Never    Smokeless tobacco: Never   Vaping Use    Vaping status: Never Used   Substance and Sexual Activity    Alcohol use: Yes     Alcohol/week: 6.0 standard drinks of alcohol     Types: 4 Glasses of wine, 2 Drinks containing 0.5 oz of alcohol per week    Drug use: Yes     Types: Marijuana     Comment: 4-5 times a year    Sexual activity: Yes     Partners: Male     Birth control/protection: None, Post-menopausal     Comment: Partner had a vasectomy      Social History     Social History Narrative    Not on file        Family History   Problem Relation Age of Onset    Diabetes Mother     Arthritis Mother     Stroke Mother     Arthritis Father     Cancer Father         colon    Stroke Father     Colon cancer Father     Diabetes Father     Hypertension Sister     Cancer Brother         prostate    Stroke Brother         x 2    Hypertension Brother     Colon polyps Brother     SARA disease Brother     Irritable bowel syndrome Brother         Brothers    Prostate cancer Brother     Hypertension Brother     Stroke Brother     Prostate cancer Brother     Stroke Brother     Hypertension Brother     Stroke  Maternal Grandmother     Diabetes Maternal Grandmother     Colon cancer Maternal Grandmother     Stroke Paternal Grandmother     Hypertension Other        ROS: 14 point review of systems was performed and was negative except for documented findings in HPI and today's encounter.     Allergies:   Allergies   Allergen Reactions    Nylon Unknown - Low Severity     Nylon stitches   will not dissolve     Constitutional:  Denies fever, shaking or chills   Eyes:  Denies change in visual acuity   HENT:  Denies nasal congestion or sore throat   Respiratory:  Denies cough or shortness of breath   Cardiovascular:  Denies chest pain or severe LE edema   GI:  Denies abdominal pain, nausea, vomiting, bloody stools or diarrhea   Musculoskeletal:  Numbness, tingling, or loss of motor function only as noted above in history of present illness.  : Denies painful urination or hematuria  Integument:  Denies rash, lesion or ulceration   Neurologic:  Denies headache or focal weakness  Endocrine:  Denies lymphadenopathy  Psych:  Denies confusion or change in mental status   Hem:  Denies active bleeding      Physical Exam: 58 y.o. female  Wt Readings from Last 3 Encounters:   06/19/24 61.7 kg (136 lb)   06/06/24 61.8 kg (136 lb 3.2 oz)   04/24/24 63 kg (139 lb)       There is no height or weight on file to calculate BMI.    There were no vitals filed for this visit.  Vital signs reviewed.   General Appearance:    Alert, cooperative, in no acute distress                    Ortho exam        Physical exam of the right elbow reveals no effusion no redness.  The patient has full range of motion.  They have tenderness over the lateral condyle.  There pain with resisted wrist extension no pain with passive wrist flexion.  They have a negative Tinel's.  Have no overlying skin changes no lymphedema no lymphadenopathy.  Good distal pulses          Assessment: Right lateral elbow pain plan is to proceed with an injection.  We did talk again about  platelets I think that is reasonable should her symptoms return I would do platelet injection under ultrasound with Dr. Velasquez or Dr. Rosado    Plan:   Follow up as indicated.  Ice, elevate, and rest as needed.  Discussed conservative measures of pain control including ice, bracing.  Also talked about the importance of strengthening and maintaining ideal body weight  Farzaneh Lira M.D.    Medium Joint Arthrocentesis: R elbow  Date/Time: 8/20/2024 10:04 AM  Consent given by: patient  Site marked: site marked  Timeout: Immediately prior to procedure a time out was called to verify the correct patient, procedure, equipment, support staff and site/side marked as required   Supporting Documentation  Indications: pain and diagnostic evaluation   Procedure Details  Location: elbow - R elbow (Lateral Epicondyle)  Preparation: Patient was prepped and draped in the usual sterile fashion  Needle size: 25 G  Approach: Into the area of the common flexor tendon.  Medications administered: 80 mg methylPREDNISolone acetate 80 MG/ML; 2 mL lidocaine PF 1% 1 %  Patient tolerance: patient tolerated the procedure well with no immediate complications

## 2024-08-20 ENCOUNTER — OFFICE VISIT (OUTPATIENT)
Dept: ORTHOPEDIC SURGERY | Facility: CLINIC | Age: 59
End: 2024-08-20
Payer: MEDICAID

## 2024-08-20 VITALS — TEMPERATURE: 98.7 F | HEIGHT: 66 IN | BODY MASS INDEX: 22.4 KG/M2 | WEIGHT: 139.4 LBS

## 2024-08-20 DIAGNOSIS — M77.11 LATERAL EPICONDYLITIS OF RIGHT ELBOW: Primary | ICD-10-CM

## 2024-08-20 PROCEDURE — 20605 DRAIN/INJ JOINT/BURSA W/O US: CPT | Performed by: ORTHOPAEDIC SURGERY

## 2024-08-20 PROCEDURE — 1160F RVW MEDS BY RX/DR IN RCRD: CPT | Performed by: ORTHOPAEDIC SURGERY

## 2024-08-20 PROCEDURE — 1159F MED LIST DOCD IN RCRD: CPT | Performed by: ORTHOPAEDIC SURGERY

## 2024-08-20 RX ORDER — LIDOCAINE HYDROCHLORIDE 10 MG/ML
2 INJECTION, SOLUTION EPIDURAL; INFILTRATION; INTRACAUDAL; PERINEURAL
Status: COMPLETED | OUTPATIENT
Start: 2024-08-20 | End: 2024-08-20

## 2024-08-20 RX ORDER — METHYLPREDNISOLONE ACETATE 80 MG/ML
80 INJECTION, SUSPENSION INTRA-ARTICULAR; INTRALESIONAL; INTRAMUSCULAR; SOFT TISSUE
Status: COMPLETED | OUTPATIENT
Start: 2024-08-20 | End: 2024-08-20

## 2024-08-20 RX ADMIN — LIDOCAINE HYDROCHLORIDE 2 ML: 10 INJECTION, SOLUTION EPIDURAL; INFILTRATION; INTRACAUDAL; PERINEURAL at 10:04

## 2024-08-20 RX ADMIN — METHYLPREDNISOLONE ACETATE 80 MG: 80 INJECTION, SUSPENSION INTRA-ARTICULAR; INTRALESIONAL; INTRAMUSCULAR; SOFT TISSUE at 10:04

## 2024-09-05 ENCOUNTER — TELEPHONE (OUTPATIENT)
Dept: FAMILY MEDICINE CLINIC | Facility: CLINIC | Age: 59
End: 2024-09-05
Payer: MEDICAID

## 2024-09-17 ENCOUNTER — TELEPHONE (OUTPATIENT)
Dept: OBSTETRICS AND GYNECOLOGY | Facility: CLINIC | Age: 59
End: 2024-09-17
Payer: MEDICAID

## 2024-09-17 ENCOUNTER — OFFICE VISIT (OUTPATIENT)
Dept: FAMILY MEDICINE CLINIC | Facility: CLINIC | Age: 59
End: 2024-09-17
Payer: MEDICAID

## 2024-09-17 VITALS
RESPIRATION RATE: 18 BRPM | DIASTOLIC BLOOD PRESSURE: 78 MMHG | WEIGHT: 137.5 LBS | SYSTOLIC BLOOD PRESSURE: 124 MMHG | HEIGHT: 66 IN | HEART RATE: 63 BPM | BODY MASS INDEX: 22.1 KG/M2 | OXYGEN SATURATION: 96 %
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
## 2024-09-17 DIAGNOSIS — R63.1 POLYDIPSIA: ICD-10-CM

## 2024-09-17 DIAGNOSIS — N18.31 STAGE 3A CHRONIC KIDNEY DISEASE: ICD-10-CM

## 2024-09-17 DIAGNOSIS — M70.71 BURSITIS OF OTHER BURSA OF RIGHT HIP: ICD-10-CM

## 2024-09-17 DIAGNOSIS — M25.551 RIGHT HIP PAIN: Primary | ICD-10-CM

## 2024-09-17 PROCEDURE — 99214 OFFICE O/P EST MOD 30 MIN: CPT | Performed by: NURSE PRACTITIONER

## 2024-09-17 PROCEDURE — 1160F RVW MEDS BY RX/DR IN RCRD: CPT | Performed by: NURSE PRACTITIONER

## 2024-09-17 PROCEDURE — 1159F MED LIST DOCD IN RCRD: CPT | Performed by: NURSE PRACTITIONER

## 2024-09-17 PROCEDURE — 1125F AMNT PAIN NOTED PAIN PRSNT: CPT | Performed by: NURSE PRACTITIONER

## 2024-09-17 RX ORDER — ESTRADIOL 0.1 MG/G
2 CREAM VAGINAL DAILY
Qty: 42.5 G | Refills: 12 | Status: SHIPPED | OUTPATIENT
Start: 2024-09-17

## 2024-09-17 RX ORDER — CYCLOBENZAPRINE HCL 10 MG
10 TABLET ORAL 3 TIMES DAILY PRN
Qty: 60 TABLET | Refills: 0 | Status: SHIPPED | OUTPATIENT
Start: 2024-09-17

## 2024-09-18 LAB
ALBUMIN SERPL-MCNC: 4.7 G/DL (ref 3.5–5.2)
ALBUMIN/GLOB SERPL: 2.1 G/DL
ALP SERPL-CCNC: 66 U/L (ref 39–117)
ALT SERPL-CCNC: 21 U/L (ref 1–33)
AST SERPL-CCNC: 18 U/L (ref 1–32)
BILIRUB SERPL-MCNC: 1 MG/DL (ref 0–1.2)
BUN SERPL-MCNC: 15 MG/DL (ref 6–20)
BUN/CREAT SERPL: 14.4 (ref 7–25)
CALCIUM SERPL-MCNC: 9.8 MG/DL (ref 8.6–10.5)
CHLORIDE SERPL-SCNC: 102 MMOL/L (ref 98–107)
CO2 SERPL-SCNC: 26.7 MMOL/L (ref 22–29)
CREAT SERPL-MCNC: 1.04 MG/DL (ref 0.57–1)
EGFRCR SERPLBLD CKD-EPI 2021: 62.4 ML/MIN/1.73
GLOBULIN SER CALC-MCNC: 2.2 GM/DL
GLUCOSE SERPL-MCNC: 97 MG/DL (ref 65–99)
HBA1C MFR BLD: 5.3 % (ref 4.8–5.6)
POTASSIUM SERPL-SCNC: 5.3 MMOL/L (ref 3.5–5.2)
PROT SERPL-MCNC: 6.9 G/DL (ref 6–8.5)
SODIUM SERPL-SCNC: 137 MMOL/L (ref 136–145)

## 2024-09-19 DIAGNOSIS — Z12.31 ENCOUNTER FOR SCREENING MAMMOGRAM FOR MALIGNANT NEOPLASM OF BREAST: ICD-10-CM

## 2024-09-19 DIAGNOSIS — Z79.890 HORMONE REPLACEMENT THERAPY (HRT): Primary | ICD-10-CM

## 2024-09-21 DIAGNOSIS — M25.551 RIGHT HIP PAIN: Primary | ICD-10-CM

## 2024-09-21 DIAGNOSIS — M70.71 BURSITIS OF OTHER BURSA OF RIGHT HIP: ICD-10-CM

## 2024-10-09 ENCOUNTER — TREATMENT (OUTPATIENT)
Facility: HOSPITAL | Age: 59
End: 2024-10-09
Payer: MEDICAID

## 2024-10-28 ENCOUNTER — OFFICE VISIT (OUTPATIENT)
Dept: FAMILY MEDICINE CLINIC | Facility: CLINIC | Age: 59
End: 2024-10-28
Payer: MEDICAID

## 2024-10-28 VITALS
BODY MASS INDEX: 21.38 KG/M2 | WEIGHT: 133 LBS | RESPIRATION RATE: 18 BRPM | SYSTOLIC BLOOD PRESSURE: 142 MMHG | OXYGEN SATURATION: 98 % | HEIGHT: 66 IN | HEART RATE: 64 BPM | DIASTOLIC BLOOD PRESSURE: 92 MMHG

## 2024-10-28 DIAGNOSIS — R35.0 URINARY FREQUENCY: Primary | ICD-10-CM

## 2024-10-28 DIAGNOSIS — R30.9 PAINFUL URINATION: ICD-10-CM

## 2024-10-28 DIAGNOSIS — R19.8 ALTERNATING CONSTIPATION AND DIARRHEA: ICD-10-CM

## 2024-10-28 PROBLEM — K92.9 DISORDER OF DIGESTIVE TRACT: Status: ACTIVE | Noted: 2024-02-27

## 2024-10-28 PROBLEM — Z78.0 POSTMENOPAUSAL STATE: Status: ACTIVE | Noted: 2024-02-27

## 2024-10-28 PROBLEM — R19.7 DIARRHEA: Status: ACTIVE | Noted: 2024-02-27

## 2024-10-28 PROBLEM — Z98.1 HISTORY OF LUMBAR FUSION: Status: ACTIVE | Noted: 2024-02-08

## 2024-10-28 LAB
BILIRUB BLD-MCNC: NEGATIVE MG/DL
CLARITY, POC: CLEAR
COLOR UR: YELLOW
GLUCOSE UR STRIP-MCNC: NEGATIVE MG/DL
KETONES UR QL: NEGATIVE
LEUKOCYTE EST, POC: ABNORMAL
NITRITE UR-MCNC: NEGATIVE MG/ML
PH UR: 6 [PH] (ref 5–8)
PROT UR STRIP-MCNC: ABNORMAL MG/DL
RBC # UR STRIP: NEGATIVE /UL
SP GR UR: 1.02 (ref 1–1.03)
UROBILINOGEN UR QL: NORMAL

## 2024-10-28 PROCEDURE — 1160F RVW MEDS BY RX/DR IN RCRD: CPT | Performed by: NURSE PRACTITIONER

## 2024-10-28 PROCEDURE — 1159F MED LIST DOCD IN RCRD: CPT | Performed by: NURSE PRACTITIONER

## 2024-10-28 PROCEDURE — 1125F AMNT PAIN NOTED PAIN PRSNT: CPT | Performed by: NURSE PRACTITIONER

## 2024-10-28 PROCEDURE — 99213 OFFICE O/P EST LOW 20 MIN: CPT | Performed by: NURSE PRACTITIONER

## 2024-10-28 RX ORDER — FLUCONAZOLE 100 MG/1
100 TABLET ORAL DAILY
Qty: 1 TABLET | Refills: 1 | Status: SHIPPED | OUTPATIENT
Start: 2024-10-28

## 2024-10-28 RX ORDER — NITROFURANTOIN 25; 75 MG/1; MG/1
100 CAPSULE ORAL 2 TIMES DAILY
Qty: 6 CAPSULE | Refills: 0 | Status: SHIPPED | OUTPATIENT
Start: 2024-10-28 | End: 2024-10-28

## 2024-10-28 RX ORDER — CIPROFLOXACIN 500 MG/1
500 TABLET, FILM COATED ORAL 2 TIMES DAILY
Qty: 6 TABLET | Refills: 0 | Status: SHIPPED | OUTPATIENT
Start: 2024-10-28

## 2024-10-28 NOTE — PROGRESS NOTES
Subjective   Demi Leija is a 58 y.o. female.     Urinary Frequency   Associated symptoms include frequency.   Difficulty Urinating   Associated symptoms include frequency.   Abdominal Pain  Associated symptoms include frequency.        The following portions of the patient's history were reviewed and updated as appropriate: allergies, current medications, past family history, past medical history, past social history, past surgical history and problem list.       The patient is a 58-year-old female who presents for evaluation of acute UTI symptoms.    Acute Uti symptoms x1 day. She reports experiencing a burning sensation upon waking yesterday, which she initially attributed to a urinary tract infection (UTI) or yeast infection. She took Diflucan tablet, which provided some relief. However, by evening, the discomfort returned. She has not experienced a fever but mentions feeling cold. She also noticed a change in her urine color, bright orange.. She has been taking Azo since yesterday at 11:00 AM. Her last antibiotic course was in March or April 2024. She has no new sexual partners, with her only partner being her fiancé. She has seen uroGyn but has not used estrogen topical often.     Chronic IBS symptoms x years. She has been experiencing alternating constipation and diarrhea, with constipation lasting for two days followed by diarrhea. She has not taken any medication for this except for a suppository. She has been taking a probiotic pill and has tried a gluten-free diet. She consumes almond milk and a small amount of yogurt in her smoothies. She has been considering trying AG1 supplement. She has been making a smoothie with apple, orange, kale, spinach, pineapple, and water, which sometimes helps with her constipation. She has been experiencing tenderness and gas. She has been using a women's gel for sensitive skin.  She has seen a GI specialist and is due for a colonoscopy next year.    Chronic hip pain,  has seen specialist and had MRI, hx of  lumbar fusion. She reports hip pain and is not able to be active at all. She is staying away from stairs. If she goes upstairs, she has to use her arm to pull herself up. If she does any type of movement with the hip like exercise or yoga, it gets inflamed and then she has to lay in bed with an ice pack on it. She wants to be active and do cycling and get back to yoga. If she uses the inner and outer thigh machine for 3 days afterwards, she can hardly even move. Has upcoming ortho appt     She has an upcoming appointment with a rheumatologist on December 3, 2024.         Review of Systems   Gastrointestinal:  Positive for abdominal pain.   Genitourinary:  Positive for difficulty urinating and frequency.           Current Outpatient Medications:     albuterol sulfate  (90 Base) MCG/ACT inhaler, Inhale 2 puffs Every 4 (Four) Hours As Needed for Wheezing., Disp: 18 g, Rfl: 3    cyclobenzaprine (FLEXERIL) 10 MG tablet, Take 1 tablet by mouth 3 (Three) Times a Day As Needed for Muscle Spasms., Disp: 60 tablet, Rfl: 0    estradiol (ESTRACE VAGINAL) 0.1 MG/GM vaginal cream, Insert 2 g into the vagina Daily., Disp: 42.5 g, Rfl: 12    Estradiol 10 % cream, APPLY TWO clicks topically TWICE DAILY, Disp: , Rfl:     fluticasone (FLONASE) 50 MCG/ACT nasal spray, 2 sprays into the nostril(s) as directed by provider Daily., Disp: 18.2 mL, Rfl: 5    hydrOXYzine (ATARAX) 25 MG tablet, TAKE 1 TABLET BY MOUTH EVERY NIGHT AT BEDTIME, Disp: 90 tablet, Rfl: 1    Progesterone (PROMETRIUM) 200 MG capsule, Take 1 capsule by mouth Daily., Disp: 90 capsule, Rfl: 3    Objective   Physical Exam  Vitals reviewed.   Constitutional:       Appearance: Normal appearance.   HENT:      Mouth/Throat:      Mouth: Mucous membranes are moist.   Cardiovascular:      Pulses: Normal pulses.   Pulmonary:      Effort: Pulmonary effort is normal.   Abdominal:      General: Bowel sounds are normal.      Tenderness:  There is abdominal tenderness. There is no right CVA tenderness, left CVA tenderness or guarding.   Musculoskeletal:         General: Normal range of motion.   Skin:     General: Skin is warm.   Neurological:      Mental Status: She is alert.         Vitals:    10/28/24 1247   BP: 142/92   Pulse: 64   Resp: 18   SpO2: 98%     Body mass index is 21.47 kg/m².    Procedures    TSH   Date Value Ref Range Status   04/24/2024 1.840 0.270 - 4.200 uIU/mL Final     Hemoglobin A1C   Date Value Ref Range Status   09/17/2024 5.30 4.80 - 5.60 % Final     Comment:     Hemoglobin A1C Ranges:  Increased Risk for Diabetes  5.7% to 6.4%  Diabetes                     >= 6.5%  Diabetic Goal                < 7.0%                     Assessment & Plan   Problems Addressed this Visit    None  Visit Diagnoses       Urinary frequency    -  Primary    Relevant Orders    POC Urinalysis Dipstick (Completed)    Urine Culture - Urine, Urine, Clean Catch    Genital Mycoplasma CARRIE Urine - Urine, Clean Catch    Painful urination        Relevant Orders    POC Urinalysis Dipstick (Completed)    Urine Culture - Urine, Urine, Clean Catch    Genital Mycoplasma CARRIE Urine - Urine, Clean Catch    Abdominal pain, unspecified abdominal location        Relevant Orders    POC Urinalysis Dipstick (Completed)          Diagnoses         Codes Comments    Urinary frequency    -  Primary ICD-10-CM: R35.0  ICD-9-CM: 788.41     Painful urination     ICD-10-CM: R30.9  ICD-9-CM: 788.1     Abdominal pain, unspecified abdominal location     ICD-10-CM: R10.9  ICD-9-CM: 789.00                1. Urinary Tract Infection (UTI). + leuks on urinalysis, start nitrofurantoin 100 bid x 3 days, awaiting culture   She presents with acute UTI symptoms, including burning sensation and discolored urine, which could be due to Azo medication. A urine culture will be performed to check for mycoplasma and other bacteria, including E. coli. She is advised to maintain good hygiene practices  such as wiping from front to back, wearing cotton panties, and sleeping without panties. The use of a gentle cleanser like Dove is recommended. She is also advised to drink plenty of water.    2. Constipation and Diarrhea.  She reports alternating episodes of constipation and diarrhea. Over-the-counter IBgard and Coconut Cult are suggested to help manage her symptoms. A gluten-free diet may also be beneficial. She is encouraged to consult with Sunita Carrizales for additional recommendations. She is advised to make notes of her bowel movements to better understand her symptoms.    3. Hip Pain.  She experiences significant hip pain that limits her physical activity. She is advised to avoid activities that exacerbate the pain, such as using the inner and outer thigh machine. She is scheduled to see her specialist on Wednesday for further evaluation.    4. Health Maintenance.  She is due for a colonoscopy next year (2025) and will reach out to her GI specialist to confirm the timing.         Ok to take fluconazole after antibiotics, no alcohol w meds        Education provided in AVS   No follow-ups on file.    Patient or patient representative verbalized consent for the use of Ambient Listening during the visit with  CAL Calloway for chart documentation. 10/28/2024  13:12 EDT

## 2024-10-28 NOTE — PROGRESS NOTES
Subjective   Demi Leija is a 58 y.o. female.     History of Present Illness     The following portions of the patient's history were reviewed and updated as appropriate: allergies, current medications, past family history, past medical history, past social history, past surgical history and problem list.    Review of Systems    Objective   Physical Exam    Vitals:    10/28/24 1247   BP: 142/92   Pulse: 64   Resp: 18   SpO2: 98%     Body mass index is 21.47 kg/m².    Procedures    Assessment & Plan   Problems Addressed this Visit    None  Diagnoses    None.                Education provided in AVS   No follow-ups on file.

## 2024-10-31 LAB
BACTERIA UR CULT: ABNORMAL
BACTERIA UR CULT: ABNORMAL
OTHER ANTIBIOTIC SUSC ISLT: ABNORMAL

## 2024-11-01 ENCOUNTER — TELEPHONE (OUTPATIENT)
Dept: ORTHOPEDIC SURGERY | Facility: CLINIC | Age: 59
End: 2024-11-01
Payer: MEDICAID

## 2024-11-01 ENCOUNTER — HOSPITAL ENCOUNTER (OUTPATIENT)
Dept: MAMMOGRAPHY | Facility: HOSPITAL | Age: 59
Discharge: HOME | End: 2024-11-01
Payer: MEDICAID

## 2024-11-01 DIAGNOSIS — M77.01 GOLFER'S ELBOW, RIGHT: ICD-10-CM

## 2024-11-01 DIAGNOSIS — Z12.31 ENCOUNTER FOR SCREENING MAMMOGRAM FOR MALIGNANT NEOPLASM OF BREAST: ICD-10-CM

## 2024-11-01 DIAGNOSIS — M77.11 LATERAL EPICONDYLITIS OF RIGHT ELBOW: Primary | ICD-10-CM

## 2024-11-01 PROCEDURE — 77067 SCR MAMMO BI INCL CAD: CPT

## 2024-11-01 PROCEDURE — 77063 BREAST TOMOSYNTHESIS BI: CPT

## 2024-11-01 NOTE — TELEPHONE ENCOUNTER
Caller: Demi Leija    Relationship to patient: Self    Best call back number: [phone]    Patient is needing: PT IS WANTING TO KNOW IF DR PHILLIPS COULD SET UP THE PLATELET INJECTION FOR THE RIGHT ELBOW FOR HER.. PLEASE ADVISE..

## 2024-11-01 NOTE — TELEPHONE ENCOUNTER
Call returned to the patient.  She is decided she would like to proceed with the platelet injection of her elbow.  Have discussed with her that Dr. Lira would recommend an sending her to Dr. Velasquez or Dr. Rosado.  Patient is requesting referral to Dr. Velasquez.  Have advised her that their office will contact her to set up the appointment

## 2024-11-04 ENCOUNTER — TELEPHONE (OUTPATIENT)
Dept: OBSTETRICS AND GYNECOLOGY | Facility: CLINIC | Age: 59
End: 2024-11-04
Payer: MEDICAID

## 2024-11-04 DIAGNOSIS — N63.20 MASS OF LEFT BREAST, UNSPECIFIED QUADRANT: Primary | ICD-10-CM

## 2024-11-04 DIAGNOSIS — N63.20 MASS OF LEFT BREAST, UNSPECIFIED QUADRANT: ICD-10-CM

## 2024-11-04 DIAGNOSIS — Z79.890 HORMONE REPLACEMENT THERAPY (HRT): ICD-10-CM

## 2024-11-04 DIAGNOSIS — K64.4 EXTERNAL HEMORRHOIDS: Primary | ICD-10-CM

## 2024-11-04 NOTE — TELEPHONE ENCOUNTER
Pt would like a call back for her mammogram results.     Also pt requesting refill on rx estradiol / testosterone cream refilled. Pt said she does not want the gel inserts she would like the cream.       Pharmacy [facility] -   [facility] - [place], [place] - [address]       Please advise, Thank you

## 2024-11-05 ENCOUNTER — TELEPHONE (OUTPATIENT)
Dept: OBSTETRICS AND GYNECOLOGY | Facility: CLINIC | Age: 59
End: 2024-11-05
Payer: MEDICAID

## 2024-11-05 NOTE — TELEPHONE ENCOUNTER
----- Message from Stephania LATHAM sent at 11/4/2024  9:22 AM EST -----  VM left for pt to call for results, order placed for L breast US

## 2024-11-05 NOTE — TELEPHONE ENCOUNTER
Called pt today to make sure her questions were answered about mammogram results.  She had spoke it Lenora on Mon.  Pt will call   to schedule for the Breast US.

## 2024-11-06 ENCOUNTER — TELEPHONE (OUTPATIENT)
Dept: GASTROENTEROLOGY | Facility: CLINIC | Age: 59
End: 2024-11-06
Payer: MEDICAID

## 2024-11-06 DIAGNOSIS — R19.7 DIARRHEA, UNSPECIFIED TYPE: Primary | ICD-10-CM

## 2024-11-06 NOTE — TELEPHONE ENCOUNTER
Returned patient's phone call. Reports last week she was put on Fluconazole and Cipro for her 5th UTI this year.     She states the liquid stool started last Tuesday and has several diarrhea stool daily. Patient is on bland/BRAT diet.     Patient is leaving Friday for vacation. Update to Edna.

## 2024-11-06 NOTE — TELEPHONE ENCOUNTER
Hub staff attempted to follow warm transfer process and was unsuccessful     Caller: Demi Leija.    Relationship to patient: Self    Best call back number:  [phone]    Patient is needing: PT IS LEAVING [place] FRIDAY MORNING AND IS HAVING A COLITIS FLARE UP. PLEASE CALL AND ADVISE.

## 2024-11-06 NOTE — TELEPHONE ENCOUNTER
Have her come in and do lab work and stool testing which I have ordered.  Start Arnoldo' colon health probiotics in the interim.

## 2024-11-07 LAB
ALBUMIN SERPL-MCNC: 4.6 G/DL (ref 3.5–5.2)
ALBUMIN/GLOB SERPL: 1.8 G/DL
ALP SERPL-CCNC: 74 U/L (ref 39–117)
ALT SERPL-CCNC: 19 U/L (ref 1–33)
AST SERPL-CCNC: 20 U/L (ref 1–32)
BILIRUB SERPL-MCNC: 0.9 MG/DL (ref 0–1.2)
BUN SERPL-MCNC: 19 MG/DL (ref 6–20)
BUN/CREAT SERPL: 16.8 (ref 7–25)
CALCIUM SERPL-MCNC: 9.5 MG/DL (ref 8.6–10.5)
CHLORIDE SERPL-SCNC: 101 MMOL/L (ref 98–107)
CO2 SERPL-SCNC: 25.6 MMOL/L (ref 22–29)
CREAT SERPL-MCNC: 1.13 MG/DL (ref 0.57–1)
EGFRCR SERPLBLD CKD-EPI 2021: 56.5 ML/MIN/1.73
ERYTHROCYTE [DISTWIDTH] IN BLOOD BY AUTOMATED COUNT: 12 % (ref 12.3–15.4)
GLOBULIN SER CALC-MCNC: 2.5 GM/DL
GLUCOSE SERPL-MCNC: 96 MG/DL (ref 65–99)
HCT VFR BLD AUTO: 46.6 % (ref 34–46.6)
HGB BLD-MCNC: 15.7 G/DL (ref 12–15.9)
MCH RBC QN AUTO: 31.2 PG (ref 26.6–33)
MCHC RBC AUTO-ENTMCNC: 33.7 G/DL (ref 31.5–35.7)
MCV RBC AUTO: 92.5 FL (ref 79–97)
PLATELET # BLD AUTO: 330 10*3/MM3 (ref 140–450)
POTASSIUM SERPL-SCNC: 5.1 MMOL/L (ref 3.5–5.2)
PROT SERPL-MCNC: 7.1 G/DL (ref 6–8.5)
RBC # BLD AUTO: 5.04 10*6/MM3 (ref 3.77–5.28)
SODIUM SERPL-SCNC: 137 MMOL/L (ref 136–145)
TSH SERPL DL<=0.005 MIU/L-ACNC: 2.77 UIU/ML (ref 0.27–4.2)
WBC # BLD AUTO: 7.32 10*3/MM3 (ref 3.4–10.8)

## 2024-11-08 LAB — C DIFF TOX A+B STL QL IA: NORMAL

## 2024-11-08 NOTE — PROGRESS NOTES
Please inform the patient lab work shows no evidence of anemia.  Renal function is slightly elevated but at baseline.  Drink plenty of water and follow-up primary care provider for that.  Normal liver function test.  Normal thyroid function.  Please follow-up on her stool testing looks like it was received after he had been refrigerated.  Probably needs to be repeated.

## 2024-11-10 LAB — CALPROTECTIN STL-MCNT: 39 UG/G (ref 0–120)

## 2024-11-11 ENCOUNTER — TELEPHONE (OUTPATIENT)
Dept: GASTROENTEROLOGY | Facility: CLINIC | Age: 59
End: 2024-11-11
Payer: MEDICAID

## 2024-11-11 DIAGNOSIS — R19.7 DIARRHEA OF PRESUMED INFECTIOUS ORIGIN: Primary | ICD-10-CM

## 2024-11-11 NOTE — TELEPHONE ENCOUNTER
----- Message from Edna Montilla sent at 11/8/2024  4:06 PM EST -----  Please inform the patient lab work shows no evidence of anemia.  Renal function is slightly elevated but at baseline.  Drink plenty of water and follow-up primary care provider for that.  Normal liver function test.  Normal thyroid function.  Please   follow-up on her stool testing looks like it was received after he had been refrigerated.  Probably needs to be repeated.

## 2024-11-18 ENCOUNTER — OFFICE VISIT (OUTPATIENT)
Dept: SPORTS MEDICINE | Facility: CLINIC | Age: 59
End: 2024-11-18
Payer: MEDICAID

## 2024-11-18 ENCOUNTER — OFFICE VISIT (OUTPATIENT)
Dept: ORTHOPEDIC SURGERY | Facility: CLINIC | Age: 59
End: 2024-11-18
Payer: MEDICAID

## 2024-11-18 VITALS — HEIGHT: 66 IN | TEMPERATURE: 98 F | WEIGHT: 141.7 LBS | BODY MASS INDEX: 22.77 KG/M2

## 2024-11-18 VITALS
WEIGHT: 133 LBS | DIASTOLIC BLOOD PRESSURE: 60 MMHG | HEIGHT: 66 IN | SYSTOLIC BLOOD PRESSURE: 118 MMHG | RESPIRATION RATE: 16 BRPM | OXYGEN SATURATION: 98 % | BODY MASS INDEX: 21.38 KG/M2 | HEART RATE: 66 BPM

## 2024-11-18 DIAGNOSIS — M70.61 TROCHANTERIC BURSITIS OF RIGHT HIP: Primary | ICD-10-CM

## 2024-11-18 DIAGNOSIS — M25.521 RIGHT ELBOW PAIN: Primary | ICD-10-CM

## 2024-11-18 DIAGNOSIS — M77.11 RIGHT LATERAL EPICONDYLITIS: ICD-10-CM

## 2024-11-18 PROCEDURE — 1159F MED LIST DOCD IN RCRD: CPT | Performed by: FAMILY MEDICINE

## 2024-11-18 PROCEDURE — 99213 OFFICE O/P EST LOW 20 MIN: CPT | Performed by: FAMILY MEDICINE

## 2024-11-18 PROCEDURE — 1160F RVW MEDS BY RX/DR IN RCRD: CPT | Performed by: FAMILY MEDICINE

## 2024-11-18 RX ADMIN — LIDOCAINE HYDROCHLORIDE 2 ML: 10 INJECTION, SOLUTION EPIDURAL; INFILTRATION; INTRACAUDAL; PERINEURAL at 16:33

## 2024-11-18 RX ADMIN — METHYLPREDNISOLONE ACETATE 80 MG: 80 INJECTION, SUSPENSION INTRA-ARTICULAR; INTRALESIONAL; INTRAMUSCULAR; SOFT TISSUE at 16:33
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
## 2024-11-19 RX ORDER — METHYLPREDNISOLONE ACETATE 80 MG/ML
80 INJECTION, SUSPENSION INTRA-ARTICULAR; INTRALESIONAL; INTRAMUSCULAR; SOFT TISSUE
Status: COMPLETED | OUTPATIENT
Start: 2024-11-18 | End: 2024-11-18

## 2024-11-19 RX ORDER — LIDOCAINE HYDROCHLORIDE 10 MG/ML
2 INJECTION, SOLUTION EPIDURAL; INFILTRATION; INTRACAUDAL; PERINEURAL
Status: COMPLETED | OUTPATIENT
Start: 2024-11-18 | End: 2024-11-18

## 2024-11-20 ENCOUNTER — TELEPHONE (OUTPATIENT)
Dept: SURGERY | Facility: CLINIC | Age: 59
End: 2024-11-20

## 2024-11-20 NOTE — TELEPHONE ENCOUNTER
Caller: Demi Leija    Relationship:  Self    Best call back number: [phone] (home)       PATIENT CALLED REQUESTING TO CANCEL SAME DAY APPT.    Did the patient call AFTER the start time of their scheduled appointment?  []YES  [x]NO    Was the patient's appointment rescheduled? [x]YES  []NO    Any additional information: PATIENT CALLED TO RESCHEDULE DUE TO MIGRAINE

## 2024-11-21 ENCOUNTER — TELEPHONE (OUTPATIENT)
Dept: GASTROENTEROLOGY | Facility: CLINIC | Age: 59
End: 2024-11-21
Payer: MEDICAID

## 2024-11-21 ENCOUNTER — OFFICE VISIT (OUTPATIENT)
Dept: SURGERY | Facility: CLINIC | Age: 59
End: 2024-11-21
Payer: MEDICAID

## 2024-11-21 VITALS
SYSTOLIC BLOOD PRESSURE: 132 MMHG | HEIGHT: 66 IN | OXYGEN SATURATION: 98 % | DIASTOLIC BLOOD PRESSURE: 100 MMHG | BODY MASS INDEX: 22.28 KG/M2 | WEIGHT: 138.6 LBS | HEART RATE: 72 BPM

## 2024-11-21 DIAGNOSIS — K64.4 ANAL SKIN TAG: Primary | ICD-10-CM

## 2024-11-21 DIAGNOSIS — K52.831 COLLAGENOUS COLITIS: ICD-10-CM

## 2024-11-21 PROCEDURE — 46600 DIAGNOSTIC ANOSCOPY SPX: CPT | Performed by: PHYSICIAN ASSISTANT

## 2024-11-21 PROCEDURE — 1159F MED LIST DOCD IN RCRD: CPT | Performed by: PHYSICIAN ASSISTANT

## 2024-11-21 PROCEDURE — 99204 OFFICE O/P NEW MOD 45 MIN: CPT | Performed by: PHYSICIAN ASSISTANT

## 2024-11-21 PROCEDURE — 1160F RVW MEDS BY RX/DR IN RCRD: CPT | Performed by: PHYSICIAN ASSISTANT

## 2024-11-21 RX ORDER — HYDROCORTISONE 25 MG/G
CREAM TOPICAL
Qty: 30 G | Refills: 1 | Status: SHIPPED | OUTPATIENT
Start: 2024-11-21

## 2024-11-21 NOTE — TELEPHONE ENCOUNTER
Her creatinine is an indicator of her overall renal function status.  That is why I wanted her to follow-up with her primary care provider and drink more water to make sure she is adequately hydrated.

## 2024-11-21 NOTE — TELEPHONE ENCOUNTER
Message from pt via [employer]:    With Provider: [facility]IM[facility] [[facility]]  Preferred Date Range: 11/25/2024 - 12/6/2024  Preferred Times: Any Time  Reason for Visit: Follow-up  Comments: Would like to talk about my test results from November. What does high Creatine mean. Welcome to call me to discuss. Seems Creatine has been high since April's test. Thank you.     Please call patient to discuss.

## 2024-11-21 NOTE — PROGRESS NOTES
History of Present Illness  The patient is a 58-year-old female presenting as a new patient for evaluation of hemorrhoids.    She underwent a successful hemorrhoidectomy approximately 15 years ago. However, she has been experiencing a small external hemorrhoid for the past 4 to 5 years. The hemorrhoid remains constant, with occasional enlargement. She occasionally experiences itching and uses hemorrhoid wipes and an over-the-counter hemorrhoidal ointment for relief. She reports no bleeding or pain in the anal rectal area.    She maintains hygiene after bowel movements by showering and using wipes. She has a daily bowel movement and does not suffer from constipation. She does not take any fiber supplements.    She has been diagnosed with large intestinal colitis, which is generally well-managed. She has been tested for celiac disease and is currently on a gluten-free diet. Additionally, she takes a probiotic with cranberry substance.     Past Medical History:   Diagnosis Date    Acquired absence of other specified parts of digestive tract     Asthma     Chronic serous otitis media     Colitis     Crohn disease     Diarrhea, unspecified     Gastroesophageal reflux disease 10/11/2023    Headache     Hypothyroidism     Lumbar herniated disc     Pain in joints of unspecified hand     Post-menopause     Sciatica associated with disorder of lumbar spine     Tattoos        Past Surgical History:   Procedure Laterality Date    ABDOMINOPLASTY  2012    mini    BUNIONECTOMY Bilateral 2010    CHOLECYSTECTOMY  2021    COLONOSCOPY  10/2021    FOOT FRACTURE SURGERY Left     great toe    HEMORRHOIDECTOMY  2010    NOSE SURGERY      RHINOPLASTY  1995    STOMACH SURGERY      TOE SURGERY      TONSILLECTOMY      WISDOM TOOTH EXTRACTION         Social History:   reports that she has never smoked. She has never been exposed to tobacco smoke. She has never used smokeless tobacco. She reports current alcohol use of about 6.0 standard drinks  of alcohol per week. She reports current drug use. Drug: Marijuana.      Marriage status: Single    Family History   Problem Relation Age of Onset    Diabetes Mother     Arthritis Mother     Stroke Mother     Arthritis Father     Cancer Father         colon    Stroke Father     Colon cancer Father     Diabetes Father     Hypertension Sister     Cancer Brother         prostate    Stroke Brother         x 2    Hypertension Brother     Colon polyps Brother     SARA disease Brother     Irritable bowel syndrome Brother         Brothers    Prostate cancer Brother     Hypertension Brother     Stroke Brother     Prostate cancer Brother     Stroke Brother     Hypertension Brother     Stroke Maternal Grandmother     Diabetes Maternal Grandmother     Colon cancer Maternal Grandmother     Stroke Paternal Grandmother     Hypertension Other          Current Outpatient Medications:     albuterol sulfate  (90 Base) MCG/ACT inhaler, Inhale 2 puffs Every 4 (Four) Hours As Needed for Wheezing., Disp: 18 g, Rfl: 3    cyclobenzaprine (FLEXERIL) 10 MG tablet, Take 1 tablet by mouth 3 (Three) Times a Day As Needed for Muscle Spasms., Disp: 60 tablet, Rfl: 0    estradiol (ESTRACE VAGINAL) 0.1 MG/GM vaginal cream, Insert 2 g into the vagina Daily., Disp: 42.5 g, Rfl: 12    Estradiol 10 % cream, APPLY TWO clicks topically TWICE DAILY, Disp: , Rfl:     fluticasone (FLONASE) 50 MCG/ACT nasal spray, 2 sprays into the nostril(s) as directed by provider Daily., Disp: 18.2 mL, Rfl: 5    hydrOXYzine (ATARAX) 25 MG tablet, TAKE 1 TABLET BY MOUTH EVERY NIGHT AT BEDTIME, Disp: 90 tablet, Rfl: 1    Progesterone (PROMETRIUM) 200 MG capsule, Take 1 capsule by mouth Daily., Disp: 90 capsule, Rfl: 3    vitamin E 100 UNIT capsule, Take 4 capsules by mouth Daily., Disp: 30 capsule, Rfl: 11    Allergy  Nylon    Vitals:    11/21/24 0953   BP: 132/100   Pulse: 72   SpO2: 98%   -  Body mass index is 22.37 kg/m².    Physical Exam     No acute  distress  Chaperone present  Perianal exam: external hemorrhoids not enlarged. Very small skin tag in right posterior position. JULIANNE: no masses. Outpouching anteriorly consistent with rectocele. Anoscopy performed: normal internal hemorrhoids    Results  Colonoscopy November 2020:  Colonoscopy & pathology reports reviewed: one 2 mm polyp in the rectum and nonbleeding internal hemorrhoids. Biopsy of the ileum showed small intestinal intraepithelial lymphocytosis. Colon biopsy showed collagenous colitis. The rectal polyp was hyperplastic.     Assessment & Plan  1. Anal skin tag.  The presence of a skin tag was noted, likely due to intermittent enlargement of the hemorrhoids. No current enlargement of the hemorrhoids was observed. Excision of the skin tag would not likely be of benefit, due to the small size of the skin tag and the risk of scarring after excision. A daily fiber supplement was recommended to help solidify stools. Some fiber supplements come with a probiotic, which may be beneficial. Over-the-counter treatments such as Aquaphor, Vaseline, or zinc oxide-based diaper rash cream were suggested for skin irritation and itching. A prescription for 2.5% hydrocortisone cream was provided for use as needed for hemorrhoid flares. The prescription was sent to [facility] at [facility]. Detailed written instructions for fiber supplementation were provided.    Follow up as needed.       Patient or patient representative verbalized consent for the use of Ambient Listening during the visit with  Yamileth Harper PA-C for chart documentation. 11/21/2024  10:26 NANCY Harper PA-C  Physician Assistant  Colorectal Surgery

## 2024-11-22 ENCOUNTER — PATIENT ROUNDING (BHMG ONLY) (OUTPATIENT)
Dept: SURGERY | Facility: CLINIC | Age: 59
End: 2024-11-22
Payer: MEDICAID

## 2024-11-22 ENCOUNTER — PATIENT ROUNDING (BHMG ONLY) (OUTPATIENT)
Dept: SPORTS MEDICINE | Facility: CLINIC | Age: 59
End: 2024-11-22
Payer: MEDICAID

## 2024-11-22 NOTE — PROGRESS NOTES
November 22, 2024      I am calling to officially welcome you to our practice and ask about your recent visit. Is this a good time to talk? No. Left voicemail to call back

## 2024-11-22 NOTE — TELEPHONE ENCOUNTER
Called pt and left detailed msg on identified vm advising of FELICIA Bishop's note.  Advised to call back if any questions.

## 2024-11-22 NOTE — PROGRESS NOTES
November 22, 2024      A 7digital Message has been sent to the patient for PATIENT ROUNDING with [facility]

## 2024-11-25 ENCOUNTER — TELEPHONE (OUTPATIENT)
Dept: FAMILY MEDICINE CLINIC | Facility: CLINIC | Age: 59
End: 2024-11-25

## 2024-11-25 NOTE — TELEPHONE ENCOUNTER
Caller: Demi Leija    Relationship: Self    Best call back number: [phone]    What test was performed: REPEAT LABS FROM [facility]      When was the test performed: 11/07    [facility] the [facility] performed[facility]    Additional notes: PATIENT STATES CREATININE  LEVEL IS STILL HIGH.

## 2024-11-26 DIAGNOSIS — N18.30 STAGE 3 CHRONIC KIDNEY DISEASE, UNSPECIFIED WHETHER STAGE 3A OR 3B CKD: ICD-10-CM

## 2024-11-26 DIAGNOSIS — R79.89 ELEVATED SERUM CREATININE: Primary | ICD-10-CM

## 2024-11-29 ENCOUNTER — HOSPITAL ENCOUNTER (OUTPATIENT)
Dept: ULTRASOUND IMAGING | Facility: HOSPITAL | Age: 59
Discharge: HOME | End: 2024-11-29
Payer: MEDICAID

## 2024-11-29 DIAGNOSIS — N63.20 MASS OF LEFT BREAST, UNSPECIFIED QUADRANT: ICD-10-CM

## 2024-11-29 PROCEDURE — 76642 ULTRASOUND BREAST LIMITED: CPT

## 2024-12-11 RX ORDER — PROGESTERONE 200 MG/1
200 CAPSULE ORAL DAILY
Qty: 90 CAPSULE | Refills: 3 | Status: SHIPPED | OUTPATIENT
Start: 2024-12-11

## 2024-12-11 NOTE — TELEPHONE ENCOUNTER
Pt is also requesting Estradiol refill be sent to [facility].  This was sent/faxed 11/1 by [facility], but pt states if MD sends she will not have to keep doing monthly refill, she can get a yearly refill    Can you please confirm if you are able to do this for pt? Thank you!

## 2024-12-11 NOTE — TELEPHONE ENCOUNTER
That was because I had foot surgery and at the hospital the right leg did not have the pressurized pump thing working. It was minor, I was seen by a specialist & released from any further issues. Regarding the estradiol/testosterone. Thank you

## 2024-12-11 NOTE — TELEPHONE ENCOUNTER
Dr Mahmood does not recommend refilling hormone replacement for you due to history of a lower extremity deep vein thrombosis in 2023. It is a health risk for you. My Chart message sent.

## 2024-12-19 ENCOUNTER — TELEPHONE (OUTPATIENT)
Dept: FAMILY MEDICINE CLINIC | Facility: CLINIC | Age: 59
End: 2024-12-19
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
## 2024-12-20 ENCOUNTER — TELEPHONE (OUTPATIENT)
Dept: OBSTETRICS AND GYNECOLOGY | Facility: CLINIC | Age: 59
End: 2024-12-20
Payer: MEDICAID

## 2024-12-20 DIAGNOSIS — E06.3 HASHIMOTO'S DISEASE: Primary | ICD-10-CM

## 2024-12-20 NOTE — TELEPHONE ENCOUNTER
Pt requesting a call back from Lenora.   Pt states its regarding HRT and would like to discuss phasing out.   Pt did not want to give me any other information.      Please advise, Thank you

## 2024-12-23 ENCOUNTER — OFFICE VISIT (OUTPATIENT)
Dept: FAMILY MEDICINE CLINIC | Facility: CLINIC | Age: 59
End: 2024-12-23
Payer: MEDICAID

## 2024-12-23 VITALS
OXYGEN SATURATION: 99 % | DIASTOLIC BLOOD PRESSURE: 102 MMHG | SYSTOLIC BLOOD PRESSURE: 140 MMHG | WEIGHT: 138 LBS | RESPIRATION RATE: 18 BRPM | HEIGHT: 66 IN | BODY MASS INDEX: 22.18 KG/M2 | HEART RATE: 82 BPM

## 2024-12-23 DIAGNOSIS — E06.3 HASHIMOTO'S DISEASE: ICD-10-CM

## 2024-12-23 DIAGNOSIS — R68.2 DRY MOUTH: ICD-10-CM

## 2024-12-23 DIAGNOSIS — H92.02 LEFT EAR PAIN: ICD-10-CM

## 2024-12-23 DIAGNOSIS — J06.9 VIRAL URI: Primary | ICD-10-CM

## 2024-12-23 PROCEDURE — 99213 OFFICE O/P EST LOW 20 MIN: CPT | Performed by: NURSE PRACTITIONER

## 2024-12-23 PROCEDURE — 1126F AMNT PAIN NOTED NONE PRSNT: CPT | Performed by: NURSE PRACTITIONER

## 2024-12-23 PROCEDURE — 1159F MED LIST DOCD IN RCRD: CPT | Performed by: NURSE PRACTITIONER

## 2024-12-23 PROCEDURE — 1160F RVW MEDS BY RX/DR IN RCRD: CPT | Performed by: NURSE PRACTITIONER

## 2024-12-23 RX ORDER — ESCITALOPRAM OXALATE 10 MG/1
10 TABLET ORAL DAILY
Qty: 30 TABLET | Refills: 1 | Status: CANCELLED | OUTPATIENT
Start: 2024-12-23

## 2024-12-23 RX ORDER — ESCITALOPRAM OXALATE 10 MG/1
10 TABLET ORAL DAILY
Qty: 30 TABLET | Refills: 2 | Status: SHIPPED | OUTPATIENT
Start: 2024-12-23 | End: 2025-12-23

## 2024-12-23 NOTE — PROGRESS NOTES
Subjective   Demi Leija is a 59 y.o. female.     History of Present Illness     The following portions of the patient's history were reviewed and updated as appropriate: allergies, current medications, past family history, past medical history, past social history, past surgical history and problem list.       The patient is a 59-year-old female who presents with right ear pain.    She began experiencing cold symptoms yesterday, which she has been managing with over-the-counter cold and flu medication. She reports no nasal discharge but mentions a mild sore throat. Her daughter had a severe head cold approximately 1.5 weeks ago. She also experienced a headache for two consecutive days, which was alleviated with Tylenol or ibuprofen. She has been using Flonase nasal spray for her cold symptoms. She used a throat lozenge last night to manage her sore throat.    She has been diagnosed with Hashimoto's disease and reports feels like  a lump in her throat. She experiences severe dry mouth at night, which disrupts her sleep. Has tested negative for Sjogren's. Despite using Biotene and other recommended throat products, she finds no relief. She admits to excessive fluid intake during the day. Has had low sodium.     MEDICATIONS  Current: Flonase, Biotene, Tylenol, ibuprofen         Review of Systems        Current Outpatient Medications:     albuterol sulfate  (90 Base) MCG/ACT inhaler, Inhale 2 puffs Every 4 (Four) Hours As Needed for Wheezing., Disp: 18 g, Rfl: 3    cyclobenzaprine (FLEXERIL) 10 MG tablet, Take 1 tablet by mouth 3 (Three) Times a Day As Needed for Muscle Spasms., Disp: 60 tablet, Rfl: 0    estradiol (ESTRACE VAGINAL) 0.1 MG/GM vaginal cream, Insert 2 g into the vagina Daily., Disp: 42.5 g, Rfl: 12    Estradiol 10 % cream, APPLY TWO clicks topically TWICE DAILY, Disp: , Rfl:     fluticasone (FLONASE) 50 MCG/ACT nasal spray, 2 sprays into the nostril(s) as directed by provider Daily., Disp:  18.2 mL, Rfl: 5    Hydrocortisone, Perianal, (Anusol-HC) 2.5 % rectal cream, Apply to hemorrhoids 3 times daily for 7 days during hemorrhoid flare. Include applicator. Use for 7 days, then take a break for 7 days before resuming this pattern., Disp: 30 g, Rfl: 1    hydrOXYzine (ATARAX) 25 MG tablet, TAKE 1 TABLET BY MOUTH EVERY NIGHT AT BEDTIME, Disp: 90 tablet, Rfl: 1    Progesterone (PROMETRIUM) 200 MG capsule, TAKE 1 CAPSULE BY MOUTH DAILY, Disp: 90 capsule, Rfl: 3    vitamin E 100 UNIT capsule, Take 4 capsules by mouth Daily., Disp: 30 capsule, Rfl: 11    Objective   Physical Exam  Constitutional:       Appearance: Normal appearance.   HENT:      Right Ear: Tympanic membrane normal.      Left Ear: Tympanic membrane normal.      Mouth/Throat:      Mouth: Mucous membranes are moist.      Pharynx: Posterior oropharyngeal erythema present.   Eyes:      Pupils: Pupils are equal, round, and reactive to light.   Cardiovascular:      Rate and Rhythm: Normal rate and regular rhythm.      Pulses: Normal pulses.   Pulmonary:      Effort: Pulmonary effort is normal.      Breath sounds: Normal breath sounds. No wheezing.   Lymphadenopathy:      Cervical: Cervical adenopathy present.   Skin:     General: Skin is warm.   Neurological:      General: No focal deficit present.      Mental Status: She is alert.         Vitals:    12/23/24 1042   BP: (!) 140/102   Pulse: 82   Resp: 18   SpO2: 99%     Body mass index is 22.27 kg/m².    Procedures    TSH   Date Value Ref Range Status   11/07/2024 2.770 0.270 - 4.200 uIU/mL Final     Hemoglobin A1C   Date Value Ref Range Status   09/17/2024 5.30 4.80 - 5.60 % Final     Comment:     Hemoglobin A1C Ranges:  Increased Risk for Diabetes  5.7% to 6.4%  Diabetes                     >= 6.5%  Diabetic Goal                < 7.0%                     Assessment & Plan   Problems Addressed this Visit    None  Visit Diagnoses       Viral URI    -  Primary    Left ear pain        Dry mouth         Hashimoto's disease              Diagnoses         Codes Comments    Viral URI    -  Primary ICD-10-CM: J06.9  ICD-9-CM: 465.9     Left ear pain     ICD-10-CM: H92.02  ICD-9-CM: 388.70     Dry mouth     ICD-10-CM: R68.2  ICD-9-CM: 527.7     Hashimoto's disease     ICD-10-CM: E06.3  ICD-9-CM: 245.2           No orders of the defined types were placed in this encounter.         1. Right otalgia.  The patient's right ear pain may be referred from her throat, which is notably erythematous. There is clear fluid in the ear, but no infection is present. A swab test will be conducted to rule out streptococcal pharyngitis, this is negative. She is advised to use Flonase nasal spray once daily to reduce fluid accumulation in the ears. Rest, hydrate, zinc, vitamin C    2. Hashimoto's.  The patient has antibodies consistent with Hashimoto's thyroiditis. She reports symptoms including dry mouth and a lump in her throat. She is advised to continue using Biotene for dry mouth and to consider a mouth spray , lozenges for additional relief. Follow up w michel, has been referred            Education provided in AVS   Return if symptoms worsen or fail to improve.    Patient or patient representative verbalized consent for the use of Ambient Listening during the visit with  CAL Calloway for chart documentation. 12/23/2024  11:01 EST

## 2024-12-23 NOTE — TELEPHONE ENCOUNTER
Lenora pt would like to start low dose of Lexapro please.    Also wants to know if there is anything you can send/recommend for vaginal dryness that is NOT to be inserted.     Pharmacy -   [facility] [id number] - [place], [place] - [address] [facility] [address] - [phone]  - [phone]        Please advise, thank you!

## 2025-01-08 ENCOUNTER — OFFICE VISIT (OUTPATIENT)
Dept: ENDOCRINOLOGY | Age: 60
End: 2025-01-08

## 2025-01-08 VITALS
HEIGHT: 66 IN | WEIGHT: 136 LBS | HEART RATE: 72 BPM | DIASTOLIC BLOOD PRESSURE: 76 MMHG | OXYGEN SATURATION: 100 % | BODY MASS INDEX: 21.86 KG/M2 | SYSTOLIC BLOOD PRESSURE: 116 MMHG

## 2025-01-08 DIAGNOSIS — E06.3 HASHIMOTO'S DISEASE: Primary | ICD-10-CM

## 2025-01-08 RX ORDER — MULTIPLE VITAMINS W/ MINERALS TAB 9MG-400MCG
1 TAB ORAL DAILY
COMMUNITY

## 2025-01-08 RX ORDER — BERBERINE CHLOR/SEAWEED/CHROM 500-250 MG
CAPSULE ORAL
COMMUNITY

## 2025-01-08 RX ORDER — DULOXETIN HYDROCHLORIDE 30 MG/1
30 CAPSULE, DELAYED RELEASE ORAL DAILY
COMMUNITY

## 2025-01-08 RX ORDER — UBIDECARENONE 75 MG
50 CAPSULE ORAL DAILY
COMMUNITY

## 2025-01-08 NOTE — PROGRESS NOTES
Referring provider: CAL Calloway     Chief complaint/Reason for consult: Hashimoto's thyroiditis    HPI:   - 59 year old female here for Hashimoto's thryoiditis  - No family history of thyroid disease  - Complains of hair loss and increased urination    The following portions of the patient's history were reviewed and updated as appropriate: allergies, current medications, past family history, past medical history, past social history, past surgical history, and problem list.      Objective     Vitals:    01/08/25 1135   BP: 116/76   Pulse: 72   SpO2: 100%        Physical Exam  Vitals reviewed.   Constitutional:       Appearance: Normal appearance.   HENT:      Head: Normocephalic and atraumatic.   Eyes:      General: No scleral icterus.  Pulmonary:      Effort: Pulmonary effort is normal. No respiratory distress.   Neurological:      Mental Status: She is alert.      Gait: Gait normal.   Psychiatric:         Mood and Affect: Mood normal.         Behavior: Behavior normal.         Thought Content: Thought content normal.         Judgment: Judgment normal.         Assessment & Plan   Hashimoto's thyroiditis  - Patient with positive TPO antbodies indicating Hashimoto's thyroiditis  - At this time her TSH and free T4 are normal so she does not need to start any treatment at this time  - She is at risk for developing hypothyroidism in the future so would recommend repeat TSH every 6-12 months or for new symptoms consistent with hypothyroidism     - Return to clinic PRN

## 2025-01-22 ENCOUNTER — TELEPHONE (OUTPATIENT)
Dept: FAMILY MEDICINE CLINIC | Facility: CLINIC | Age: 60
End: 2025-01-22

## 2025-01-22 NOTE — TELEPHONE ENCOUNTER
Caller: Demi Leija     Relationship: [unfilled]     Best call back number: [phone]     What is your medical concern? PATIENT STATES THAT SHE HAS HAD EXTREME THIRST, SWEET SMELLING URINE, AND A HEADACHE FOR A COUPLE MONTHS BUT THE LAST 3 DAYS IT HAS GOTTEN WORSE.    PATIENT IS STILL WAITING TO SEE KIDNEY SPECIALIST AND CAN'T REACH THEIR OFFICE    Is your provider already aware of this issue? YES    Have you been treated for this issue? YES

## 2025-01-23 ENCOUNTER — TELEPHONE (OUTPATIENT)
Dept: GASTROENTEROLOGY | Facility: CLINIC | Age: 60
End: 2025-01-23
Payer: MEDICAID

## 2025-01-23 NOTE — TELEPHONE ENCOUNTER
Caller: Demi Leija    Relationship: Self    Best call back number: [phone]    What is the best time to reach you: ANYTIME    Who are you requesting to speak with (clinical staff, provider,  specific staff member): CLINICAL STAFF         What was the call regarding: PT IS WANTING A CALL BACK TO SPEAK ABOUT SOME MEDICATION SHE POSSIBLY WANTS TO BE ON FOR NEW DIAGNOSIS

## 2025-01-23 NOTE — TELEPHONE ENCOUNTER
Patient called. Advised as per Edna's note. Patient is agreeable to see Edna on Wednesday 1/29@1430.     Update to Edna and Lisha.

## 2025-01-23 NOTE — TELEPHONE ENCOUNTER
Return patient's phone call. She states she feels she is in a colitis flare. She took some medication with Mg in it and has had diarrhea issues. She states she sent a my chart message yesterday and is awaiting a response so she does have to take steroids.

## 2025-02-05 ENCOUNTER — TELEPHONE (OUTPATIENT)
Dept: GASTROENTEROLOGY | Facility: CLINIC | Age: 60
End: 2025-02-05
Payer: MEDICAID

## 2025-02-05 ENCOUNTER — OFFICE VISIT (OUTPATIENT)
Dept: GASTROENTEROLOGY | Facility: CLINIC | Age: 60
End: 2025-02-05
Payer: MEDICAID

## 2025-02-05 VITALS
SYSTOLIC BLOOD PRESSURE: 116 MMHG | TEMPERATURE: 95.7 F | HEIGHT: 66 IN | DIASTOLIC BLOOD PRESSURE: 82 MMHG | HEART RATE: 75 BPM | WEIGHT: 133.2 LBS | BODY MASS INDEX: 21.41 KG/M2

## 2025-02-05 DIAGNOSIS — K52.831 COLLAGENOUS COLITIS: ICD-10-CM

## 2025-02-05 DIAGNOSIS — R10.30 LOWER ABDOMINAL PAIN: ICD-10-CM

## 2025-02-05 DIAGNOSIS — R19.7 DIARRHEA, UNSPECIFIED TYPE: Primary | ICD-10-CM

## 2025-02-05 PROCEDURE — 1159F MED LIST DOCD IN RCRD: CPT | Performed by: NURSE PRACTITIONER

## 2025-02-05 PROCEDURE — 99214 OFFICE O/P EST MOD 30 MIN: CPT | Performed by: NURSE PRACTITIONER

## 2025-02-05 PROCEDURE — 1160F RVW MEDS BY RX/DR IN RCRD: CPT | Performed by: NURSE PRACTITIONER

## 2025-02-05 RX ORDER — ERGOCALCIFEROL 1.25 MG/1
50000 CAPSULE, LIQUID FILLED ORAL
COMMUNITY

## 2025-02-05 RX ORDER — LISINOPRIL 10 MG/1
10 TABLET ORAL DAILY
COMMUNITY

## 2025-02-05 NOTE — TELEPHONE ENCOUNTER
ELISSA LOPEZ FOR COLON ON   03/28/2025 ARRIVE AT 1130AM Mailed Prep instructions to Mailing address on-file. OK FOR HUB TO READ

## 2025-02-05 NOTE — PROGRESS NOTES
"Chief Complaint   Patient presents with    Colitis, collagenous    Diarrhea       HPI    Demi Leija is a  59 y.o. female here for a follow up visit for diarrhea.    This patient follows with Dr. William and myself.    Past medical history of asthma, GERD, thyroid disease along with collagenous colitis.    Demi returns today complaining of episodic diarrhea triggered by taking an over-the-counter magnesium supplement and then eating \"goat meat.\"  Associated lower abdominal pain described as a cramping aching sensation that precedes defecation.  Abdominal pain response to antispasmodics well.  No rectal bleeding or rectal pain.  Diarrhea comes and goes throughout the week in between she will pass formed stool.  Her appetite has been good.  Her weight has fluctuated 5 to 10 pounds but overall has been stable.  She is taking as needed Imodium and Pepto when symptoms are severe.    No upper GI complaints.    Since last office visit she has been diagnosed with hypertension, Hashimoto's and prediabetes.  She is working with a functional medicine provider for non-GI issues.    Stool testing in the fall negative for infection.    Recent lab work reviewed with normal hemoglobin and TSH.    C-scope 2020 - polyp in the rectum.  Nonbleeding terminal hemorrhoids.  Recall 5 years. Path + collagenous colitis.     Past Medical History:   Diagnosis Date    Acquired absence of other specified parts of digestive tract     Asthma     Chronic serous otitis media     Colitis     Crohn disease     Diarrhea, unspecified     Gastroesophageal reflux disease 10/11/2023    Hashimoto's disease     Headache     Hypertension     Hypothyroidism     Lumbar herniated disc     Pain in joints of unspecified hand     Post-menopause     Sciatica associated with disorder of lumbar spine     Tattoos        Past Surgical History:   Procedure Laterality Date    ABDOMINOPLASTY  2012    mini    BUNIONECTOMY Bilateral 2010    CHOLECYSTECTOMY  2021    " COLONOSCOPY  10/2021    FOOT FRACTURE SURGERY Left     great toe    HEMORRHOIDECTOMY  2010    NOSE SURGERY      RHINOPLASTY  1995    STOMACH SURGERY      TOE SURGERY      TONSILLECTOMY      WISDOM TOOTH EXTRACTION         Scheduled Meds:     Continuous Infusions: No current facility-administered medications for this visit.      PRN Meds:     Allergies   Allergen Reactions    Nylon Unknown - Low Severity     Nylon stitches   will not dissolve causes irritation of skin        Social History     Socioeconomic History    Marital status: Single    Number of children: 2    Years of education: HIGH SCHOOL   Tobacco Use    Smoking status: Never     Passive exposure: Never    Smokeless tobacco: Never   Vaping Use    Vaping status: Never Used   Substance and Sexual Activity    Alcohol use: Yes     Alcohol/week: 6.0 standard drinks of alcohol     Types: 4 Glasses of wine, 2 Drinks containing 0.5 oz of alcohol per week    Drug use: Yes     Types: Marijuana     Comment: 4-5 times a year    Sexual activity: Yes     Partners: Male     Birth control/protection: None, Post-menopausal     Comment: Partner had a vasectomy       Family History   Problem Relation Age of Onset    Diabetes Mother     Arthritis Mother     Stroke Mother     Arthritis Father     Cancer Father         colon    Stroke Father     Colon cancer Father     Diabetes Father     Hypertension Sister     Cancer Brother         prostate    Stroke Brother         x 2    Hypertension Brother     Colon polyps Brother     SARA disease Brother     Irritable bowel syndrome Brother         Brothers    Prostate cancer Brother     Hypertension Brother     Stroke Brother     Prostate cancer Brother     Stroke Brother     Hypertension Brother     Stroke Maternal Grandmother     Diabetes Maternal Grandmother     Colon cancer Maternal Grandmother     Stroke Paternal Grandmother     Hypertension Other        Review of Systems   Gastrointestinal:  Positive for diarrhea.       Vitals:     02/05/25 0936   BP: 116/82   Pulse: 75   Temp: 95.7 °F (35.4 °C)       Physical Exam  Constitutional:       Appearance: She is well-developed.   Abdominal:      General: Bowel sounds are normal. There is no distension.      Palpations: Abdomen is soft. There is no mass.      Tenderness: There is no abdominal tenderness. There is no guarding.      Hernia: No hernia is present.   Skin:     General: Skin is warm and dry.      Capillary Refill: Capillary refill takes less than 2 seconds.   Neurological:      Mental Status: She is alert and oriented to person, place, and time.   Psychiatric:         Behavior: Behavior normal.     Assessment    Diagnoses and all orders for this visit:    1. Diarrhea, unspecified type (Primary)  Comments:  Episodic  Orders:  -     Case Request; Standing  -     Follow Anesthesia Guidelines / Protocol; Future  -     Case Request    2. Lower abdominal pain  Comments:  Episodic  Orders:  -     Case Request; Standing  -     Follow Anesthesia Guidelines / Protocol; Future  -     Case Request    3. Collagenous colitis  -     Case Request; Standing  -     Follow Anesthesia Guidelines / Protocol; Future  -     Case Request    Plan    Pleasant 59-year-old female seen today in follow-up for episodic lower abdominal discomfort and diarrhea with a history of collagenous colitis also due for screening colonoscopy.  Suspect element of IBS given episodic symptoms that have been triggered by certain foods and taking magnesium.  Recommend probiotics and as needed Pepto-Bismol moving forward.  Schedule colonoscopy with Dr. William for screening purposes and assess for evidence of collagenous colitis.  No indication for steroids at this time.  Further recommendations and follow-up pending procedural evaluation.         CAL Monzon  [facility]  [address] [place] [address]  Office: [phone]    I spent 30 minutes caring for Demi on this date of  service. This time includes time spent by me in the following activities: preparing for the visit, reviewing tests, obtaining and/or reviewing a separately obtained history, performing a medically appropriate examination and/or evaluation , counseling and educating the patient/family/caregiver, ordering medications, tests, or procedures, documenting information in the medical record, independently interpreting results and communicating that information with the patient/family/caregiver and care coordination.

## 2025-02-28 RX ORDER — CYCLOBENZAPRINE HCL 10 MG
10 TABLET ORAL 3 TIMES DAILY PRN
Qty: 60 TABLET | Refills: 0 | Status: SHIPPED | OUTPATIENT
Start: 2025-02-28

## 2025-03-11 ENCOUNTER — OFFICE VISIT (OUTPATIENT)
Dept: FAMILY MEDICINE CLINIC | Facility: CLINIC | Age: 60
End: 2025-03-11
Payer: MEDICAID

## 2025-03-11 VITALS
RESPIRATION RATE: 18 BRPM | BODY MASS INDEX: 21.86 KG/M2 | HEIGHT: 66 IN | WEIGHT: 136 LBS | HEART RATE: 70 BPM | OXYGEN SATURATION: 96 % | DIASTOLIC BLOOD PRESSURE: 86 MMHG | SYSTOLIC BLOOD PRESSURE: 124 MMHG

## 2025-03-11 DIAGNOSIS — E53.8 VITAMIN B 12 DEFICIENCY: Primary | ICD-10-CM

## 2025-03-11 DIAGNOSIS — R53.83 OTHER FATIGUE: ICD-10-CM

## 2025-03-11 LAB
EXPIRATION DATE: NORMAL
EXPIRATION DATE: NORMAL
FLUAV AG UPPER RESP QL IA.RAPID: NOT DETECTED
FLUBV AG UPPER RESP QL IA.RAPID: NOT DETECTED
INTERNAL CONTROL: NORMAL
INTERNAL CONTROL: NORMAL
Lab: NORMAL
Lab: NORMAL
S PYO AG THROAT QL: NEGATIVE
SARS-COV-2 AG UPPER RESP QL IA.RAPID: NOT DETECTED

## 2025-03-11 RX ORDER — CYANOCOBALAMIN 1000 UG/ML
1000 INJECTION, SOLUTION INTRAMUSCULAR; SUBCUTANEOUS
Status: DISCONTINUED | OUTPATIENT
Start: 2025-03-11 | End: 2025-03-12

## 2025-03-11 RX ADMIN — CYANOCOBALAMIN 1000 MCG: 1000 INJECTION, SOLUTION INTRAMUSCULAR; SUBCUTANEOUS at 16:44

## 2025-03-11 NOTE — PROGRESS NOTES
Subjective   Demi Leija is a 59 y.o. female.     History of Present Illness     The following portions of the patient's history were reviewed and updated as appropriate: allergies, current medications, past family history, past medical history, past social history, past surgical history and problem list.          Acute worsening fatigue x months. Patient has seen endocrinology and rheumatology. She has normal TSH but has Hashimoto's. She has worsening fatigue, was hoping endocrinology could help explain her symptoms. Ended up seeing functional med, lots of labs drawn. Is taking supplements.    Still not feeling well at all, No acute cough, congestion, fever, chills, nightsweats or weight loss.       Review of Systems        Current Outpatient Medications:     Ashwagandha (Ashwagandha 35) 120 MG capsule, Take  by mouth., Disp: , Rfl:     cyclobenzaprine (FLEXERIL) 10 MG tablet, TAKE ONE TABLET BY MOUTH THREE TIMES A DAY AS NEEDED FOR MUSCLE SPASMS, Disp: 60 tablet, Rfl: 0    DULoxetine (CYMBALTA) 30 MG capsule, Take 1 capsule by mouth Daily., Disp: , Rfl:     IODINE, KELP, PO, Take  by mouth., Disp: , Rfl:     lisinopril (PRINIVIL,ZESTRIL) 10 MG tablet, Take 1 tablet by mouth Daily., Disp: , Rfl:     multivitamin with minerals tablet tablet, Take 1 tablet by mouth Daily., Disp: , Rfl:     vitamin D (ERGOCALCIFEROL) 1.25 MG (75579 UT) capsule capsule, Take 1 capsule by mouth., Disp: , Rfl:     vitamin E 100 UNIT capsule, Take 4 capsules by mouth Daily., Disp: 30 capsule, Rfl: 11    Current Facility-Administered Medications:     cyanocobalamin injection 1,000 mcg, 1,000 mcg, Intramuscular, Daily, Raven Lanza APRN, 1,000 mcg at 03/14/25 1359    Objective   Physical Exam  Vitals reviewed.   Constitutional:       Appearance: Normal appearance.   HENT:      Right Ear: Tympanic membrane normal.      Left Ear: Tympanic membrane normal.      Nose: Nose normal.   Cardiovascular:      Rate and Rhythm: Normal rate and  regular rhythm.      Pulses: Normal pulses.      Heart sounds: Normal heart sounds.   Pulmonary:      Effort: Pulmonary effort is normal.   Musculoskeletal:      Cervical back: Normal range of motion.   Skin:     General: Skin is warm.   Neurological:      General: No focal deficit present.      Mental Status: She is alert.         Vitals:    03/11/25 1613   BP: 124/86   Pulse: 70   Resp: 18   SpO2: 96%     Body mass index is 21.95 kg/m².    Procedures    TSH   Date Value Ref Range Status   11/07/2024 2.770 0.270 - 4.200 uIU/mL Final     Hemoglobin A1C   Date Value Ref Range Status   09/17/2024 5.30 4.80 - 5.60 % Final     Comment:     Hemoglobin A1C Ranges:  Increased Risk for Diabetes  5.7% to 6.4%  Diabetes                     >= 6.5%  Diabetic Goal                < 7.0%                     Assessment & Plan   Problems Addressed this Visit    None  Visit Diagnoses         Vitamin B 12 deficiency    -  Primary      Other fatigue        Relevant Orders    POCT SARS-CoV-2 Antigen MARIE + Flu (Completed)    POCT rapid strep A (Completed)          Diagnoses         Codes Comments      Vitamin B 12 deficiency    -  Primary ICD-10-CM: E53.8  ICD-9-CM: 266.2       Other fatigue     ICD-10-CM: R53.83  ICD-9-CM: 780.79                     Orders Placed This Encounter   Procedures    POCT SARS-CoV-2 Antigen MARIE + Flu     Release to patient:   Routine Release [[id number]]    POCT rapid strep A     Release to patient:   Routine Release [[id number]]     Negative flu and covid     Fatigue- reviewed labs, B12 deficiency 280. Functional med not treating this . HGB 15. Will rx B12 injections daily x 1 week, weekly x 1 month, monthly   Will recheck in 12 weeks.         Education provided in AVS   No follow-ups on file.    Patient or patient representative verbalized consent for the use of Ambient Listening during the visit with  CAL Calloway for chart documentation. 3/14/2025  23:08 EDT

## 2025-03-12 ENCOUNTER — CLINICAL SUPPORT (OUTPATIENT)
Dept: FAMILY MEDICINE CLINIC | Facility: CLINIC | Age: 60
End: 2025-03-12
Payer: MEDICAID

## 2025-03-12 DIAGNOSIS — E53.8 VITAMIN B 12 DEFICIENCY: Primary | ICD-10-CM

## 2025-03-12 RX ORDER — CYANOCOBALAMIN 1000 UG/ML
1000 INJECTION, SOLUTION INTRAMUSCULAR; SUBCUTANEOUS DAILY
Status: SHIPPED | OUTPATIENT
Start: 2025-03-12

## 2025-03-12 RX ADMIN — CYANOCOBALAMIN 1000 MCG: 1000 INJECTION, SOLUTION INTRAMUSCULAR; SUBCUTANEOUS at 10:08

## 2025-03-13 ENCOUNTER — CLINICAL SUPPORT (OUTPATIENT)
Dept: FAMILY MEDICINE CLINIC | Facility: CLINIC | Age: 60
End: 2025-03-13
Payer: MEDICAID

## 2025-03-13 PROCEDURE — 96372 THER/PROPH/DIAG INJ SC/IM: CPT | Performed by: NURSE PRACTITIONER

## 2025-03-13 RX ADMIN — CYANOCOBALAMIN 1000 MCG: 1000 INJECTION, SOLUTION INTRAMUSCULAR; SUBCUTANEOUS at 10:20

## 2025-03-14 ENCOUNTER — CLINICAL SUPPORT (OUTPATIENT)
Dept: FAMILY MEDICINE CLINIC | Facility: CLINIC | Age: 60
End: 2025-03-14
Payer: MEDICAID

## 2025-03-14 RX ADMIN — CYANOCOBALAMIN 1000 MCG: 1000 INJECTION, SOLUTION INTRAMUSCULAR; SUBCUTANEOUS at 13:59

## 2025-03-17 ENCOUNTER — CLINICAL SUPPORT (OUTPATIENT)
Dept: FAMILY MEDICINE CLINIC | Facility: CLINIC | Age: 60
End: 2025-03-17
Payer: MEDICAID

## 2025-03-17 PROCEDURE — 96372 THER/PROPH/DIAG INJ SC/IM: CPT | Performed by: NURSE PRACTITIONER

## 2025-03-17 RX ADMIN — CYANOCOBALAMIN 1000 MCG: 1000 INJECTION, SOLUTION INTRAMUSCULAR; SUBCUTANEOUS at 10:14

## 2025-03-24 ENCOUNTER — CLINICAL SUPPORT (OUTPATIENT)
Dept: FAMILY MEDICINE CLINIC | Facility: CLINIC | Age: 60
End: 2025-03-24
Payer: MEDICAID

## 2025-03-24 PROCEDURE — 96372 THER/PROPH/DIAG INJ SC/IM: CPT | Performed by: NURSE PRACTITIONER

## 2025-03-24 RX ADMIN — CYANOCOBALAMIN 1000 MCG: 1000 INJECTION, SOLUTION INTRAMUSCULAR; SUBCUTANEOUS at 08:58

## 2025-03-25 ENCOUNTER — TELEPHONE (OUTPATIENT)
Dept: GASTROENTEROLOGY | Facility: CLINIC | Age: 60
End: 2025-03-25
Payer: MEDICAID

## 2025-03-25 NOTE — TELEPHONE ENCOUNTER
Hub staff attempted to follow warm transfer process and was unsuccessful     Caller: Demi Leija    Relationship to patient: Self    Best call back number: [phone]    Patient is needing: PT HAS SCOPE ON 03/28/25 WITH DR. KAY AND IS NEEDING TO R/S. PLEASE CALL PT BACK

## 2025-03-28 ENCOUNTER — TELEPHONE (OUTPATIENT)
Dept: FAMILY MEDICINE CLINIC | Facility: CLINIC | Age: 60
End: 2025-03-28
Payer: MEDICAID

## 2025-03-28 NOTE — TELEPHONE ENCOUNTER
Patient wanted to let Raven know that her GI testing showed she was positive for h pylori and she is taking four antibiotics.    She will bring paperwork with her on Tuesday when she comes in for her b-12 shot

## 2025-04-08 ENCOUNTER — CLINICAL SUPPORT (OUTPATIENT)
Dept: FAMILY MEDICINE CLINIC | Facility: CLINIC | Age: 60
End: 2025-04-08
Payer: MEDICAID

## 2025-04-08 PROCEDURE — 96372 THER/PROPH/DIAG INJ SC/IM: CPT | Performed by: NURSE PRACTITIONER

## 2025-04-08 RX ADMIN — CYANOCOBALAMIN 1000 MCG: 1000 INJECTION, SOLUTION INTRAMUSCULAR; SUBCUTANEOUS at 11:23

## 2025-04-14 ENCOUNTER — TRANSCRIBE ORDERS (OUTPATIENT)
Dept: ADMINISTRATIVE | Facility: HOSPITAL | Age: 60
End: 2025-04-14
Payer: MEDICAID

## 2025-04-14 DIAGNOSIS — N18.4 HYPERTENSIVE KIDNEY DISEASE WITH CKD STAGE IV: ICD-10-CM

## 2025-04-14 DIAGNOSIS — K52.831 CC (COLLAGENOUS COLITIS): ICD-10-CM

## 2025-04-14 DIAGNOSIS — N18.2 CHRONIC RENAL DISEASE, STAGE II: Primary | ICD-10-CM

## 2025-04-14 DIAGNOSIS — I12.9 HYPERTENSIVE KIDNEY DISEASE WITH CKD STAGE IV: ICD-10-CM

## 2025-04-15 ENCOUNTER — TELEPHONE (OUTPATIENT)
Dept: ORTHOPEDIC SURGERY | Facility: CLINIC | Age: 60
End: 2025-04-15

## 2025-04-15 ENCOUNTER — OFFICE VISIT (OUTPATIENT)
Dept: FAMILY MEDICINE CLINIC | Facility: CLINIC | Age: 60
End: 2025-04-15
Payer: MEDICAID

## 2025-04-15 VITALS
RESPIRATION RATE: 18 BRPM | HEIGHT: 66 IN | OXYGEN SATURATION: 97 % | WEIGHT: 140 LBS | SYSTOLIC BLOOD PRESSURE: 122 MMHG | BODY MASS INDEX: 22.5 KG/M2 | DIASTOLIC BLOOD PRESSURE: 86 MMHG | HEART RATE: 75 BPM

## 2025-04-15 DIAGNOSIS — R35.0 URINARY FREQUENCY: Primary | ICD-10-CM

## 2025-04-15 DIAGNOSIS — N18.30 STAGE 3 CHRONIC KIDNEY DISEASE, UNSPECIFIED WHETHER STAGE 3A OR 3B CKD: ICD-10-CM

## 2025-04-15 DIAGNOSIS — E53.8 VITAMIN B12 DEFICIENCY: ICD-10-CM

## 2025-04-15 LAB
ALBUMIN SERPL-MCNC: 4.4 G/DL (ref 3.5–5.2)
ALBUMIN/GLOB SERPL: 1.9 G/DL
ALP SERPL-CCNC: 60 U/L (ref 39–117)
ALT SERPL-CCNC: 28 U/L (ref 1–33)
AST SERPL-CCNC: 28 U/L (ref 1–32)
BILIRUB BLD-MCNC: NEGATIVE MG/DL
BILIRUB SERPL-MCNC: 0.8 MG/DL (ref 0–1.2)
BUN SERPL-MCNC: 14 MG/DL (ref 6–20)
BUN/CREAT SERPL: 12.6 (ref 7–25)
CALCIUM SERPL-MCNC: 9.4 MG/DL (ref 8.6–10.5)
CHLORIDE SERPL-SCNC: 99 MMOL/L (ref 98–107)
CLARITY, POC: ABNORMAL
CO2 SERPL-SCNC: 26.9 MMOL/L (ref 22–29)
COLOR UR: YELLOW
CREAT SERPL-MCNC: 1.11 MG/DL (ref 0.57–1)
EGFRCR SERPLBLD CKD-EPI 2021: 57.4 ML/MIN/1.73
GLOBULIN SER CALC-MCNC: 2.3 GM/DL
GLUCOSE SERPL-MCNC: 95 MG/DL (ref 65–99)
GLUCOSE UR STRIP-MCNC: NEGATIVE MG/DL
KETONES UR QL: ABNORMAL
LEUKOCYTE EST, POC: ABNORMAL
NITRITE UR-MCNC: NEGATIVE MG/ML
PH UR: 6 [PH] (ref 5–8)
POTASSIUM SERPL-SCNC: 5.1 MMOL/L (ref 3.5–5.2)
PROT SERPL-MCNC: 6.7 G/DL (ref 6–8.5)
PROT UR STRIP-MCNC: ABNORMAL MG/DL
RBC # UR STRIP: ABNORMAL /UL
SODIUM SERPL-SCNC: 138 MMOL/L (ref 136–145)
SP GR UR: 1.02 (ref 1–1.03)
UROBILINOGEN UR QL: NORMAL

## 2025-04-15 RX ORDER — PROGESTERONE 200 MG/1
CAPSULE ORAL
COMMUNITY
Start: 2025-02-26

## 2025-04-15 RX ORDER — SULFAMETHOXAZOLE AND TRIMETHOPRIM 800; 160 MG/1; MG/1
1 TABLET ORAL 2 TIMES DAILY
Qty: 6 TABLET | Refills: 0 | Status: SHIPPED | OUTPATIENT
Start: 2025-04-15 | End: 2025-04-18

## 2025-04-15 RX ADMIN — CYANOCOBALAMIN 1000 MCG: 1000 INJECTION, SOLUTION INTRAMUSCULAR; SUBCUTANEOUS at 10:11

## 2025-04-15 NOTE — TELEPHONE ENCOUNTER
Caller: Demi Leija    Relationship to patient: Self    Best call back number:     Chief complaint: RIGHT ELBOW PAIN    Type of visit: PRP INJECTION     Requested date: ASAP ANY DAY BUT MONDAY'S    Additional notes:PATIENT IS REQUESTING A PRP INJECTION FOR HER RIGHT ELBOW PAIN. PLEASE CONTACT PATIENT TO GET SCHEDULED FOR THIS INJECTION IF POSSIBLE. PATIENT ALSO WANTED TO INFORM DR. PHILLIPS THAT SHE COULD NOT GET THE PLATELETS DONE IN JANUARY AND SHE THINKS ANOTHER INJECTION WOULD BE BEST.        Simponi Counseling:  I discussed with the patient the risks of golimumab including but not limited to myelosuppression, immunosuppression, autoimmune hepatitis, demyelinating diseases, lymphoma, and serious infections.  The patient understands that monitoring is required including a PPD at baseline and must alert us or the primary physician if symptoms of infection or other concerning signs are noted.

## 2025-04-15 NOTE — PROGRESS NOTES
Subjective   Demi Leija is a 59 y.o. female.     History of Present Illness     The following portions of the patient's history were reviewed and updated as appropriate: allergies, current medications, past family history, past medical history, past social history, past surgical history and problem list.       The patient is a 59-year-old female who presents for evaluation of urinary frequency yesterday and pain today.    Urinary frequency was experienced yesterday, followed by pain today. A possible fever was noted this morning, accompanied by a headache at 7:00 AM and elevated blood pressure. Pyridium is being used for pain management, and interest in natural healing methods was expressed. No new sexual partners have been reported recently, but a period of sexual inactivity for 18 days ended last week. Hormone replacement therapy is ongoing, including the use of topical estrogen cream, and an estrogen patch was previously used. H. pylori treatment was completed last Tuesday, and the infection is believed to have been present for 2 years, potentially contributing to osteoporosis and thyroid issues. Currently in the second phase of treatment, licorice tablets, prebiotics, probiotics, and daily kefir consumption are being utilized. Water intake has been reduced due to nausea. An appointment with the gastroenterologist is scheduled for the end of May 2025, following a month-long break from all treatments, during which a scope and colonoscopy will be performed. Care is also being provided by a functional medicine practitioner, Alee, who tested for H. pylori, yielding a result of 5.8. Vitamin D supplements are being taken.    Concerns about osteoporosis are present, and results of a DEXA scan are awaited. Calcium supplements are being taken, and gym activities are planned to resume this week. Consideration of the [employer] program is ongoing, with interest in learning more about it.    Cholesterol levels are  being monitored, which have recently increased despite a healthy diet. Statins are not currently being taken, and cholesterol is being managed through dietary changes and supplements.    B12 injections are being received, with an appointment scheduled for today.    Kidney function is being monitored by a doctor, with an ultrasound scheduled for July 2025.    FAMILY HISTORY  Her brother had a massive stroke and is in a nursing home. Three siblings have had strokes.         Review of Systems        Current Outpatient Medications:     Progesterone (PROMETRIUM) 200 MG capsule, , Disp: , Rfl:     Ashwagandha (Ashwagandha 35) 120 MG capsule, Take  by mouth., Disp: , Rfl:     cyclobenzaprine (FLEXERIL) 10 MG tablet, TAKE ONE TABLET BY MOUTH THREE TIMES A DAY AS NEEDED FOR MUSCLE SPASMS, Disp: 60 tablet, Rfl: 0    DULoxetine (CYMBALTA) 30 MG capsule, Take 1 capsule by mouth Daily., Disp: , Rfl:     IODINE, KELP, PO, Take  by mouth., Disp: , Rfl:     lisinopril (PRINIVIL,ZESTRIL) 10 MG tablet, Take 1 tablet by mouth Daily., Disp: , Rfl:     multivitamin with minerals tablet tablet, Take 1 tablet by mouth Daily., Disp: , Rfl:     sulfamethoxazole-trimethoprim (Bactrim DS) 800-160 MG per tablet, Take 1 tablet by mouth 2 (Two) Times a Day for 3 days., Disp: 6 tablet, Rfl: 0    vitamin D (ERGOCALCIFEROL) 1.25 MG (78397 UT) capsule capsule, Take 1 capsule by mouth., Disp: , Rfl:     vitamin E 100 UNIT capsule, Take 4 capsules by mouth Daily., Disp: 30 capsule, Rfl: 11    Current Facility-Administered Medications:     cyanocobalamin injection 1,000 mcg, 1,000 mcg, Intramuscular, Daily, Raven Lanza APRN, 1,000 mcg at 04/15/25 1011    Objective   Physical Exam  Constitutional:       Appearance: Normal appearance.   Cardiovascular:      Rate and Rhythm: Normal rate and regular rhythm.      Pulses: Normal pulses.      Heart sounds: Normal heart sounds.   Pulmonary:      Effort: Pulmonary effort is normal.   Abdominal:       General: Bowel sounds are normal.      Palpations: Abdomen is soft.      Tenderness: There is no abdominal tenderness.   Musculoskeletal:         General: Normal range of motion.   Skin:     General: Skin is warm.   Neurological:      General: No focal deficit present.      Mental Status: She is alert.         Vitals:    04/15/25 0955   BP: 122/86   Pulse: 75   Resp: 18   SpO2: 97%     Body mass index is 22.6 kg/m².    Procedures    TSH   Date Value Ref Range Status   11/07/2024 2.770 0.270 - 4.200 uIU/mL Final     Hemoglobin A1C   Date Value Ref Range Status   09/17/2024 5.30 4.80 - 5.60 % Final     Comment:     Hemoglobin A1C Ranges:  Increased Risk for Diabetes  5.7% to 6.4%  Diabetes                     >= 6.5%  Diabetic Goal                < 7.0%              Over the past 2 weeks, how often have you been bothered by any of the following problems?  Little interest or pleasure in doing things: Not at all  Feeling down, depressed, or hopeless: Not at all      Assessment & Plan   Problems Addressed this Visit    None  Visit Diagnoses         Urinary frequency    -  Primary    Relevant Orders    POC Urinalysis Dipstick (Completed)    Urine Culture - Urine, Urine, Clean Catch    Comprehensive metabolic panel      Stage 3 chronic kidney disease, unspecified whether stage 3a or 3b CKD        Relevant Orders    Comprehensive metabolic panel      Vitamin B12 deficiency              Diagnoses         Codes Comments      Urinary frequency    -  Primary ICD-10-CM: R35.0  ICD-9-CM: 788.41       Stage 3 chronic kidney disease, unspecified whether stage 3a or 3b CKD     ICD-10-CM: N18.30  ICD-9-CM: 585.3       Vitamin B12 deficiency     ICD-10-CM: E53.8  ICD-9-CM: 266.2                1. Urinary Tract Infection (UTI).  - Reported urinary frequency yesterday and pain today.  - Urine test showed cloudy urine, blood, protein, large leukocytes, and negative nitrite.  - A 3-day course of Bactrim has been prescribed. Advised to  take cranberry supplements and increase water intake.  - A urine culture will be performed to guide potential changes in treatment. If the culture indicates a different pathogen or resistance, the antibiotic regimen will be adjusted accordingly.    2. Osteoporosis.  - Expressed concern about osteoporosis and is currently taking a calcium supplement.  - Advised to engage in weight-bearing exercises such as lifting heavy weights, using ankle and arm weights, and performing jumping routines. The use of a weighted vest for walking was also recommended.  - Encouraged to explore Pilates or yoga specifically designed for osteoporosis if affordable.  - Referral to physical therapy for osteoporosis-centered exercises was suggested.    3. Elevated cholesterol.  - Reported that cholesterol levels have increased recently despite maintaining a healthy diet.  - Not currently on a statin and is managing cholesterol through diet changes and supplements.  - Advised to ensure that kidney function is monitored while taking supplements to avoid any adverse effects.    4. Vitamin B12 deficiency.  - Currently receiving B12 injections and has an appointment for an injection today.  - Goal is to maintain B12 levels above 500 to improve overall well-being.              Education provided in AVS   No follow-ups on file.    Patient or patient representative verbalized consent for the use of Ambient Listening during the visit with  CAL Calloway for chart documentation. 4/17/2025  23:50 EDT

## 2025-04-16 NOTE — PROGRESS NOTES
Patient: Demi Leija  YOB: 1965  Date of Service: 4/16/2025    Chief Complaints: Right elbow pain    Subjective:    History of Present Illness: Pt is seen in the office today with complaints of right elbow pain I last saw her August of last year we thought she had medial epicondylitis she states she was doing great and recently started bother her but it is very different now than it was before she has marked, distinct palpable tenderness over the medial epicondyle she has significant amount of numbness and tingling when she hits that bone        Allergies:   Allergies   Allergen Reactions    Nylon Unknown - Low Severity     Nylon stitches   will not dissolve causes irritation of skin        Medications:   Home Medications:  Current Outpatient Medications on File Prior to Visit   Medication Sig    Ashwagandha (Ashwagandha 35) 120 MG capsule Take  by mouth.    cyclobenzaprine (FLEXERIL) 10 MG tablet TAKE ONE TABLET BY MOUTH THREE TIMES A DAY AS NEEDED FOR MUSCLE SPASMS    DULoxetine (CYMBALTA) 30 MG capsule Take 1 capsule by mouth Daily.    IODINE, KELP, PO Take  by mouth.    lisinopril (PRINIVIL,ZESTRIL) 10 MG tablet Take 1 tablet by mouth Daily.    multivitamin with minerals tablet tablet Take 1 tablet by mouth Daily.    Progesterone (PROMETRIUM) 200 MG capsule     sulfamethoxazole-trimethoprim (Bactrim DS) 800-160 MG per tablet Take 1 tablet by mouth 2 (Two) Times a Day for 3 days.    vitamin D (ERGOCALCIFEROL) 1.25 MG (88465 UT) capsule capsule Take 1 capsule by mouth.    vitamin E 100 UNIT capsule Take 4 capsules by mouth Daily.     Current Facility-Administered Medications on File Prior to Visit   Medication    cyanocobalamin injection 1,000 mcg     Current Medications:  Scheduled Meds:cyanocobalamin, 1,000 mcg, Intramuscular, Daily      Continuous Infusions:   PRN Meds:.    I have reviewed the patient's medical history in detail and updated the computerized patient record.  Review and  summarization of old records include:    Past Medical History:   Diagnosis Date    Acquired absence of other specified parts of digestive tract     Asthma     Chronic serous otitis media     Colitis     Crohn disease     Diarrhea, unspecified     Gastroesophageal reflux disease 10/11/2023    Hashimoto's disease     Headache     Hypertension     Hypothyroidism     Lumbar herniated disc     Pain in joints of unspecified hand     Post-menopause     Sciatica associated with disorder of lumbar spine     Tattoos         Past Surgical History:   Procedure Laterality Date    ABDOMINOPLASTY  2012    mini    BUNIONECTOMY Bilateral 2010    CHOLECYSTECTOMY  2021    COLONOSCOPY  10/2021    FOOT FRACTURE SURGERY Left     great toe    HEMORRHOIDECTOMY  2010    NOSE SURGERY      RHINOPLASTY  1995    STOMACH SURGERY      TOE SURGERY      TONSILLECTOMY      WISDOM TOOTH EXTRACTION          Social History     Occupational History    Occupation: REALTOR   Tobacco Use    Smoking status: Never     Passive exposure: Never    Smokeless tobacco: Never   Vaping Use    Vaping status: Never Used   Substance and Sexual Activity    Alcohol use: Yes     Alcohol/week: 6.0 standard drinks of alcohol     Types: 4 Glasses of wine, 2 Drinks containing 0.5 oz of alcohol per week    Drug use: Yes     Types: Marijuana     Comment: 4-5 times a year    Sexual activity: Yes     Partners: Male     Birth control/protection: None, Post-menopausal     Comment: Partner had a vasectomy      Social History     Social History Narrative    Not on file        Family History   Problem Relation Age of Onset    Diabetes Mother     Arthritis Mother     Stroke Mother     Arthritis Father     Cancer Father         colon    Stroke Father     Colon cancer Father     Diabetes Father     Hypertension Sister     Cancer Brother         prostate    Stroke Brother         x 2    Hypertension Brother     Colon polyps Brother     SARA disease Brother     Irritable bowel syndrome  Brother         Brothers    Prostate cancer Brother     Hypertension Brother     Stroke Brother     Prostate cancer Brother     Stroke Brother     Hypertension Brother     Stroke Maternal Grandmother     Diabetes Maternal Grandmother     Colon cancer Maternal Grandmother     Stroke Paternal Grandmother     Hypertension Other        ROS: 14 point review of systems was performed and was negative except for documented findings in HPI and today's encounter.     Allergies:   Allergies   Allergen Reactions    Nylon Unknown - Low Severity     Nylon stitches   will not dissolve causes irritation of skin      Constitutional:  Denies fever, shaking or chills   Eyes:  Denies change in visual acuity   HENT:  Denies nasal congestion or sore throat   Respiratory:  Denies cough or shortness of breath   Cardiovascular:  Denies chest pain or severe LE edema   GI:  Denies abdominal pain, nausea, vomiting, bloody stools or diarrhea   Musculoskeletal:  Numbness, tingling, or loss of motor function only as noted above in history of present illness.  : Denies painful urination or hematuria  Integument:  Denies rash, lesion or ulceration   Neurologic:  Denies headache or focal weakness  Endocrine:  Denies lymphadenopathy  Psych:  Denies confusion or change in mental status   Hem:  Denies active bleeding      Physical Exam: 59 y.o. female  Wt Readings from Last 3 Encounters:   04/15/25 63.5 kg (140 lb)   03/11/25 61.7 kg (136 lb)   02/05/25 60.4 kg (133 lb 3.2 oz)       There is no height or weight on file to calculate BMI.    There were no vitals filed for this visit.  Vital signs reviewed.   General Appearance:    Alert, cooperative, in no acute distress                    Ortho exam    Exam of her right elbow she has full range of motion she has a palpable nerve that sitting right on her epicondyle she has a very positive Tinel's there she is otherwise neurologically intact      X-rays AP and lateral of the right elbow were taken to  evaluate her symptoms and compared to previous films these are normal      Assessment: Right elbow pain I really think this is more of a cubital tunnel but probably even more than that a subluxing ulnar nerve we talked about transfers at this point I would like her to keep all of her weight off that medial elbow do not put any direct pressure on that that I would like her to see Dr. Cheung for possible submuscular transfer    Plan: As above  Follow up as indicated.  Ice, elevate, and rest as needed.  Discussed conservative measures of pain control including ice, bracing.    Farzaneh Lira M.D.

## 2025-04-17 ENCOUNTER — TELEPHONE (OUTPATIENT)
Dept: FAMILY MEDICINE CLINIC | Facility: CLINIC | Age: 60
End: 2025-04-17

## 2025-04-17 RX ORDER — FLUCONAZOLE 150 MG/1
150 TABLET ORAL ONCE
Qty: 1 TABLET | Refills: 1 | Status: SHIPPED | OUTPATIENT
Start: 2025-04-17 | End: 2025-04-17

## 2025-04-18 ENCOUNTER — TELEPHONE (OUTPATIENT)
Dept: FAMILY MEDICINE CLINIC | Facility: CLINIC | Age: 60
End: 2025-04-18
Payer: MEDICAID

## 2025-04-18 LAB
BACTERIA UR CULT: ABNORMAL
BACTERIA UR CULT: ABNORMAL
OTHER ANTIBIOTIC SUSC ISLT: ABNORMAL

## 2025-04-18 RX ORDER — CEPHALEXIN 500 MG/1
500 CAPSULE ORAL 2 TIMES DAILY
Qty: 6 CAPSULE | Refills: 0 | Status: SHIPPED | OUTPATIENT
Start: 2025-04-18 | End: 2025-04-21

## 2025-04-18 NOTE — TELEPHONE ENCOUNTER
Spoke with patient, informed her that testing urine soon after finishing antibiotic could lead to false negative results for a urinalysis. She verbalized an understanding. Instructed to wait at least 10 days after finishing antibiotic to retest urine if symptoms persist.

## 2025-04-18 NOTE — TELEPHONE ENCOUNTER
Caller: Demi Leija    Relationship to patient: Self    Best call back number:      Patient is needing: PATIENT STATES SHE HAS FINISHED HER LAST ANTIBIOTIC FOR HER UTI.   SHE STATES SHE THINKS SHE STILL IS HAVING SYMPTOMS, AND WANTS TO KNOW IF SHE SHOULD COME IN AND GET HER URINE CHECKED, OR WHAT SHOULD SHE DO.  PLEASE CALL TO ADVISE.

## 2025-04-22 ENCOUNTER — OFFICE VISIT (OUTPATIENT)
Dept: ORTHOPEDIC SURGERY | Facility: CLINIC | Age: 60
End: 2025-04-22
Payer: MEDICAID

## 2025-04-22 VITALS — WEIGHT: 141 LBS | TEMPERATURE: 98.7 F | BODY MASS INDEX: 22.66 KG/M2 | HEIGHT: 66 IN

## 2025-04-22 DIAGNOSIS — R52 PAIN: Primary | ICD-10-CM

## 2025-04-22 DIAGNOSIS — G56.21 CUBITAL TUNNEL SYNDROME ON RIGHT: ICD-10-CM

## 2025-04-22 PROCEDURE — 99213 OFFICE O/P EST LOW 20 MIN: CPT | Performed by: ORTHOPAEDIC SURGERY

## 2025-04-23 ENCOUNTER — CLINICAL SUPPORT (OUTPATIENT)
Dept: FAMILY MEDICINE CLINIC | Facility: CLINIC | Age: 60
End: 2025-04-23
Payer: MEDICAID

## 2025-04-23 RX ADMIN — CYANOCOBALAMIN 1000 MCG: 1000 INJECTION, SOLUTION INTRAMUSCULAR; SUBCUTANEOUS at 10:10

## 2025-05-06 ENCOUNTER — CLINICAL SUPPORT (OUTPATIENT)
Dept: FAMILY MEDICINE CLINIC | Facility: CLINIC | Age: 60
End: 2025-05-06
Payer: MEDICAID

## 2025-05-06 ENCOUNTER — LAB (OUTPATIENT)
Dept: FAMILY MEDICINE CLINIC | Facility: CLINIC | Age: 60
End: 2025-05-06
Payer: MEDICAID

## 2025-05-06 ENCOUNTER — TELEPHONE (OUTPATIENT)
Dept: FAMILY MEDICINE CLINIC | Facility: CLINIC | Age: 60
End: 2025-05-06
Payer: MEDICAID

## 2025-05-06 DIAGNOSIS — R39.9 UTI SYMPTOMS: Primary | ICD-10-CM

## 2025-05-06 DIAGNOSIS — R35.0 URINARY FREQUENCY: Primary | ICD-10-CM

## 2025-05-06 LAB
BILIRUB BLD-MCNC: NEGATIVE MG/DL
CLARITY, POC: ABNORMAL
COLOR UR: YELLOW
GLUCOSE UR STRIP-MCNC: NEGATIVE MG/DL
KETONES UR QL: NEGATIVE
LEUKOCYTE EST, POC: ABNORMAL
NITRITE UR-MCNC: NEGATIVE MG/ML
PH UR: 6 [PH] (ref 5–8)
PROT UR STRIP-MCNC: ABNORMAL MG/DL
RBC # UR STRIP: ABNORMAL /UL
SP GR UR: 1 (ref 1–1.03)
UROBILINOGEN UR QL: NORMAL

## 2025-05-06 RX ADMIN — CYANOCOBALAMIN 1000 MCG: 1000 INJECTION, SOLUTION INTRAMUSCULAR; SUBCUTANEOUS at 11:06

## 2025-05-07 RX ORDER — CIPROFLOXACIN 500 MG/1
500 TABLET, FILM COATED ORAL 2 TIMES DAILY
Qty: 10 TABLET | Refills: 0 | Status: SHIPPED | OUTPATIENT
Start: 2025-05-07 | End: 2025-05-12

## 2025-05-09 ENCOUNTER — TELEPHONE (OUTPATIENT)
Dept: GASTROENTEROLOGY | Facility: CLINIC | Age: 60
End: 2025-05-09

## 2025-05-09 NOTE — TELEPHONE ENCOUNTER
Hub staff attempted to follow warm transfer process and was unsuccessful     Caller: Demi Leija    Relationship to patient: Self    Best call back number: [phone]    Patient is needing: PT IS RETURNING MISSED CALL TO RESCHEDULE CANCELED SCOPE. PLEASE CONTACT PT TO ASSIST. BEST TIME TO REACH PT IS ANYTIME.

## 2025-05-10 LAB
BACTERIA UR CULT: ABNORMAL
BACTERIA UR CULT: ABNORMAL
OTHER ANTIBIOTIC SUSC ISLT: ABNORMAL

## 2025-05-12 ENCOUNTER — TELEPHONE (OUTPATIENT)
Dept: GASTROENTEROLOGY | Facility: CLINIC | Age: 60
End: 2025-05-12
Payer: MEDICAID

## 2025-05-13 ENCOUNTER — HOSPITAL ENCOUNTER (OUTPATIENT)
Dept: ULTRASOUND IMAGING | Facility: HOSPITAL | Age: 60
Discharge: HOME | End: 2025-05-13
Admitting: INTERNAL MEDICINE
Payer: MEDICAID

## 2025-05-13 DIAGNOSIS — K52.831 CC (COLLAGENOUS COLITIS): ICD-10-CM

## 2025-05-13 DIAGNOSIS — I12.9 HYPERTENSIVE KIDNEY DISEASE WITH CKD STAGE IV: ICD-10-CM

## 2025-05-13 DIAGNOSIS — N18.4 HYPERTENSIVE KIDNEY DISEASE WITH CKD STAGE IV: ICD-10-CM

## 2025-05-13 DIAGNOSIS — N18.2 CHRONIC RENAL DISEASE, STAGE II: ICD-10-CM

## 2025-05-13 PROCEDURE — 76775 US EXAM ABDO BACK WALL LIM: CPT

## 2025-05-14 ENCOUNTER — CLINICAL SUPPORT (OUTPATIENT)
Dept: FAMILY MEDICINE CLINIC | Facility: CLINIC | Age: 60
End: 2025-05-14
Payer: MEDICAID

## 2025-05-14 DIAGNOSIS — E53.8 VITAMIN B12 DEFICIENCY: Primary | ICD-10-CM

## 2025-05-14 RX ORDER — CYANOCOBALAMIN 1000 UG/ML
1000 INJECTION, SOLUTION INTRAMUSCULAR; SUBCUTANEOUS
Status: SHIPPED | OUTPATIENT
Start: 2025-05-14

## 2025-05-14 RX ADMIN — CYANOCOBALAMIN 1000 MCG: 1000 INJECTION, SOLUTION INTRAMUSCULAR; SUBCUTANEOUS at 11:12

## 2025-05-20 ENCOUNTER — TELEPHONE (OUTPATIENT)
Dept: FAMILY MEDICINE CLINIC | Facility: CLINIC | Age: 60
End: 2025-05-20

## 2025-05-20 ENCOUNTER — CLINICAL SUPPORT (OUTPATIENT)
Dept: FAMILY MEDICINE CLINIC | Facility: CLINIC | Age: 60
End: 2025-05-20
Payer: MEDICAID

## 2025-05-20 DIAGNOSIS — Z23 NEED FOR VACCINATION: Primary | ICD-10-CM

## 2025-05-20 RX ADMIN — CYANOCOBALAMIN 1000 MCG: 1000 INJECTION, SOLUTION INTRAMUSCULAR; SUBCUTANEOUS at 09:28

## 2025-05-20 NOTE — TELEPHONE ENCOUNTER
During nurse visit for b12 shot today, patient mentioned that her BP has been high lately, with the highest being high 140s/103 this past weekend. Patient stated that a headache woke her up that morning and she took a lipitor to try and lower BP. Patient mentioned this to another physician who is managing her HRT and they recommended that patient reach out to you for instruction. Patient also stated that she is losing track of b12 dosing schedule and is unsure of when to start scheduling monthly injections.

## 2025-05-22 RX ORDER — LISINOPRIL 20 MG/1
20 TABLET ORAL DAILY
Qty: 30 TABLET | Refills: 0 | Status: SHIPPED | OUTPATIENT
Start: 2025-05-22

## 2025-05-22 NOTE — TELEPHONE ENCOUNTER
Caller: Demi Leija    Relationship: Self    Best call back number: [phone]     Requested Prescriptions:   Requested Prescriptions     Pending Prescriptions Disp Refills    lisinopril (PRINIVIL,ZESTRIL) 20 MG tablet       Sig: Take 1 tablet by mouth Daily.        Pharmacy where request should be sent: [facility] [id number] [facility] [address] - [phone]  - [phone] FX     Last office visit with prescribing clinician: 4/15/2025   Last telemedicine visit with prescribing clinician: Visit date not found   Next office visit with prescribing clinician: Visit date not found     Additional details provided by patient: patient doesn't have this medication can you please call into pharmacy listed above?    Does the patient have less than a 3 day supply:  [x] Yes  [] No    Would you like a call back once the refill request has been completed: [] Yes [] No    If the office needs to give you a call back, can they leave a voicemail: [] Yes [] No    Fermin Mcdonald   05/22/25 14:02 EDT

## 2025-05-27 ENCOUNTER — CLINICAL SUPPORT (OUTPATIENT)
Dept: FAMILY MEDICINE CLINIC | Facility: CLINIC | Age: 60
End: 2025-05-27
Payer: MEDICAID

## 2025-05-27 PROCEDURE — 96372 THER/PROPH/DIAG INJ SC/IM: CPT | Performed by: NURSE PRACTITIONER

## 2025-05-27 RX ADMIN — CYANOCOBALAMIN 1000 MCG: 1000 INJECTION, SOLUTION INTRAMUSCULAR; SUBCUTANEOUS at 09:21

## 2025-06-03 ENCOUNTER — OFFICE VISIT (OUTPATIENT)
Dept: FAMILY MEDICINE CLINIC | Facility: CLINIC | Age: 60
End: 2025-06-03
Payer: MEDICAID

## 2025-06-03 ENCOUNTER — TELEPHONE (OUTPATIENT)
Dept: FAMILY MEDICINE CLINIC | Facility: CLINIC | Age: 60
End: 2025-06-03

## 2025-06-03 VITALS
HEIGHT: 66 IN | SYSTOLIC BLOOD PRESSURE: 126 MMHG | OXYGEN SATURATION: 98 % | DIASTOLIC BLOOD PRESSURE: 90 MMHG | BODY MASS INDEX: 22.76 KG/M2 | RESPIRATION RATE: 18 BRPM | HEART RATE: 78 BPM

## 2025-06-03 DIAGNOSIS — R51.9 NONINTRACTABLE HEADACHE, UNSPECIFIED CHRONICITY PATTERN, UNSPECIFIED HEADACHE TYPE: ICD-10-CM

## 2025-06-03 DIAGNOSIS — I10 PRIMARY HYPERTENSION: ICD-10-CM

## 2025-06-03 DIAGNOSIS — J40 BRONCHITIS: Primary | ICD-10-CM

## 2025-06-03 DIAGNOSIS — J45.909 ASTHMA, UNSPECIFIED ASTHMA SEVERITY, UNSPECIFIED WHETHER COMPLICATED, UNSPECIFIED WHETHER PERSISTENT: Primary | ICD-10-CM

## 2025-06-03 DIAGNOSIS — Z87.09 HISTORY OF ASTHMA: ICD-10-CM

## 2025-06-03 DIAGNOSIS — R06.02 SHORTNESS OF BREATH: ICD-10-CM

## 2025-06-03 DIAGNOSIS — J04.0 LARYNGITIS: ICD-10-CM

## 2025-06-03 PROCEDURE — 1126F AMNT PAIN NOTED NONE PRSNT: CPT | Performed by: NURSE PRACTITIONER

## 2025-06-03 PROCEDURE — 87428 SARSCOV & INF VIR A&B AG IA: CPT | Performed by: NURSE PRACTITIONER

## 2025-06-03 PROCEDURE — 99214 OFFICE O/P EST MOD 30 MIN: CPT | Performed by: NURSE PRACTITIONER

## 2025-06-03 RX ORDER — PREDNISONE 20 MG/1
20 TABLET ORAL DAILY
Qty: 5 TABLET | Refills: 0 | Status: SHIPPED | OUTPATIENT
Start: 2025-06-03 | End: 2025-06-08

## 2025-06-03 RX ORDER — BENZONATATE 100 MG/1
100 CAPSULE ORAL 3 TIMES DAILY PRN
COMMUNITY

## 2025-06-03 RX ORDER — MONTELUKAST SODIUM 10 MG/1
10 TABLET ORAL NIGHTLY
Qty: 30 TABLET | Refills: 3 | Status: SHIPPED | OUTPATIENT
Start: 2025-06-03

## 2025-06-03 RX ORDER — PROMETHAZINE HYDROCHLORIDE AND CODEINE PHOSPHATE 6.25; 1 MG/5ML; MG/5ML
5 SOLUTION ORAL EVERY 6 HOURS PRN
Qty: 180 ML | Refills: 0 | Status: SHIPPED | OUTPATIENT
Start: 2025-06-03

## 2025-06-03 RX ORDER — ALBUTEROL SULFATE 90 UG/1
INHALANT RESPIRATORY (INHALATION) EVERY 4 HOURS PRN
COMMUNITY

## 2025-06-03 RX ORDER — DEXTROMETHORPHAN HYDROBROMIDE AND PROMETHAZINE HYDROCHLORIDE 15; 6.25 MG/5ML; MG/5ML
5 SYRUP ORAL 4 TIMES DAILY PRN
Qty: 180 ML | Refills: 0 | Status: SHIPPED | OUTPATIENT
Start: 2025-06-03

## 2025-06-03 RX ORDER — GUAIFENESIN 200 MG/10ML
200 LIQUID ORAL 3 TIMES DAILY PRN
COMMUNITY

## 2025-06-03 RX ORDER — IPRATROPIUM BROMIDE AND ALBUTEROL SULFATE 2.5; .5 MG/3ML; MG/3ML
3 SOLUTION RESPIRATORY (INHALATION) EVERY 4 HOURS PRN
Qty: 360 ML | Refills: 1 | Status: SHIPPED | OUTPATIENT
Start: 2025-06-03

## 2025-06-03 NOTE — TELEPHONE ENCOUNTER
Caller: [facility] [phone] - [facility] [address] AT [facility] [address] - [phone]  - [phone] FX    Relationship to patient: Pharmacy    Best call back number: [phone]     Patient is needing:   KAELA FROM [facility] IS CALLING IN REGARDING THE promethazine-codeine (PHENERGAN with CODEINE) 6.25-10 MG/5ML solution     STATES THE PHARMACY CAN NO LONGER FILL OR ORDER THIS MEDICATION.     PLEASE ADVISE.

## 2025-06-03 NOTE — PROGRESS NOTES
Subjective   Demi Leija is a 59 y.o. female.     History of Present Illness     The following portions of the patient's history were reviewed and updated as appropriate: allergies, current medications, past family history, past medical history, past social history, past surgical history and problem list.       The patient is a 59-year-old female who presents for evaluation of a cough that has persisted for 1 week. She is accompanied by her fiancé.    She reports experiencing laryngitis symptoms, including loss of voice and persistent coughing throughout the night. She also reports shortness of breath. She has not experienced any fever. She has been using a humidifier and consuming hot tea with lemon, honey, and ginger twice daily. She has not taken Tylenol. She does not have a nebulizer at home but was previously prescribed one during a severe bronchitis episode. She has completed her treatment for H. pylori and is awaiting retesting. She has a history of bronchitis and allergy-induced asthma. She has been diligent in taking her over-the-counter Allegra nightly for the past month to prevent these conditions. She does not use nasal spray and has previously used Singulair or montelukast for allergies. She has received a steroid injection in her elbow but not in her buttocks. She has attempted to manage her symptoms with an albuterol inhaler, over-the-counter Robitussin, and benzonatate, but these interventions have not provided relief.    She has a history of low blood pressure, which has recently increased. She is currently on blood pressure medication.    FAMILY HISTORY  She has a family history of hypertension and stroke, with all siblings having had a stroke and her mother having multiple strokes. Her father had diabetes in his late 70s.         Review of Systems        Current Outpatient Medications:     albuterol sulfate  (90 Base) MCG/ACT inhaler, Inhale Every 4 (Four) Hours As Needed for  Wheezing., Disp: , Rfl:     Ashwagandha (Ashwagandha 35) 120 MG capsule, Take  by mouth., Disp: , Rfl:     benzonatate (TESSALON) 100 MG capsule, Take 1 capsule by mouth 3 (Three) Times a Day As Needed for Cough., Disp: , Rfl:     Berberine Chloride (BERBERINE HCI PO), Take  by mouth., Disp: , Rfl:     guaifenesin (ROBITUSSIN) 100 MG/5ML liquid, Take 10 mL by mouth 3 (Three) Times a Day As Needed for Cough., Disp: , Rfl:     LEVOTHYROXINE SODIUM PO, Take  by mouth., Disp: , Rfl:     lisinopril (PRINIVIL,ZESTRIL) 20 MG tablet, Take 1 tablet by mouth Daily., Disp: 30 tablet, Rfl: 0    multivitamin with minerals tablet tablet, Take 1 tablet by mouth Daily., Disp: , Rfl:     Progesterone (PROMETRIUM) 200 MG capsule, , Disp: , Rfl:     Unable to find, 1 each 1 (One) Time. [employer] [facility], Disp: , Rfl:     vitamin D (ERGOCALCIFEROL) 1.25 MG (85316 UT) capsule capsule, Take 1 capsule by mouth., Disp: , Rfl:     vitamin E 100 UNIT capsule, Take 4 capsules by mouth Daily., Disp: 30 capsule, Rfl: 11    cyclobenzaprine (FLEXERIL) 10 MG tablet, TAKE ONE TABLET BY MOUTH THREE TIMES A DAY AS NEEDED FOR MUSCLE SPASMS (Patient not taking: Reported on 6/3/2025), Disp: 60 tablet, Rfl: 0    DULoxetine (CYMBALTA) 30 MG capsule, Take 1 capsule by mouth Daily. (Patient not taking: Reported on 6/3/2025), Disp: , Rfl:     IODINE, KELP, PO, Take  by mouth. (Patient not taking: Reported on 6/3/2025), Disp: , Rfl:     Objective   Physical Exam  Vitals reviewed.   Constitutional:       Appearance: Normal appearance.   HENT:      Nose: Congestion present.      Mouth/Throat:      Mouth: Mucous membranes are moist.   Cardiovascular:      Rate and Rhythm: Normal rate and regular rhythm.      Pulses: Normal pulses.      Heart sounds: Normal heart sounds.   Pulmonary:      Breath sounds: Wheezing present.   Skin:     General: Skin is warm.   Neurological:      General: No focal deficit present.      Mental Status: She is alert.         Vitals:     06/03/25 1238   BP: 126/90   Pulse: 78   Resp: 18   SpO2: 98%     Body mass index is 22.76 kg/m².    Procedures    TSH   Date Value Ref Range Status   11/07/2024 2.770 0.270 - 4.200 uIU/mL Final     Hemoglobin A1C   Date Value Ref Range Status   09/17/2024 5.30 4.80 - 5.60 % Final     Comment:     Hemoglobin A1C Ranges:  Increased Risk for Diabetes  5.7% to 6.4%  Diabetes                     >= 6.5%  Diabetic Goal                < 7.0%                     Assessment & Plan   Problems Addressed this Visit       Headache    Relevant Orders    POCT SARS-CoV-2 + Flu Antigen MARIE (Completed)     Other Visit Diagnoses         Bronchitis    -  Primary    Relevant Medications    albuterol sulfate  (90 Base) MCG/ACT inhaler    guaifenesin (ROBITUSSIN) 100 MG/5ML liquid    benzonatate (TESSALON) 100 MG capsule    Other Relevant Orders    POCT SARS-CoV-2 + Flu Antigen MARIE (Completed)      Laryngitis        Relevant Orders    POCT SARS-CoV-2 + Flu Antigen MARIE (Completed)      Shortness of breath        Relevant Orders    POCT SARS-CoV-2 + Flu Antigen MARIE (Completed)      History of asthma              Diagnoses         Codes Comments      Bronchitis    -  Primary ICD-10-CM: J40  ICD-9-CM: 490       Nonintractable headache, unspecified chronicity pattern, unspecified headache type     ICD-10-CM: R51.9  ICD-9-CM: 784.0       Laryngitis     ICD-10-CM: J04.0  ICD-9-CM: 464.00       Shortness of breath     ICD-10-CM: R06.02  ICD-9-CM: 786.05       History of asthma     ICD-10-CM: Z87.09  ICD-9-CM: V12.69                1. Cough.  - The etiology of the cough is likely viral, potentially leading to laryngitis and an exacerbation of asthma.  - Oxygen saturation levels are within normal limits at 98%.  - Montelukast or Singulair once daily is recommended for improved control of asthma and allergies. A prescription for codeine cough syrup has been provided, to be taken up to four times daily. A nebulizer will be ordered along  with the necessary medication, to be used four times daily.  - Maintain hydration, use a humidifier, and consume hot tea with lemon, honey, and dennis. Avoid outdoor activities, ensure adequate rest, and limit contact with large groups of people for the next 1 to 2 weeks. Use an albuterol inhaler with 2 puffs every 4 hours when outside, and 4 times daily when at home. Elevate the head with 2 pillows or sleep on the sofa for comfort. Seek immediate medical attention at the ER if there is no improvement or if the condition worsens.    2. Hypertension.  - Blood pressure should be monitored at home.  - If blood pressure readings consistently exceed 140/90, double the dose of lisinopril for the duration of steroid treatment.  - Referral to cardiology has been made for further evaluation.              Education provided in AVS   No follow-ups on file.    Patient or patient representative verbalized consent for the use of Ambient Listening during the visit with  CAL Calloway for chart documentation. 6/7/2025  14:34 EDT

## 2025-06-03 NOTE — TELEPHONE ENCOUNTER
"Patient calling about order for nebulizer.  She went to [facility] and was told we needed to send the script to them thru \"[facility]\".  Please call patient   "

## 2025-06-09 DIAGNOSIS — H72.90: ICD-10-CM

## 2025-06-09 DIAGNOSIS — J45.909 ASTHMA, UNSPECIFIED ASTHMA SEVERITY, UNSPECIFIED WHETHER COMPLICATED, UNSPECIFIED WHETHER PERSISTENT: Primary | ICD-10-CM

## 2025-06-24 ENCOUNTER — TELEPHONE (OUTPATIENT)
Dept: FAMILY MEDICINE CLINIC | Facility: CLINIC | Age: 60
End: 2025-06-24
Payer: MEDICAID

## 2025-06-24 NOTE — TELEPHONE ENCOUNTER
Patient called to reschedule her B12 shot appointment and was asking if Raven wanted to get a urine sample.  She had a recent UTI.  No orders in chart at this time.

## 2025-06-25 ENCOUNTER — CLINICAL SUPPORT (OUTPATIENT)
Dept: FAMILY MEDICINE CLINIC | Facility: CLINIC | Age: 60
End: 2025-06-25
Payer: MEDICAID

## 2025-06-25 DIAGNOSIS — E53.8 VITAMIN B12 DEFICIENCY: Primary | ICD-10-CM

## 2025-06-25 PROCEDURE — 96372 THER/PROPH/DIAG INJ SC/IM: CPT | Performed by: NURSE PRACTITIONER

## 2025-06-25 RX ORDER — CYANOCOBALAMIN 1000 UG/ML
1000 INJECTION, SOLUTION INTRAMUSCULAR; SUBCUTANEOUS
Status: SHIPPED | OUTPATIENT
Start: 2025-06-25

## 2025-06-25 RX ADMIN — CYANOCOBALAMIN 1000 MCG: 1000 INJECTION, SOLUTION INTRAMUSCULAR; SUBCUTANEOUS at 08:59

## 2025-06-30 ENCOUNTER — OFFICE VISIT (OUTPATIENT)
Dept: ORTHOPEDIC SURGERY | Facility: CLINIC | Age: 60
End: 2025-06-30
Payer: MEDICAID

## 2025-06-30 VITALS — WEIGHT: 142.4 LBS | HEIGHT: 66 IN | TEMPERATURE: 97.9 F | BODY MASS INDEX: 22.88 KG/M2

## 2025-06-30 DIAGNOSIS — M77.01 MEDIAL EPICONDYLITIS OF RIGHT ELBOW: Primary | ICD-10-CM

## 2025-06-30 PROCEDURE — 99213 OFFICE O/P EST LOW 20 MIN: CPT | Performed by: ORTHOPAEDIC SURGERY

## 2025-06-30 PROCEDURE — 20605 DRAIN/INJ JOINT/BURSA W/O US: CPT | Performed by: ORTHOPAEDIC SURGERY

## 2025-06-30 PROCEDURE — 1160F RVW MEDS BY RX/DR IN RCRD: CPT | Performed by: ORTHOPAEDIC SURGERY

## 2025-06-30 PROCEDURE — 1159F MED LIST DOCD IN RCRD: CPT | Performed by: ORTHOPAEDIC SURGERY

## 2025-06-30 RX ORDER — LIDOCAINE HYDROCHLORIDE 10 MG/ML
2 INJECTION, SOLUTION EPIDURAL; INFILTRATION; INTRACAUDAL; PERINEURAL
Status: COMPLETED | OUTPATIENT
Start: 2025-06-30 | End: 2025-06-30

## 2025-06-30 RX ORDER — METHYLPREDNISOLONE ACETATE 80 MG/ML
80 INJECTION, SUSPENSION INTRA-ARTICULAR; INTRALESIONAL; INTRAMUSCULAR; SOFT TISSUE
Status: COMPLETED | OUTPATIENT
Start: 2025-06-30 | End: 2025-06-30

## 2025-06-30 RX ORDER — OFLOXACIN 3 MG/ML
SOLUTION AURICULAR (OTIC)
COMMUNITY
Start: 2025-06-11

## 2025-06-30 RX ORDER — FLUCONAZOLE 150 MG/1
TABLET ORAL
COMMUNITY
Start: 2025-06-23

## 2025-06-30 RX ADMIN — LIDOCAINE HYDROCHLORIDE 2 ML: 10 INJECTION, SOLUTION EPIDURAL; INFILTRATION; INTRACAUDAL; PERINEURAL at 11:21

## 2025-06-30 RX ADMIN — METHYLPREDNISOLONE ACETATE 80 MG: 80 INJECTION, SUSPENSION INTRA-ARTICULAR; INTRALESIONAL; INTRAMUSCULAR; SOFT TISSUE at 11:21

## 2025-06-30 NOTE — PROGRESS NOTES
Demi Leija     : 1965     MRN: [id number]     DATE: 2025    Chief Complaints: Referral for right elbow pain    History of Present Illness:     59 y.o. female patient who presents for evaluation of the right elbow.  She was referred by Dr. Lira.  She reports a 2-year history of problems with the right elbow.  She thinks that the symptoms were precipitated by lifting [place] containers but she is not 100% sure.  When her symptoms began, she was mostly having pain on the outside of the elbow.  She had 2 injections for that problem and some PT.  The lateral sided elbow pain is now resolved.  More recently she has been experiencing pain on the inside of the elbow.  She was having some numbness and tingling but those symptoms are now resolved.  Her primary complaint at this point is pain which is intermittent and stabbing and typically associated with certain reaching and lifting movements.  She had 1 injection for the medial sided elbow pain which helped but has now worn off.  She has tried brace and some exercises which were not nearly as beneficial.  She was referred to me for further evaluation and treatment.    Allergies:   Allergies   Allergen Reactions    Nylon Unknown - Low Severity     Nylon stitches   will not dissolve causes irritation of skin         Home Medications:    Current Outpatient Medications:     albuterol sulfate  (90 Base) MCG/ACT inhaler, Inhale Every 4 (Four) Hours As Needed for Wheezing., Disp: , Rfl:     Ashwagandha (Ashwagandha 35) 120 MG capsule, Take  by mouth., Disp: , Rfl:     Berberine Chloride (BERBERINE HCI PO), Take  by mouth., Disp: , Rfl:     cyclobenzaprine (FLEXERIL) 10 MG tablet, TAKE ONE TABLET BY MOUTH THREE TIMES A DAY AS NEEDED FOR MUSCLE SPASMS, Disp: 60 tablet, Rfl: 0    fluconazole (DIFLUCAN) 150 MG tablet, , Disp: , Rfl:     IODINE, KELP, PO, Take  by mouth., Disp: , Rfl:     ipratropium-albuterol (DUO-NEB) 0.5-2.5 mg/3 ml nebulizer, Take 3  mL by nebulization Every 4 (Four) Hours As Needed for Wheezing., Disp: 360 mL, Rfl: 1    lisinopril (PRINIVIL,ZESTRIL) 20 MG tablet, Take 1 tablet by mouth Daily., Disp: 30 tablet, Rfl: 0    montelukast (Singulair) 10 MG tablet, Take 1 tablet by mouth Every Night., Disp: 30 tablet, Rfl: 3    multivitamin with minerals tablet tablet, Take 1 tablet by mouth Daily., Disp: , Rfl:     ofloxacin (FLOXIN) 0.3 % otic solution, , Disp: , Rfl:     Progesterone (PROMETRIUM) 200 MG capsule, , Disp: , Rfl:     promethazine-dextromethorphan (PROMETHAZINE-DM) 6.25-15 MG/5ML syrup, Take 5 mL by mouth 4 (Four) Times a Day As Needed for Cough., Disp: 180 mL, Rfl: 0    Unable to find, 1 each 1 (One) Time. Fur and Mask [facility], Disp: , Rfl:     vitamin D (ERGOCALCIFEROL) 1.25 MG (49158 UT) capsule capsule, Take 1 capsule by mouth., Disp: , Rfl:     vitamin E 100 UNIT capsule, Take 4 capsules by mouth Daily., Disp: 30 capsule, Rfl: 11    Current Facility-Administered Medications:     cyanocobalamin injection 1,000 mcg, 1,000 mcg, Intramuscular, Q28 Days, Raven Lanza APRN, 1,000 mcg at 06/25/25 0859  Past Medical History:   Diagnosis Date    Acquired absence of other specified parts of digestive tract     Asthma     Chronic serous otitis media     Colitis     Crohn disease     Diarrhea, unspecified     Gastroesophageal reflux disease 10/11/2023    Hashimoto's disease     Headache     Hypertension     Hypothyroidism     Lumbar herniated disc     Pain in joints of unspecified hand     Post-menopause     Sciatica associated with disorder of lumbar spine     Tattoos      Past Surgical History:   Procedure Laterality Date    ABDOMINOPLASTY  2012    mini    BUNIONECTOMY Bilateral 2010    CHOLECYSTECTOMY  2021    COLONOSCOPY  10/2021    FOOT FRACTURE SURGERY Left     great toe    HEMORRHOIDECTOMY  2010    NOSE SURGERY      RHINOPLASTY  1995    STOMACH SURGERY      TOE SURGERY      TONSILLECTOMY      WISDOM TOOTH EXTRACTION       Social  History     Occupational History    Occupation: REALTOR   Tobacco Use    Smoking status: Never     Passive exposure: Never    Smokeless tobacco: Never   Vaping Use    Vaping status: Never Used   Substance and Sexual Activity    Alcohol use: Yes     Alcohol/week: 6.0 standard drinks of alcohol     Types: 4 Glasses of wine, 2 Drinks containing 0.5 oz of alcohol per week    Drug use: Yes     Types: Marijuana     Comment: 4-5 times a year    Sexual activity: Yes     Partners: Male     Birth control/protection: None, Post-menopausal     Comment: Partner had a vasectomy      Social History     Social History Narrative    Not on file     Family History   Problem Relation Age of Onset    Diabetes Mother     Arthritis Mother     Stroke Mother     Arthritis Father     Cancer Father         colon    Stroke Father     Colon cancer Father     Diabetes Father     Hypertension Sister     Cancer Brother         prostate    Stroke Brother         x 2    Hypertension Brother     Colon polyps Brother     SARA disease Brother     Irritable bowel syndrome Brother         Brothers    Prostate cancer Brother     Hypertension Brother     Stroke Brother     Prostate cancer Brother     Stroke Brother     Hypertension Brother     Stroke Maternal Grandmother     Diabetes Maternal Grandmother     Colon cancer Maternal Grandmother     Stroke Paternal Grandmother     Hypertension Other        Review of Systems:      Constitutional: Denies fever, shaking or chills   Eyes: Denies change in visual acuity   HEENT: Denies nasal congestion or sore throat   Respiratory: Denies cough or shortness of breath   Cardiovascular: Denies chest pain or edema  Endocrine: Denies tremors, palpitations, intolerance of heat or cold, polyuria, polydipsia.  GI: Denies abdominal pain, nausea, vomiting, bloody stools or diarrhea  : Denies frequency, urgency, incontinence, retention, or nocturia.  Musculoskeletal: Denies numbness, tingling or loss of motor function  "except as above  Integument: Denies rash, lesion or ulceration   Neurologic: Denies headache or focal weakness, deficits  Heme: Denies spontaneous or excessive bleeding, epistaxis, hematuria, melena, fatigue, enlarged or tender lymph nodes.      All other pertinent positives and negatives as noted above in HPI.    Physical Exam: 59 y.o. female    Vitals:    06/30/25 1105   Temp: 97.9 °F (36.6 °C)   Weight: 64.6 kg (142 lb 6.4 oz)   Height: 167.6 cm (66\")     General:  Patient is awake and alert.  Appears in no acute distress or discomfort.    Psych:  Affect and demeanor are appropriate.    Extremities:  Right elbow skin appears benign.  No obvious lesions, swellings, masses or adenopathy.  Focal tenderness noted over the medial epicondyle.  No tenderness on the lateral side or the remainder of the elbow.  Full elbow motion.  No instability.  Good strength with elbow flexion, extension, supination, pronation.  Pronation and resisted wrist flexion are moderately uncomfortable.  Positive cheek press test.  Good strength in the hand with  and pinch.  Sensation is intact.  Palpable radial pulse with good skin turgor and brisk capillary refill.    DIAGNOSTIC STUDIES    Xrays: Her previous x-rays and her MRI of the right elbow from last year are both reviewed.  I have independently interpreted the images.  The x-rays show no significant abnormalities.  The MRI findings are listed below.    1. Moderate to severe common extensor tendinopathy with mild  partial-thickness undersurface tear at the lateral epicondyle.  2. Likely chronic partial-thickness sprain of the proximal RCL complex  with mild posterior subluxation of the radial head in relation to the  capitellum suggesting some posterolateral rotatory instability.  3. Mild proximal common flexor tendinopathy.    ASSESSMENT: Right medial epicondylitis    PLAN: At the present time, it seems that the medial epicondylitis is the only symptomatic issue for her.  We " discussed options in detail, both surgical and non-surgical.  I explained the likely short-term benefits of repeat cortisone injection and the associated risks.  We also talked about the available evidence on PRP.  I explained that this data for medial epicondylitis is largely extrapolated from the data on lateral epicondylitis.  The patient acknowledged understanding of this information. She elected to try 1 more cortisone injection today.  If that fails then she says she would like to consider the PRP.  The risk, benefits and alternatives were discussed.  She consented and the procedure was performed as described below.  She will follow-up as needed.    Bob Cheung MD    06/30/2025    CC to Raven Lanza APRN       [facility] Joint Arthrocentesis: R elbow  Date/Time: 6/30/2025 11:21 AM  Consent given by: patient  Site marked: site marked  Timeout: Immediately prior to procedure a time out was called to verify the correct patient, procedure, equipment, support staff and site/side marked as required   Supporting Documentation  Indications: pain and diagnostic evaluation   Procedure Details  Location: elbow - R elbow (Medial Epicondyle)  Preparation: Patient was prepped and draped in the usual sterile fashion  Needle size: 25 G  Approach: Into the area of the common flexor tendon.  Medications administered: 80 mg methylPREDNISolone acetate 80 MG/ML; 2 mL lidocaine PF 1% 1 %  Patient tolerance: patient tolerated the procedure well with no immediate complications

## 2025-07-03 RX ORDER — LISINOPRIL 20 MG/1
20 TABLET ORAL DAILY
Qty: 30 TABLET | Refills: 0 | Status: SHIPPED | OUTPATIENT
Start: 2025-07-03

## 2025-07-14 RX ORDER — FLUCONAZOLE 150 MG/1
150 TABLET ORAL ONCE
Qty: 1 TABLET | Refills: 0 | Status: SHIPPED | OUTPATIENT
Start: 2025-07-14 | End: 2025-07-14

## 2025-07-25 ENCOUNTER — HOSPITAL ENCOUNTER (OUTPATIENT)
Dept: ULTRASOUND IMAGING | Facility: HOSPITAL | Age: 60
Discharge: HOME | End: 2025-07-25
Payer: MEDICAID

## 2025-07-30 ENCOUNTER — CLINICAL SUPPORT (OUTPATIENT)
Dept: FAMILY MEDICINE CLINIC | Facility: CLINIC | Age: 60
End: 2025-07-30
Payer: MEDICAID

## 2025-07-30 PROCEDURE — 96372 THER/PROPH/DIAG INJ SC/IM: CPT | Performed by: NURSE PRACTITIONER

## 2025-07-30 RX ADMIN — CYANOCOBALAMIN 1000 MCG: 1000 INJECTION, SOLUTION INTRAMUSCULAR; SUBCUTANEOUS at 10:12

## 2025-08-08 RX ORDER — HYDROCORTISONE 25 MG/G
CREAM TOPICAL
Qty: 30 G | Refills: 1 | Status: SHIPPED | OUTPATIENT
Start: 2025-08-08

## 2025-08-08 RX ORDER — LISINOPRIL 20 MG/1
20 TABLET ORAL DAILY
Qty: 30 TABLET | Refills: 0 | Status: SHIPPED | OUTPATIENT
Start: 2025-08-08

## 2025-08-22 ENCOUNTER — OFFICE VISIT (OUTPATIENT)
Dept: OBSTETRICS AND GYNECOLOGY | Facility: CLINIC | Age: 60
End: 2025-08-22
Payer: MEDICAID

## 2025-08-22 VITALS
WEIGHT: 141 LBS | DIASTOLIC BLOOD PRESSURE: 77 MMHG | BODY MASS INDEX: 22.66 KG/M2 | SYSTOLIC BLOOD PRESSURE: 108 MMHG | HEIGHT: 66 IN

## 2025-08-22 DIAGNOSIS — Z78.0 POST-MENOPAUSAL: ICD-10-CM

## 2025-08-22 DIAGNOSIS — R10.2 PELVIC PAIN: ICD-10-CM

## 2025-08-22 DIAGNOSIS — Z01.419 ENCOUNTER FOR GYNECOLOGICAL EXAMINATION WITHOUT ABNORMAL FINDING: Primary | ICD-10-CM

## 2025-08-22 DIAGNOSIS — K64.4 EXTERNAL HEMORRHOIDS: ICD-10-CM

## 2025-08-25 ENCOUNTER — CLINICAL SUPPORT (OUTPATIENT)
Dept: FAMILY MEDICINE CLINIC | Facility: CLINIC | Age: 60
End: 2025-08-25
Payer: MEDICAID

## 2025-08-25 PROCEDURE — 96372 THER/PROPH/DIAG INJ SC/IM: CPT | Performed by: NURSE PRACTITIONER

## 2025-08-25 RX ADMIN — CYANOCOBALAMIN 1000 MCG: 1000 INJECTION, SOLUTION INTRAMUSCULAR; SUBCUTANEOUS at 10:08

## 2025-08-26 LAB
CYTOLOGIST CVX/VAG CYTO: NORMAL
CYTOLOGY CVX/VAG DOC CYTO: NORMAL
CYTOLOGY CVX/VAG DOC THIN PREP: NORMAL
DX ICD CODE: NORMAL
HPV I/H RISK 4 DNA CVX QL PROBE+SIG AMP: NEGATIVE
OTHER STN SPEC: NORMAL
SERVICE CMNT-IMP: NORMAL
STAT OF ADQ CVX/VAG CYTO-IMP: NORMAL

## 2025-08-28 ENCOUNTER — ANESTHESIA EVENT (OUTPATIENT)
Dept: GASTROENTEROLOGY | Facility: HOSPITAL | Age: 60
End: 2025-08-28
Payer: MEDICAID

## 2025-08-28 ENCOUNTER — HOSPITAL ENCOUNTER (OUTPATIENT)
Facility: HOSPITAL | Age: 60
Setting detail: HOSPITAL OUTPATIENT SURGERY
Discharge: HOME | End: 2025-08-28
Attending: INTERNAL MEDICINE | Admitting: INTERNAL MEDICINE
Payer: MEDICAID

## 2025-08-28 ENCOUNTER — TELEPHONE (OUTPATIENT)
Dept: GASTROENTEROLOGY | Facility: CLINIC | Age: 60
End: 2025-08-28
Payer: MEDICAID

## 2025-08-28 ENCOUNTER — ANESTHESIA (OUTPATIENT)
Dept: GASTROENTEROLOGY | Facility: HOSPITAL | Age: 60
End: 2025-08-28
Payer: MEDICAID

## 2025-08-28 VITALS
RESPIRATION RATE: 18 BRPM | BODY MASS INDEX: 22.02 KG/M2 | SYSTOLIC BLOOD PRESSURE: 111 MMHG | WEIGHT: 137 LBS | HEIGHT: 66 IN | OXYGEN SATURATION: 99 % | DIASTOLIC BLOOD PRESSURE: 81 MMHG | HEART RATE: 64 BPM

## 2025-08-28 DIAGNOSIS — R19.7 DIARRHEA, UNSPECIFIED TYPE: ICD-10-CM

## 2025-08-28 DIAGNOSIS — K52.831 COLLAGENOUS COLITIS: ICD-10-CM

## 2025-08-28 DIAGNOSIS — R10.30 LOWER ABDOMINAL PAIN: ICD-10-CM

## 2025-08-28 PROCEDURE — 88305 TISSUE EXAM BY PATHOLOGIST: CPT | Performed by: INTERNAL MEDICINE

## 2025-08-28 PROCEDURE — 25010000002 LIDOCAINE 2% SOLUTION: Performed by: NURSE ANESTHETIST, CERTIFIED REGISTERED

## 2025-08-28 PROCEDURE — 25810000003 LACTATED RINGERS PER 1000 ML: Performed by: INTERNAL MEDICINE

## 2025-08-28 PROCEDURE — 25010000002 PROPOFOL 10 MG/ML EMULSION: Performed by: NURSE ANESTHETIST, CERTIFIED REGISTERED

## 2025-08-28 RX ORDER — SODIUM CHLORIDE, SODIUM LACTATE, POTASSIUM CHLORIDE, CALCIUM CHLORIDE 600; 310; 30; 20 MG/100ML; MG/100ML; MG/100ML; MG/100ML
1000 INJECTION, SOLUTION INTRAVENOUS CONTINUOUS
Status: DISCONTINUED | OUTPATIENT
Start: 2025-08-28 | End: 2025-08-28 | Stop reason: HOSPADM

## 2025-08-28 RX ORDER — PROPOFOL 10 MG/ML
VIAL (ML) INTRAVENOUS AS NEEDED
Status: DISCONTINUED | OUTPATIENT
Start: 2025-08-28 | End: 2025-08-28 | Stop reason: SURG

## 2025-08-28 RX ORDER — SODIUM CHLORIDE 0.9 % (FLUSH) 0.9 %
10 SYRINGE (ML) INJECTION AS NEEDED
Status: DISCONTINUED | OUTPATIENT
Start: 2025-08-28 | End: 2025-08-28 | Stop reason: HOSPADM

## 2025-08-28 RX ORDER — LIDOCAINE HYDROCHLORIDE 20 MG/ML
INJECTION, SOLUTION INFILTRATION; PERINEURAL AS NEEDED
Status: DISCONTINUED | OUTPATIENT
Start: 2025-08-28 | End: 2025-08-28 | Stop reason: SURG

## 2025-08-28 RX ADMIN — SODIUM CHLORIDE, POTASSIUM CHLORIDE, SODIUM LACTATE AND CALCIUM CHLORIDE 1000 ML: 600; 310; 30; 20 INJECTION, SOLUTION INTRAVENOUS at 12:06

## 2025-08-28 RX ADMIN — LIDOCAINE HYDROCHLORIDE 60 MG: 20 INJECTION, SOLUTION INFILTRATION; PERINEURAL at 13:21

## 2025-08-28 RX ADMIN — PROPOFOL 150 MG: 10 INJECTION, EMULSION INTRAVENOUS at 13:21

## 2025-08-28 RX ADMIN — PROPOFOL 180 MCG/KG/MIN: 10 INJECTION, EMULSION INTRAVENOUS at 13:26

## 2025-08-29 LAB
CYTO UR: NORMAL
LAB AP CASE REPORT: NORMAL
PATH REPORT.FINAL DX SPEC: NORMAL
PATH REPORT.GROSS SPEC: NORMAL

## (undated) DEVICE — Device

## (undated) DEVICE — BLCK/BITE BLOX W/DENTL/RIM W/STRAP 54F

## (undated) DEVICE — ADAPT CLN BIOGUARD AIR/H2O DISP

## (undated) DEVICE — TUBING, SUCTION, 1/4" X 10', STRAIGHT: Brand: MEDLINE

## (undated) DEVICE — SENSR O2 OXIMAX FNGR A/ 18IN NONSTR

## (undated) DEVICE — KT ORCA ORCAPOD DISP STRL

## (undated) DEVICE — SINGLE-USE BIOPSY FORCEPS: Brand: RADIAL JAW 4

## (undated) DEVICE — LN SMPL CO2 SHTRM SD STREAM W/M LUER